# Patient Record
Sex: FEMALE | Race: ASIAN | Employment: FULL TIME | URBAN - METROPOLITAN AREA
[De-identification: names, ages, dates, MRNs, and addresses within clinical notes are randomized per-mention and may not be internally consistent; named-entity substitution may affect disease eponyms.]

---

## 2017-01-10 ENCOUNTER — MEDICAL CORRESPONDENCE (OUTPATIENT)
Dept: HEALTH INFORMATION MANAGEMENT | Facility: CLINIC | Age: 27
End: 2017-01-10

## 2017-06-07 ENCOUNTER — TRANSFERRED RECORDS (OUTPATIENT)
Dept: HEALTH INFORMATION MANAGEMENT | Facility: CLINIC | Age: 27
End: 2017-06-07

## 2017-06-07 ENCOUNTER — MEDICAL CORRESPONDENCE (OUTPATIENT)
Dept: HEALTH INFORMATION MANAGEMENT | Facility: CLINIC | Age: 27
End: 2017-06-07

## 2017-06-12 ENCOUNTER — TRANSFERRED RECORDS (OUTPATIENT)
Dept: HEALTH INFORMATION MANAGEMENT | Facility: CLINIC | Age: 27
End: 2017-06-12

## 2017-06-22 ENCOUNTER — MEDICAL CORRESPONDENCE (OUTPATIENT)
Dept: HEALTH INFORMATION MANAGEMENT | Facility: CLINIC | Age: 27
End: 2017-06-22

## 2017-06-29 ENCOUNTER — OFFICE VISIT (OUTPATIENT)
Dept: OBGYN | Facility: CLINIC | Age: 27
End: 2017-06-29
Attending: OBSTETRICS & GYNECOLOGY
Payer: COMMERCIAL

## 2017-06-29 VITALS
HEART RATE: 71 BPM | SYSTOLIC BLOOD PRESSURE: 100 MMHG | DIASTOLIC BLOOD PRESSURE: 70 MMHG | HEIGHT: 64 IN | BODY MASS INDEX: 19.48 KG/M2 | WEIGHT: 114.1 LBS

## 2017-06-29 DIAGNOSIS — E03.9 HYPOTHYROIDISM, UNSPECIFIED TYPE: ICD-10-CM

## 2017-06-29 DIAGNOSIS — Z34.01 ENCOUNTER FOR SUPERVISION OF NORMAL FIRST PREGNANCY IN FIRST TRIMESTER: Primary | ICD-10-CM

## 2017-06-29 DIAGNOSIS — Z34.01 NORMAL FIRST PREGNANCY IN FIRST TRIMESTER: Primary | ICD-10-CM

## 2017-06-29 LAB
ABO + RH BLD: NORMAL
ABO + RH BLD: NORMAL
BASOPHILS # BLD AUTO: 0 10E9/L (ref 0–0.2)
BASOPHILS NFR BLD AUTO: 0.1 %
BLD GP AB SCN SERPL QL: NORMAL
BLOOD BANK CMNT PATIENT-IMP: NORMAL
DIFFERENTIAL METHOD BLD: NORMAL
EOSINOPHIL # BLD AUTO: 0.1 10E9/L (ref 0–0.7)
EOSINOPHIL NFR BLD AUTO: 0.8 %
ERYTHROCYTE [DISTWIDTH] IN BLOOD BY AUTOMATED COUNT: 14.2 % (ref 10–15)
HCT VFR BLD AUTO: 38.8 % (ref 35–47)
HGB BLD-MCNC: 13.3 G/DL (ref 11.7–15.7)
IMM GRANULOCYTES # BLD: 0 10E9/L (ref 0–0.4)
IMM GRANULOCYTES NFR BLD: 0.1 %
LYMPHOCYTES # BLD AUTO: 1.5 10E9/L (ref 0.8–5.3)
LYMPHOCYTES NFR BLD AUTO: 21.6 %
MCH RBC QN AUTO: 29.9 PG (ref 26.5–33)
MCHC RBC AUTO-ENTMCNC: 34.3 G/DL (ref 31.5–36.5)
MCV RBC AUTO: 87 FL (ref 78–100)
MONOCYTES # BLD AUTO: 0.6 10E9/L (ref 0–1.3)
MONOCYTES NFR BLD AUTO: 7.9 %
NEUTROPHILS # BLD AUTO: 5 10E9/L (ref 1.6–8.3)
NEUTROPHILS NFR BLD AUTO: 69.5 %
NRBC # BLD AUTO: 0 10*3/UL
NRBC BLD AUTO-RTO: 0 /100
PLATELET # BLD AUTO: 161 10E9/L (ref 150–450)
RBC # BLD AUTO: 4.45 10E12/L (ref 3.8–5.2)
SPECIMEN EXP DATE BLD: NORMAL
WBC # BLD AUTO: 7.1 10E9/L (ref 4–11)

## 2017-06-29 PROCEDURE — 87340 HEPATITIS B SURFACE AG IA: CPT | Performed by: ADVANCED PRACTICE MIDWIFE

## 2017-06-29 PROCEDURE — 87088 URINE BACTERIA CULTURE: CPT | Performed by: ADVANCED PRACTICE MIDWIFE

## 2017-06-29 PROCEDURE — 86850 RBC ANTIBODY SCREEN: CPT | Performed by: ADVANCED PRACTICE MIDWIFE

## 2017-06-29 PROCEDURE — 82306 VITAMIN D 25 HYDROXY: CPT | Performed by: ADVANCED PRACTICE MIDWIFE

## 2017-06-29 PROCEDURE — 87389 HIV-1 AG W/HIV-1&-2 AB AG IA: CPT | Performed by: ADVANCED PRACTICE MIDWIFE

## 2017-06-29 PROCEDURE — 86901 BLOOD TYPING SEROLOGIC RH(D): CPT | Performed by: ADVANCED PRACTICE MIDWIFE

## 2017-06-29 PROCEDURE — 86780 TREPONEMA PALLIDUM: CPT | Performed by: ADVANCED PRACTICE MIDWIFE

## 2017-06-29 PROCEDURE — 86762 RUBELLA ANTIBODY: CPT | Performed by: ADVANCED PRACTICE MIDWIFE

## 2017-06-29 PROCEDURE — 99212 OFFICE O/P EST SF 10 MIN: CPT | Mod: ZF

## 2017-06-29 PROCEDURE — 85025 COMPLETE CBC W/AUTO DIFF WBC: CPT | Performed by: ADVANCED PRACTICE MIDWIFE

## 2017-06-29 PROCEDURE — 86900 BLOOD TYPING SEROLOGIC ABO: CPT | Performed by: ADVANCED PRACTICE MIDWIFE

## 2017-06-29 PROCEDURE — 76817 TRANSVAGINAL US OBSTETRIC: CPT | Mod: ZF

## 2017-06-29 PROCEDURE — 87086 URINE CULTURE/COLONY COUNT: CPT | Performed by: ADVANCED PRACTICE MIDWIFE

## 2017-06-29 PROCEDURE — 36415 COLL VENOUS BLD VENIPUNCTURE: CPT | Performed by: ADVANCED PRACTICE MIDWIFE

## 2017-06-29 RX ORDER — PRENATAL VIT/IRON FUM/FOLIC AC 27MG-0.8MG
1 TABLET ORAL DAILY
COMMUNITY
End: 2018-09-14

## 2017-06-29 RX ORDER — LEVOTHYROXINE SODIUM 50 UG/1
50 TABLET ORAL DAILY
COMMUNITY
End: 2017-10-04

## 2017-06-29 NOTE — LETTER
"2017       RE: Evelia Jansen  [address]  [address]  [facility] [address]     Dear Colleague,    Thank you for referring your patient, Evelia Jansen, to the [facility] at [facility]. Please see a copy of my visit note below.      Subjective   26 year old female who presents to clinic for initiation of OB care.   at 9w3d with estimated date of delivery of 2018 based on LMP, c/w ultrasound.    Pregnancy is planned but happened \"very fast!\" States they were using condoms then stopped and only had to try for 1 month before becoming pregnant.    Feeling well overall.  Notes very small amount of spotting yesterday. Has not noticed any more.  Reports mild nausea and decreased appetite- Had some vomiting this week- sometimes in evening. Has been using aashish with relief.    States she was recently diagnosed with hypothyroidism and was started on Levothyroxine 50mcg. Sarted taking . Discussed thyroid labs at next appt so is at least 4 weeks s/p starting medication.     x 1 year in July,  \"Paige Martinez.\"   Has been in [place] for 1 year- moved from [place].   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 On year .      States that she understands English well and if her  is present, she doesn't need . Will request an  if desired or if at appt alone.      PERSONAL/SOCIAL HISTORY  Employment:not working  Additional items: Denies past or present domestic violence, sexual and psychological abuse.    Objective  -VS: reviewed and within normal limits   -General appearance: no acute distress, patient is comfortable   NEUROLOGICAL/PSYCHIATRIC   - Oriented x3,   -Mood and affect: : normal   Genetic/Infection questionnaire completed, risks include none    Has not had a pap ever that she remembers.    Assessment/Plan   at 9w3d  by Patient's last menstrual period was 2017 (approximate). and US " confirms  Encounter Diagnoses   Name Primary?     Normal first pregnancy in first trimester Yes     Hypothyroidism, unspecified type      Orders Placed This Encounter   Procedures     25- OH-Vitamin D     CBC with Platelets Differential [DXD343]     Anti Treponema [DVJ0680]     HIV Antigen Antibody Combo [IRY8276]     Hepatitis B Surface Antigen [INQ167]     Rubella Antibody IgG Quantitative [JZE9693]     ABO/Rh Type and Screen [KUC620]     Orders Placed This Encounter   Medications     levothyroxine (SYNTHROID/LEVOTHROID) 50 MCG tablet     Sig: Take 50 mcg by mouth daily     Prenatal Vit-Fe Fumarate-FA (PRENATAL MULTIVITAMIN  PLUS IRON) 27-0.8 MG TABS per tablet     Sig: Take 1 tablet by mouth daily     - Oriented to Practice, types of care, and how to reach a provider.   - Patient received 1st trimester new OB education packet complete with aide of The Expectant Family booklet including information on genetic screening test options.    - Patient Undecided on 1st trimester screening, will need MFM referral for level 2 u/s or both.  - Patient was encouraged to continue prenatal vitamins as tolerated.    - Patient instructed to schedule new OB exam with provider in 1-2 weeks.    - Pregnancy concerns to be addressed by provider at new OB exam include: Needs thyroid labs, pap, gc/ct at NOB, Undecided about 1st trimester, needs mfm orders for level 2, Need to discuss CNM or MD care    - OBI education reviewed.  - OBI labs ordered.   - Needs thyroid labs at NOB (so will be at least 4 weeks for starting meds).  - Needs pap and gc/ct at NO[facility].  - Undecided on 1st trimester screening, will need MFM referral for level 2 u/s or both.  - Need to discuss CNM or MD care  - Continue scheduled prenatal care, RT weeks and prn if questions or concerns    KELSEY Mahajan, PADMINI

## 2017-06-29 NOTE — PATIENT INSTRUCTIONS
"  Thank you for choosing [facility] for your obstetrical care.  We are an integrated health clinic with obstetricians, midwives, a psychologist, an acupuncturist, a nutritionist, a pharmacist, internal medicine and family practice all in one.  If you have questions about services offered please ask.      o Please keep all appointments with your provider.  You will help catch, prevent and treat problems early.  o Review your Expectant Family booklet and folder given to you at this intake visit.  It can answer many basic questions including:    Treatment for nausea and vomiting    Medications that are safe in pregnancy    Healthy diet and weight gain    Exercise and activity  o ASK questions!!  Please use \"Questions for my New OB visit\" form to write down any questions or concerns for your next visit.  o Eat a healthy diet.  Visit www.choosemyplate.gov and click on  Pregnancy and Breastfeeding  for information and tips  o Do not smoke.  Avoid other people's smoke, too.  We are happy to help with referrals to stop smoking programs.  o Do not drink alcohol.  o Try to avoid people who have colds or other infections.  Practice good hand washing.  o Consider registering for our Healthy Pregnancy Class here at [facility].  This class is offered every 3rd Wednesday from 2:30-4:30 p.m.  [place] at [phone] or online at [email or web address] or [email or web address]  o Consider registering for prenatal education classes through [place] at [facility].  You can view class schedules and register online at [email or web address] or call [phone] for questions     For urgent concerns, call [facility] at [phone] to speak with a triage nurse during regular clinic hours (8:00 am - 4:30 pm).  This line is answered by our service 24 hours a day, after hours a provider will return your call.  "

## 2017-06-29 NOTE — NURSING NOTE
Chief Complaint   Patient presents with     Prenatal Care     OB intake visit. JOHNATHAN 1/29/18 9 weeks and 3 days       Donna Romeo, CMA 6/29/2017

## 2017-06-29 NOTE — PROGRESS NOTES
"  Subjective   26 year old female who presents to clinic for initiation of OB care.   at 9w3d with estimated date of delivery of 2018 based on LMP, c/w ultrasound.    Pregnancy is planned but happened \"very fast!\" States they were using condoms then stopped and only had to try for 1 month before becoming pregnant.    Feeling well overall.  Notes very small amount of spotting yesterday. Has not noticed any more.  Reports mild nausea and decreased appetite- Had some vomiting this week- sometimes in evening. Has been using aashish with relief.    States she was recently diagnosed with hypothyroidism and was started on Levothyroxine 50mcg. Sarted taking . Discussed thyroid labs at next appt so is at least 4 weeks s/p starting medication.     x 1 year in July,  \"Paige Martinez.\"   Has been in [place] for 1 year- moved from [place].   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 On year .      States that she understands English well and if her  is present, she doesn't need . Will request an  if desired or if at appt alone.      PERSONAL/SOCIAL HISTORY  Employment:not working  Additional items: Denies past or present domestic violence, sexual and psychological abuse.    Objective  -VS: reviewed and within normal limits   -General appearance: no acute distress, patient is comfortable   NEUROLOGICAL/PSYCHIATRIC   - Oriented x3,   -Mood and affect: : normal   Genetic/Infection questionnaire completed, risks include none    Has not had a pap ever that she remembers.    Assessment/Plan   at 9w3d  by Patient's last menstrual period was 2017 (approximate). and US confirms  Encounter Diagnoses   Name Primary?     Normal first pregnancy in first trimester Yes     Hypothyroidism, unspecified type      Orders Placed This Encounter   Procedures     25- OH-Vitamin D     CBC with Platelets Differential [AFR578]     Anti Treponema [GSG9261]     HIV " Antigen Antibody Combo [IVI0730]     Hepatitis B Surface Antigen [OYC476]     Rubella Antibody IgG Quantitative [RDZ9489]     ABO/Rh Type and Screen [TWO178]     Orders Placed This Encounter   Medications     levothyroxine (SYNTHROID/LEVOTHROID) 50 MCG tablet     Sig: Take 50 mcg by mouth daily     Prenatal Vit-Fe Fumarate-FA (PRENATAL MULTIVITAMIN  PLUS IRON) 27-0.8 MG TABS per tablet     Sig: Take 1 tablet by mouth daily     - Oriented to Practice, types of care, and how to reach a provider.   - Patient received 1st trimester new OB education packet complete with aide of The Expectant Family booklet including information on genetic screening test options.    - Patient Undecided on 1st trimester screening, will need MFM referral for level 2 u/s or both.  - Patient was encouraged to continue prenatal vitamins as tolerated.    - Patient instructed to schedule new OB exam with provider in 1-2 weeks.    - Pregnancy concerns to be addressed by provider at new OB exam include: Needs thyroid labs, pap, gc/ct at NO[facility], Undecided about 1st trimester, needs mfm orders for level 2, Need to discuss CNM or MD care    - OBI education reviewed.  - OBI labs ordered.   - Needs thyroid labs at NOB (so will be at least 4 weeks for starting meds).  - Needs pap and gc/ct at NOB.  - Undecided on 1st trimester screening, will need MFM referral for level 2 u/s or both.  - Need to discuss CNM or MD care  - Continue scheduled prenatal care, RT weeks and prn if questions or concerns    KELSEY Mahajan, PADMINI

## 2017-06-29 NOTE — PROGRESS NOTES
26 year old female,  with LMP 17,  9 3/7weeks Estimated Date of Delivery: 18 presents for confirmation of dates and assessment of viability. This study was done transvaginally.    Measurements     CRL = 29.6 mm = 9 6/7 weeks  EGA.   JOHNATHAN = 18.     Fetal anatomy appears normal for gestational age.     Fetal/Fetal Cardiac Activity: Present.  FHR = 176bpm.     Implantation: Intrauterine.     Cervix = 3.7 cm      Maternal structures appear normal.    Impression: viable IUP at 9+3 weeks by LMP c/w today's study.  Use LMP dating if sure and cycles are regular.    Recommend comprehensive scan at 18 to 20 weeks.    DEREJE Cortes MD, FACOG

## 2017-06-29 NOTE — LETTER
2017       RE: Evelia Jansen  [address]  [address]  [facility] [address]     Dear Colleague,    Thank you for referring your patient, Evelia Jansen, to the [facility] at [facility]. Please see a copy of my visit note below.    26 year old female,  with LMP 17,  9 3/7weeks Estimated Date of Delivery: 18 presents for confirmation of dates and assessment of viability. This study was done transvaginally.    Measurements     CRL = 29.6 mm = 9 6/7 weeks  EGA.   JOHNATHAN = 18.     Fetal anatomy appears normal for gestational age.     Fetal/Fetal Cardiac Activity: Present.  FHR = 176bpm.     Implantation: Intrauterine.     Cervix = 3.7 cm      Maternal structures appear normal.    Impression: viable IUP at 9+3 weeks by LMP c/w today's study.  Use LMP dating if sure and cycles are regular.    Recommend comprehensive scan at 18 to 20 weeks.    DEREJE Cortes MD, FACOG

## 2017-06-29 NOTE — MR AVS SNAPSHOT
"              After Visit Summary   6/29/2017    Evelia Jansen    MRN: [id number]           Patient Information     Date Of Birth          1990        Visit Information        Provider Department      6/29/2017 3:30 PM Gabe Medina CNM [facility]        Today's Diagnoses     Normal first pregnancy in first trimester    -  1    Hypothyroidism, unspecified type          Care Instructions      Thank you for choosing [facility] ([facility]) for your obstetrical care.  We are an integrated health clinic with obstetricians, midwives, a psychologist, an acupuncturist, a nutritionist, a pharmacist, internal medicine and family practice all in one.  If you have questions about services offered please ask.      o Please keep all appointments with your provider.  You will help catch, prevent and treat problems early.  o Review your Expectant Family booklet and folder given to you at this intake visit.  It can answer many basic questions including:    Treatment for nausea and vomiting    Medications that are safe in pregnancy    Healthy diet and weight gain    Exercise and activity  o ASK questions!!  Please use \"Questions for my New OB visit\" form to write down any questions or concerns for your next visit.  o Eat a healthy diet.  Visit www.choosemyplate.gov and click on  Pregnancy and Breastfeeding  for information and tips  o Do not smoke.  Avoid other people's smoke, too.  We are happy to help with referrals to stop smoking programs.  o Do not drink alcohol.  o Try to avoid people who have colds or other infections.  Practice good hand washing.  o Consider registering for our Healthy Pregnancy Class here at [facility].  This class is offered every 3rd Wednesday from 2:30-4:30 p.m.  [place] at [phone] or online at [email or web address] or [email or web address]  o Consider registering for prenatal education classes through [place] at [facility] " [place].  You can view class schedules and register online at [email or web address] or call [phone] for questions     For urgent concerns, call [facility] at [phone] to speak with a triage nurse during regular clinic hours (8:00 am - 4:30 pm).  This line is answered by our service 24 hours a day, after hours a provider will return your call.          Follow-ups after your visit        Follow-up notes from your care team     Return in 2 weeks (on 2017) for NOB.      Who to contact     Please call your clinic at [phone] to:    Ask questions about your health    Make or cancel appointments    Discuss your medicines    Learn about your test results    Speak to your doctor   If you have compliments or concerns about an experience at your clinic, or if you wish to file a complaint, please contact [facility] Patient Relations at [phone] or email us at [email or web address]         Additional Information About Your Visit        "[employer]harYFind Technologies Information     Zaplee is an electronic gateway that provides easy, online access to your medical records. With Zaplee, you can request a clinic appointment, read your test results, renew a prescription or communicate with your care team.     To sign up for Zaplee visit the website at [email or web address]   You will be asked to enter the access code listed below, as well as some personal information. Please follow the directions to create your username and password.     Your access code is: -[email or web address]  Expires: 2017  6:30 AM     Your access code will  in 90 days. If you need help or a new code, please contact your [facility] or call [phone] for assistance.        Care EveryWhere ID     This is your Care EveryWhere ID. This could be used by other organizations to access your [facility] medical records  [id number]        Your Vitals Were     Pulse Height Last Period  "BMI (Body Mass Index)          71 1.615 m (5' 3.58\") 04/24/2017 (Approximate) 19.84 kg/m2         Blood Pressure from Last 3 Encounters:   06/29/17 100/70    Weight from Last 3 Encounters:   06/29/17 51.8 kg (114 lb 1.6 oz)              We Performed the Following     25- OH-Vitamin D     ABO/Rh Type and Screen [YXE130]     Anti Treponema [CLO1813]     CBC with Platelets Differential [ANJ691]     Hepatitis B Surface Antigen [JBJ637]     HIV Antigen Antibody Combo [JGM5741]     Rubella Antibody IgG Quantitative [UUC7530]     Urine Culture Aerobic Bacterial [OZH107]        Primary Care Provider    None Specified       No primary provider on file.        Equal Access to Services     MICHELLE MARTÍNEZ : Sylvia Salamanca, titus vicente, nelly salas, alistair suazo . So [facility] [phone].    ATENCIÓN: Si habla español, tiene a tillman disposición servicios gratuitos de asistencia lingüística. Llame al [phone].    We comply with applicable federal civil rights laws and [place] laws. We do not discriminate on the basis of race, color, national origin, age, disability sex, sexual orientation or gender identity.            Thank you!     Thank you for choosing [facility]  for your care. Our goal is always to provide you with excellent care. Hearing back from our patients is one way we can continue to improve our services. Please take a few minutes to complete the written survey that you may receive in the mail after your visit with us. Thank you!             Your Updated Medication List - Protect others around you: Learn how to safely use, store and throw away your medicines at www.disposemymeds.org.          This list is accurate as of: 6/29/17  4:37 PM.  Always use your most recent med list.                   Brand Name Dispense Instructions for use Diagnosis    levothyroxine 50 MCG tablet    SYNTHROID/LEVOTHROID     Take 50 mcg by mouth daily        " prenatal multivitamin  plus iron 27-0.8 MG Tabs per tablet      Take 1 tablet by mouth daily

## 2017-06-29 NOTE — MR AVS SNAPSHOT
After Visit Summary   2017    Evelia Jansen    MRN: [id number]           Patient Information     Date Of Birth          1990        Visit Information        Provider Department      2017 3:00 PM [facility] ULTRASOUND II [facility]        Today's Diagnoses     Encounter for supervision of normal first pregnancy in first trimester    -  1       Follow-ups after your visit        Who to contact     Please call your clinic at [phone] to:    Ask questions about your health    Make or cancel appointments    Discuss your medicines    Learn about your test results    Speak to your doctor   If you have compliments or concerns about an experience at your clinic, or if you wish to file a complaint, please contact [facility] Patient Relations at [phone] or email us at [email or web address]         Additional Information About Your Visit        MyChart Information     Attender is an electronic gateway that provides easy, online access to your medical records. With Attender, you can request a clinic appointment, read your test results, renew a prescription or communicate with your care team.     To sign up for PitchEnginet visit the website at [email or web address]   You will be asked to enter the access code listed below, as well as some personal information. Please follow the directions to create your username and password.     Your access code is: -[email or web address]  Expires: 2017  6:30 AM     Your access code will  in 90 days. If you need help or a new code, please contact your [facility] Clinic or call [phone] for assistance.        Care EveryWhere ID     This is your Care EveryWhere ID. This could be used by other organizations to access your [facility] medical records  [id number]        Your Vitals Were     Last Period                   2017 (Approximate)            Blood Pressure from Last 3  Encounters:   06/29/17 100/70    Weight from Last 3 Encounters:   06/29/17 51.8 kg (114 lb 1.6 oz)               Primary Care Provider    None Specified       No primary provider on file.        Equal Access to Services     MICHELLE MARTÍNEZ : Hadii aad ku hadalbert Salamanca, watrangda lucecilia, sanazta karodrigoda maritza, alistair carvajal ivangume ramirez laRenatoalbert winter. So [facility] [phone].    ATENCIÓN: Si habla español, tiene a tillman disposición servicios gratuitos de asistencia lingüística. Llame al [phone].    We comply with applicable federal civil rights laws and [place] laws. We do not discriminate on the basis of race, color, national origin, age, disability sex, sexual orientation or gender identity.            Thank you!     Thank you for choosing [facility]  for your care. Our goal is always to provide you with excellent care. Hearing back from our patients is one way we can continue to improve our services. Please take a few minutes to complete the written survey that you may receive in the mail after your visit with us. Thank you!             Your Updated Medication List - Protect others around you: Learn how to safely use, store and throw away your medicines at www.disposemymeds.org.      Notice  As of 6/29/2017  3:56 PM    You have not been prescribed any medications.

## 2017-06-30 LAB
DEPRECATED CALCIDIOL+CALCIFEROL SERPL-MC: 32 UG/L (ref 20–75)
HBV SURFACE AG SERPL QL IA: NONREACTIVE
HIV 1+2 AB+HIV1 P24 AG SERPL QL IA: NORMAL
RUBV IGG SERPL IA-ACNC: 116 IU/ML
T PALLIDUM IGG+IGM SER QL: NEGATIVE

## 2017-07-01 LAB
BACTERIA SPEC CULT: ABNORMAL
Lab: ABNORMAL
MICRO REPORT STATUS: ABNORMAL
SPECIMEN SOURCE: ABNORMAL

## 2017-07-03 PROBLEM — R82.71 GBS BACTERIURIA: Status: ACTIVE | Noted: 2017-07-03

## 2017-07-07 ASSESSMENT — ENCOUNTER SYMPTOMS
POLYDIPSIA: 0
DECREASED APPETITE: 1
WEIGHT GAIN: 0
HALLUCINATIONS: 0
FEVER: 0
WEIGHT LOSS: 0
ALTERED TEMPERATURE REGULATION: 0
CHILLS: 0
NIGHT SWEATS: 0
POLYPHAGIA: 0
FATIGUE: 0
INCREASED ENERGY: 0

## 2017-07-07 ASSESSMENT — ANXIETY QUESTIONNAIRES
7. FEELING AFRAID AS IF SOMETHING AWFUL MIGHT HAPPEN: NOT AT ALL
7. FEELING AFRAID AS IF SOMETHING AWFUL MIGHT HAPPEN: NOT AT ALL
3. WORRYING TOO MUCH ABOUT DIFFERENT THINGS: NOT AT ALL
1. FEELING NERVOUS, ANXIOUS, OR ON EDGE: SEVERAL DAYS
5. BEING SO RESTLESS THAT IT IS HARD TO SIT STILL: NOT AT ALL
6. BECOMING EASILY ANNOYED OR IRRITABLE: NOT AT ALL
GAD7 TOTAL SCORE: 1
4. TROUBLE RELAXING: NOT AT ALL
GAD7 TOTAL SCORE: 1
2. NOT BEING ABLE TO STOP OR CONTROL WORRYING: NOT AT ALL
GAD7 TOTAL SCORE: 1

## 2017-07-08 ASSESSMENT — ANXIETY QUESTIONNAIRES: GAD7 TOTAL SCORE: 1

## 2017-07-12 ENCOUNTER — OFFICE VISIT (OUTPATIENT)
Dept: OBGYN | Facility: CLINIC | Age: 27
End: 2017-07-12
Attending: ADVANCED PRACTICE MIDWIFE
Payer: COMMERCIAL

## 2017-07-12 VITALS
WEIGHT: 114.4 LBS | HEART RATE: 82 BPM | BODY MASS INDEX: 19.9 KG/M2 | DIASTOLIC BLOOD PRESSURE: 64 MMHG | SYSTOLIC BLOOD PRESSURE: 102 MMHG

## 2017-07-12 DIAGNOSIS — E03.9 HYPOTHYROIDISM, UNSPECIFIED TYPE: ICD-10-CM

## 2017-07-12 DIAGNOSIS — Z34.01 NORMAL FIRST PREGNANCY IN FIRST TRIMESTER: Primary | ICD-10-CM

## 2017-07-12 DIAGNOSIS — R82.71 GBS BACTERIURIA: ICD-10-CM

## 2017-07-12 LAB — TSH SERPL DL<=0.005 MIU/L-ACNC: 1.97 MU/L (ref 0.4–4)

## 2017-07-12 PROCEDURE — 87591 N.GONORRHOEAE DNA AMP PROB: CPT | Performed by: ADVANCED PRACTICE MIDWIFE

## 2017-07-12 PROCEDURE — G0145 SCR C/V CYTO,THINLAYER,RESCR: HCPCS | Performed by: ADVANCED PRACTICE MIDWIFE

## 2017-07-12 PROCEDURE — 36415 COLL VENOUS BLD VENIPUNCTURE: CPT | Performed by: ADVANCED PRACTICE MIDWIFE

## 2017-07-12 PROCEDURE — 84443 ASSAY THYROID STIM HORMONE: CPT | Performed by: ADVANCED PRACTICE MIDWIFE

## 2017-07-12 PROCEDURE — 87491 CHLMYD TRACH DNA AMP PROBE: CPT | Performed by: ADVANCED PRACTICE MIDWIFE

## 2017-07-12 PROCEDURE — 99212 OFFICE O/P EST SF 10 MIN: CPT | Mod: 25,ZF

## 2017-07-12 NOTE — LETTER
2017       RE: Evelia Jansen  [address]  [address]  [facility] [address]     Dear Colleague,    Thank you for referring your patient, Evelia Jansen, to the [facility] at [facility]. Please see a copy of my visit note below.      SUBJECTIVE:    26 year old, female, , 11w2d,  who presents to the clinic today for a new ob visit. Feels well overall. Has started PNV. Discussed that pt's family is back in [place] but her mother is applying for visa to come to be with Altamirano in labor.   Estimated Date of Delivery: 2018     OTHER CONCERNS: Decreased appetite, mild nausea occasionally. Still using aashish, which helps. Otherwise feeling well.      ===========================================  ROS   10-point ROS negative save for items described above.   PSYCHIATRIC:  Denies mood changes, difficulty concentrating, depression, anxiety, panic attacks or post partum depression    Social History   Substance Use Topics     Smoking status: Never Smoker     Smokeless tobacco: Never Used     Alcohol use No     History   Drug Use No     History   Smoking Status     Never Smoker   Smokeless Tobacco     Never Used       Alcohol use No     No family history on file.  ============================================  MEDICAL HISTORY   No Known Allergies      Current Outpatient Prescriptions:      levothyroxine (SYNTHROID/LEVOTHROID) 50 MCG tablet, Take 50 mcg by mouth daily, Disp: , Rfl:      Prenatal Vit-Fe Fumarate-FA (PRENATAL MULTIVITAMIN  PLUS IRON) 27-0.8 MG TABS per tablet, Take 1 tablet by mouth daily, Disp: , Rfl:     Past Medical History:   Diagnosis Date     Hypothyroidism 2017    50mcg levothyroxine       No past surgical history on file.         Obstetric History       T0      L0     SAB0   TAB0   Ectopic0   Multiple0   Live Births0       # Outcome Date GA Lbr Nicho/2nd Weight Sex Delivery Anes PTL Lv   1 Current                   GYN History- No Abnormal  Pap Smears                        Cervical procedures: None                        History of STI: None    I personally reviewed the past social/family/medical and surgical history on the date of service.   I reviewed lab work done at Intake visit with patient.    OBJECTIVE:   PHYSICAL EXAM:  /64 (BP Location: Left arm, Patient Position: Chair)  Pulse 82  Wt 51.9 kg (114 lb 6.4 oz)  LMP 04/24/2017 (Approximate)  Breastfeeding? No  BMI 19.9 kg/m2  BMI- Body mass index is 19.9 kg/(m^2)., GENERAL:  Pleasant pregnant female, alert, cooperative and well groomed.  SKIN:  Warm and dry, without lesions or rashes  HEAD: Symmetrical features.  MOUTH:  Buccal mucosa pink, moist without lesions.  Teeth in good repair.    NECK:  Thyroid without enlargement and nodules.  Lymph nodes not palpable.  LUNGS:  Clear to auscultation.  BREAST:    No dominant, fixed or suspicious masses are noted.  No skin or nipple changes or axillary nodes.   Nipples everted.    HEART:  RRR without murmur.  ABDOMEN: Soft without masses , tenderness or organomegaly.  No CVA tenderness.  Uterus palpable at size equal to dates.  No scars noted.. Fetal heart tones present.  MUSCULOSKELETAL:  Full range of motion  EXTREMITIES:  No edema. No significant varicosities.   PELVIC EXAM:  GENITALIA: EGBUS  External genitalia, Bartholin's glands, urethra & Fithian's glands:normal. Vulva reveals no erythema or lesions.        VAGINA:  pink, normal rugae and discharge, no lesions, good tone.   CERVIX:  smooth, without discharge or CMT.                UTERUS: Anteverted,  nontender 12 week size.   ADNEXA:  Without masses or tenderness.   RECTAL:  Normal appearance.  Digital exam deferred.  WET PREP:Not done  GC/CHLAMYDIA CULTURE OBTAINED:YES on pap    ASSESSMENT:  Intrauterine pregnancy 11w2d size consistent with dates  Genetic Screening: First Trimester Screen    Encounter Diagnoses   Name Primary?     Normal first pregnancy in first trimester Yes      Hypothyroidism, unspecified type         PLAN:  Orders Placed This Encounter   Procedures     Obtaining, preparing and conveyance of cervical or vaginal smear to laboratory.     TSH with free T4 reflex     Pap imaged thin layer screen reflex to HPV if ASCUS - recommended age 25 - 29 years     Maternal Fetal Medicine Center Referral [9046.022]     - Reviewed use of triage nurse line and contacting the on-call provider after hours for an urgent need such as fever, vagina bleeding, bladder or vaginal infection, rupture of membranes,  or term labor.    - Reviewed best evidence for: weight gain for her weight and height for pregnancy:  RECOMMENDED WEIGHT GAIN: 25-35 lbs.   healthy diet and foods to avoid; encouraged 5 protein snacks per day; exercise and activity during pregnancy;avoiding exposure to toxoplasmosis; and maintenance of a generally healthy lifestyle.   - Discussed the harms, benefits, side effects and alternative therapies for current prescribed and OTC medications.  - All pt's and FOB's  questions discussed and answered.  Pt verbalized understanding of and agreement to plan of care.   - Reviewed CNM versus MD care - patient would like to decide after first trimester screening results.   - Reviewed implications of GBS bacteruria. Pt agrees to IV antibiotics in labor.   - Reviewed r/b/a to first trimester screen and level II ultrasound - pt desires both. Advised to go to [facility] after visit to schedule first trimester screen.   - Continue scheduled prenatal care and prn if questions or concerns. RTC 6 weeks.     KELSEY Li CNM               Answers for HPI/ROS submitted by the patient on 2017   ALEX 7 TOTAL SCORE: 1  PHQ-2 Score: 0      Again, thank you for allowing me to participate in the care of your patient.      Sincerely,    KELSEY Li CNM

## 2017-07-12 NOTE — MR AVS SNAPSHOT
"              After Visit Summary   7/12/2017    Evelia Jansen    MRN: [id number]           Patient Information     Date Of Birth          1990        Visit Information        Provider Department      7/12/2017 11:15 AM Davina Zhao APRN CNM [facility]        Today's Diagnoses     Normal first pregnancy in first trimester    -  1    Hypothyroidism, unspecified type        GBS bacteriuria          Care Instructions      Thank you for choosing [facility] ([facility]) for your obstetrical care.  We are an integrated health clinic with obstetricians, midwives, a psychologist, an acupuncturist, a nutritionist, a pharmacist, internal medicine and family practice all in one.  If you have questions about services offered please ask.      o Please keep all appointments with your provider.  You will help catch, prevent and treat problems early.  o Review your Expectant Family booklet and folder given to you at this intake visit.  It can answer many basic questions including:    Treatment for nausea and vomiting    Medications that are safe in pregnancy    Healthy diet and weight gain    Exercise and activity  o ASK questions!!  Please use \"Questions for my New OB visit\" form to write down any questions or concerns for your next visit.  o Eat a healthy diet.  Visit www.choosemyplate.gov and click on  Pregnancy and Breastfeeding  for information and tips  o Do not smoke.  Avoid other people's smoke, too.  We are happy to help with referrals to stop smoking programs.  o Do not drink alcohol.  o Try to avoid people who have colds or other infections.  Practice good hand washing.  o Consider registering for our Healthy Pregnancy Class here at [facility].  This class is offered every 3rd Wednesday from 2:30-4:30 p.m.  [place] at [phone] or online at [email or web address] or [email or web address]  o Consider registering for prenatal education classes through [employer] [facility] [place] " "[facility].  You can view class schedules and register online at [email or web address] or call [phone] for questions     For urgent concerns, call [facility] at [phone] to speak with a triage nurse during regular clinic hours (8:00 am - 4:30 pm).  This line is answered by our service 24 hours a day, after hours a provider will return your call.  Thank you for choosing [facility] ([facility] for your obstetrical care.  We are an integrated health clinic with obstetricians, midwives, a psychologist, an acupuncturist, a nutritionist, a pharmacist, internal medicine and family practice all in one.  If you have questions about services offered please ask.      o Please keep all appointments with your provider.  You will help catch, prevent and treat problems early.  o Review your Expectant Family booklet and folder given to you at this intake visit.  It can answer many basic questions including:    Treatment for nausea and vomiting    Medications that are safe in pregnancy    Healthy diet and weight gain    Exercise and activity  o ASK questions!!  Please use \"Questions for my New OB visit\" form to write down any questions or concerns for your next visit.  o Eat a healthy diet.  Visit www.choosemyplate.gov and click on  Pregnancy and Breastfeeding  for information and tips  o Do not smoke.  Avoid other people's smoke, too.  We are happy to help with referrals to stop smoking programs.  o Do not drink alcohol.  o Try to avoid people who have colds or other infections.  Practice good hand washing.  o Consider registering for our Healthy Pregnancy Class here at [facility].  This class is offered every 3rd Wednesday from 2:30-4:30 p.m.  [place] at [phone] or online at [email or web address] or [email or web address]  o Consider registering for prenatal education classes through [place] at [facility].  You can view class schedules and register online at " [email or web address] or call [phone] for questions     For urgent concerns, call [facility] at [phone] to speak with a triage nurse during regular clinic hours (8:00 am - 4:30 pm).  This line is answered by our service 24 hours a day, after hours a provider will return your call.          Follow-ups after your visit        Additional Services     Maternal Fetal Medicine Center Referral [9046.022]       >> Patient may proceed with recommendations for further testing as directed by the Maternal Fetal Medicine Specialist >>    >> If requesting Fetal Echo: MFM will determine appropriate location for exam due to indication.    >> If requesting Lung Maturity Amnio:  If results indicate fetal lung maturity, induction or C/S is recommended within 36 hours.  Please schedule accordingly.     Dear Patient:   Please be aware that coverage of these services is subject to the terms and limitations of your health insurance plan.  Call member services at your health plan with any benefit or coverage questions.      Please bring the following to your appointment:    >>  Any x-rays, CTs or MRIs which have been performed.  Contact the facility where they were done to arrange for  prior to your scheduled appointment.  Any new CT, MRI or other procedures ordered by your specialist must be performed at a [facility] facility or coordinated by your clinic's referral office.  >>  List of current medications   >>  This referral request   >>  Any documents/labs given to you for this referral                  Follow-up notes from your care team     Return in about 6 weeks (around 8/23/2017) for next OB visit, Return OB.      Your next 10 appointments already scheduled     Jul 25, 2017 12:45 PM CDT   Genetic Counseling with UR GEN COUNSELOR 2   [facility] Maternal Fetal Medicine [facility] ([facility])    [address]  [facility] [address]   [phone]            Jul 25, 2017   1:30 PM CDT   MFM NUCHAL TRANSLUCENCY with [id number]   [facility] [place] ([facility])    [address]  [facility] [address] [phone]            Jul 25, 2017  2:00 PM CDT   Radiology MD with JAMARI HURST MD   [facility] [place] ([facility])    [address]  [facility] [address]   [phone]           Please arrive at the time given for your first appointment.  This visit is used internally to schedule the physician's time during your ultrasound.            Aug 22, 2017  8:45 AM CDT   RETURN OB with KELSEY Hannah CNM   [facility] ([facility])    [place] [facility] [address]  [address]  [facility] [id number] [phone]              Future tests that were ordered for you today     Open Future Orders        Priority Expected Expires Ordered    MFM Genetic Counseling Routine  7/12/2018 7/12/2017    MFM Nuchal Transluc w US Single Routine  5/12/2018 7/12/2017            Who to contact     Please call your clinic at [phone] to:    Ask questions about your health    Make or cancel appointments    Discuss your medicines    Learn about your test results    Speak to your doctor   If you have compliments or concerns about an experience at your clinic, or if you wish to file a complaint, please contact [facility] Patient Relations at [phone] or email us at [email or web address]         Additional Information About Your Visit        MyChart Information     ChatStathart gives you secure access to your electronic health record. If you see a primary care provider, you can also send messages to your care team and make appointments. If you have questions, please call your primary care clinic.  If you do not have a primary care provider, please call [phone] and  they will assist you.      Vormetric is an electronic gateway that provides easy, online access to your medical records. With Vormetric, you can request a clinic appointment, read your test results, renew a prescription or communicate with your care team.     To access your existing account, please contact your [facility] or call [phone] for assistance.        Care EveryWhere ID     This is your Care EveryWhere ID. This could be used by other organizations to access your [place] medical records  [id number]        Your Vitals Were     Pulse Last Period Breastfeeding? BMI (Body Mass Index)          82 04/24/2017 (Approximate) No 19.9 kg/m2         Blood Pressure from Last 3 Encounters:   07/12/17 102/64   06/29/17 100/70    Weight from Last 3 Encounters:   07/12/17 51.9 kg (114 lb 6.4 oz)   06/29/17 51.8 kg (114 lb 1.6 oz)              We Performed the Following     Chlamydia Trachomatis PCR [IMY280]     Gonorrhea PCR [KHF7329]     Maternal Fetal Medicine Center Referral [9046.022]     Obtaining, preparing and conveyance of cervical or vaginal smear to laboratory.     Pap imaged thin layer screen reflex to HPV if ASCUS - recommended age 25 - 29 years     TSH with free T4 reflex        Primary Care Provider    None Specified       No primary provider on file.        Equal Access to Services     MICHELLE MARTÍNEZ : Sylvia Salamanca, titus vicente, qaangelique salas, alistair winter. So [facility] [phone].    ATENCIÓN: Si habla español, tiene a tillman disposición servicios gratuitos de asistencia lingüística. Llame al [phone].    We comply with applicable federal civil rights laws and [place] laws. We do not discriminate on the basis of race, color, national origin, age, disability sex, sexual orientation or gender identity.            Thank you!     Thank you for choosing [facility]  for your care. Our goal is always  to provide you with excellent care. Hearing back from our patients is one way we can continue to improve our services. Please take a few minutes to complete the written survey that you may receive in the mail after your visit with us. Thank you!             Your Updated Medication List - Protect others around you: Learn how to safely use, store and throw away your medicines at www.disposemymeds.org.          This list is accurate as of: 7/12/17 12:42 PM.  Always use your most recent med list.                   Brand Name Dispense Instructions for use Diagnosis    levothyroxine 50 MCG tablet    SYNTHROID/LEVOTHROID     Take 50 mcg by mouth daily        prenatal multivitamin  plus iron 27-0.8 MG Tabs per tablet      Take 1 tablet by mouth daily

## 2017-07-12 NOTE — NURSING NOTE
Chief Complaint   Patient presents with     Prenatal Care     NOB 11w2d       See EDILBERTO Cunningham 7/12/2017

## 2017-07-12 NOTE — PATIENT INSTRUCTIONS
"  Thank you for choosing [facility] ([facility]) for your obstetrical care.  We are an integrated health clinic with obstetricians, midwives, a psychologist, an acupuncturist, a nutritionist, a pharmacist, internal medicine and family practice all in one.  If you have questions about services offered please ask.      o Please keep all appointments with your provider.  You will help catch, prevent and treat problems early.  o Review your Expectant Family booklet and folder given to you at this intake visit.  It can answer many basic questions including:    Treatment for nausea and vomiting    Medications that are safe in pregnancy    Healthy diet and weight gain    Exercise and activity  o ASK questions!!  Please use \"Questions for my New OB visit\" form to write down any questions or concerns for your next visit.  o Eat a healthy diet.  Visit www.choosemyplate.gov and click on  Pregnancy and Breastfeeding  for information and tips  o Do not smoke.  Avoid other people's smoke, too.  We are happy to help with referrals to stop smoking programs.  o Do not drink alcohol.  o Try to avoid people who have colds or other infections.  Practice good hand washing.  o Consider registering for our Healthy Pregnancy Class here at [facility].  This class is offered every 3rd Wednesday from 2:30-4:30 p.m.  [place] at [phone] or online at [email or web address] or [email or web address]  o Consider registering for prenatal education classes through [place] at [facility].  You can view class schedules and register online at [email or web address] or call [phone] for questions     For urgent concerns, call [facility] at [phone] to speak with a triage nurse during regular clinic hours (8:00 am - 4:30 pm).  This line is answered by our service 24 hours a day, after hours a provider will return your call.  Thank you for choosing [facility] ([facility]) for your " "obstetrical care.  We are an integrated health clinic with obstetricians, midwives, a psychologist, an acupuncturist, a nutritionist, a pharmacist, internal medicine and family practice all in one.  If you have questions about services offered please ask.      o Please keep all appointments with your provider.  You will help catch, prevent and treat problems early.  o Review your Expectant Family booklet and folder given to you at this intake visit.  It can answer many basic questions including:    Treatment for nausea and vomiting    Medications that are safe in pregnancy    Healthy diet and weight gain    Exercise and activity  o ASK questions!!  Please use \"Questions for my New OB visit\" form to write down any questions or concerns for your next visit.  o Eat a healthy diet.  Visit www.choosemyplate.gov and click on  Pregnancy and Breastfeeding  for information and tips  o Do not smoke.  Avoid other people's smoke, too.  We are happy to help with referrals to stop smoking programs.  o Do not drink alcohol.  o Try to avoid people who have colds or other infections.  Practice good hand washing.  o Consider registering for our Healthy Pregnancy Class here at [facility].  This class is offered every 3rd Wednesday from 2:30-4:30 p.m.  [place] at [phone] or online at [email or web address] or [email or web address]  o Consider registering for prenatal education classes through [place] at [facility].  You can view class schedules and register online at [email or web address] or call [phone] for questions     For urgent concerns, call [facility] at [phone] to speak with a triage nurse during regular clinic hours (8:00 am - 4:30 pm).  This line is answered by our service 24 hours a day, after hours a provider will return your call.  "

## 2017-07-12 NOTE — NURSING NOTE
Breastfeeding education provided:  - Benefits of Breastfeeding     Spoke with Altamirano about breastfeeding. She is interested in trying with her first baby. Went over Great Expectations page 53.

## 2017-07-12 NOTE — PROGRESS NOTES
SUBJECTIVE:    26 year old, female, , 11w2d,  who presents to the clinic today for a new ob visit. Feels well overall. Has started PNV. Discussed that pt's family is back in [place] but her mother is applying for visa to come to be with Altamirano in labor.   Estimated Date of Delivery: 2018     OTHER CONCERNS: Decreased appetite, mild nausea occasionally. Still using aashish, which helps. Otherwise feeling well.      ===========================================  ROS   10-point ROS negative save for items described above.   PSYCHIATRIC:  Denies mood changes, difficulty concentrating, depression, anxiety, panic attacks or post partum depression    Social History   Substance Use Topics     Smoking status: Never Smoker     Smokeless tobacco: Never Used     Alcohol use No     History   Drug Use No     History   Smoking Status     Never Smoker   Smokeless Tobacco     Never Used       Alcohol use No     No family history on file.  ============================================  MEDICAL HISTORY   No Known Allergies      Current Outpatient Prescriptions:      levothyroxine (SYNTHROID/LEVOTHROID) 50 MCG tablet, Take 50 mcg by mouth daily, Disp: , Rfl:      Prenatal Vit-Fe Fumarate-FA (PRENATAL MULTIVITAMIN  PLUS IRON) 27-0.8 MG TABS per tablet, Take 1 tablet by mouth daily, Disp: , Rfl:     Past Medical History:   Diagnosis Date     Hypothyroidism 2017    50mcg levothyroxine       No past surgical history on file.         Obstetric History       T0      L0     SAB0   TAB0   Ectopic0   Multiple0   Live Births0       # Outcome Date GA Lbr Nicho/2nd Weight Sex Delivery Anes PTL Lv   1 Current                   GYN History- No Abnormal Pap Smears                        Cervical procedures: None                        History of STI: None    I personally reviewed the past social/family/medical and surgical history on the date of service.   I reviewed lab work done at Intake visit with patient.    OBJECTIVE:    PHYSICAL EXAM:  /64 (BP Location: Left arm, Patient Position: Chair)  Pulse 82  Wt 51.9 kg (114 lb 6.4 oz)  LMP 04/24/2017 (Approximate)  Breastfeeding? No  BMI 19.9 kg/m2  BMI- Body mass index is 19.9 kg/(m^2)., GENERAL:  Pleasant pregnant female, alert, cooperative and well groomed.  SKIN:  Warm and dry, without lesions or rashes  HEAD: Symmetrical features.  MOUTH:  Buccal mucosa pink, moist without lesions.  Teeth in good repair.    NECK:  Thyroid without enlargement and nodules.  Lymph nodes not palpable.  LUNGS:  Clear to auscultation.  BREAST:    No dominant, fixed or suspicious masses are noted.  No skin or nipple changes or axillary nodes.   Nipples everted.    HEART:  RRR without murmur.  ABDOMEN: Soft without masses , tenderness or organomegaly.  No CVA tenderness.  Uterus palpable at size equal to dates.  No scars noted.. Fetal heart tones present.  MUSCULOSKELETAL:  Full range of motion  EXTREMITIES:  No edema. No significant varicosities.   PELVIC EXAM:  GENITALIA: EGBUS  External genitalia, Bartholin's glands, urethra & [place]'s glands:normal. Vulva reveals no erythema or lesions.        VAGINA:  pink, normal rugae and discharge, no lesions, good tone.   CERVIX:  smooth, without discharge or CMT.                UTERUS: Anteverted,  nontender 12 week size.   ADNEXA:  Without masses or tenderness.   RECTAL:  Normal appearance.  Digital exam deferred.  WET PREP:Not done  GC/CHLAMYDIA CULTURE OBTAINED:YES on pap    ASSESSMENT:  Intrauterine pregnancy 11w2d size consistent with dates  Genetic Screening: First Trimester Screen    Encounter Diagnoses   Name Primary?     Normal first pregnancy in first trimester Yes     Hypothyroidism, unspecified type         PLAN:  Orders Placed This Encounter   Procedures     Obtaining, preparing and conveyance of cervical or vaginal smear to laboratory.     TSH with free T4 reflex     Pap imaged thin layer screen reflex to HPV if ASCUS - recommended age 25 -  29 years     M[facility]etal [facility] Center Referral [9046.022]     - Reviewed use of triage nurse line and contacting the on-call provider after hours for an urgent need such as fever, vagina bleeding, bladder or vaginal infection, rupture of membranes,  or term labor.    - Reviewed best evidence for: weight gain for her weight and height for pregnancy:  RECOMMENDED WEIGHT GAIN: 25-35 lbs.   healthy diet and foods to avoid; encouraged 5 protein snacks per day; exercise and activity during pregnancy;avoiding exposure to toxoplasmosis; and maintenance of a generally healthy lifestyle.   - Discussed the harms, benefits, side effects and alternative therapies for current prescribed and OTC medications.  - All pt's and FOB's  questions discussed and answered.  Pt verbalized understanding of and agreement to plan of care.   - Reviewed CNM versus MD care - patient would like to decide after first trimester screening results.   - Reviewed implications of GBS bacteruria. Pt agrees to IV antibiotics in labor.   - Reviewed r/b/a to first trimester screen and level II ultrasound - pt desires both. Advised to go to [facility] after visit to schedule first trimester screen.   - Continue scheduled prenatal care and prn if questions or concerns. RTC 6 weeks.     Davina Zhao, KELSEY CNM               Answers for HPI/ROS submitted by the patient on 2017   ALEX 7 TOTAL SCORE: 1  PHQ-2 Score: 0

## 2017-07-12 NOTE — LETTER
Date:July 13, 2017      Patient was self referred, no letter generated. Do not send.        [facility] Health Information

## 2017-07-13 LAB
C TRACH DNA SPEC QL NAA+PROBE: NORMAL
N GONORRHOEA DNA SPEC QL NAA+PROBE: NORMAL
SPECIMEN SOURCE: NORMAL
SPECIMEN SOURCE: NORMAL

## 2017-07-17 LAB
COPATH REPORT: NORMAL
PAP: NORMAL

## 2017-07-25 ENCOUNTER — HOSPITAL ENCOUNTER (OUTPATIENT)
Dept: ULTRASOUND IMAGING | Facility: CLINIC | Age: 27
Discharge: HOME | End: 2017-07-25
Attending: ADVANCED PRACTICE MIDWIFE | Admitting: OBSTETRICS & GYNECOLOGY
Payer: COMMERCIAL

## 2017-07-25 ENCOUNTER — OFFICE VISIT (OUTPATIENT)
Dept: MATERNAL FETAL MEDICINE | Facility: CLINIC | Age: 27
End: 2017-07-25
Attending: ADVANCED PRACTICE MIDWIFE
Payer: COMMERCIAL

## 2017-07-25 DIAGNOSIS — R82.71 GBS BACTERIURIA: ICD-10-CM

## 2017-07-25 DIAGNOSIS — Z36.9 FIRST TRIMESTER SCREENING: ICD-10-CM

## 2017-07-25 DIAGNOSIS — Z36.9 1ST TRIMESTER SCREENING: Primary | ICD-10-CM

## 2017-07-25 DIAGNOSIS — O26.90 PREGNANCY RELATED CONDITION, UNSPECIFIED TRIMESTER: ICD-10-CM

## 2017-07-25 DIAGNOSIS — Z36.89 ENCOUNTER FOR FETAL ANATOMIC SURVEY: Primary | ICD-10-CM

## 2017-07-25 PROCEDURE — 76813 OB US NUCHAL MEAS 1 GEST: CPT

## 2017-07-25 PROCEDURE — 84704 HCG FREE BETACHAIN TEST: CPT | Performed by: OBSTETRICS & GYNECOLOGY

## 2017-07-25 PROCEDURE — 96040 ZZH GENETIC COUNSELING, EACH 30 MINUTES: CPT | Mod: ZF | Performed by: GENETIC COUNSELOR, MS

## 2017-07-25 PROCEDURE — 36415 COLL VENOUS BLD VENIPUNCTURE: CPT | Performed by: OBSTETRICS & GYNECOLOGY

## 2017-07-25 PROCEDURE — 84163 PAPPA SERUM: CPT | Performed by: OBSTETRICS & GYNECOLOGY

## 2017-07-25 NOTE — PROGRESS NOTES
"Please see \"Imaging\" tab under \"Chart Review\" for details of today's ultrasound.    Edmar Blank M.D.  Specialist in Maternal-Fetal Medicine     "

## 2017-07-25 NOTE — PROGRESS NOTES
[facility] Maternal Fetal Medicine Center  Genetic Counseling Consult    Patient: Evelia Jansen YOB: 1990   Date of Service: 17      Evelia Jansen was seen with her  Paige at [facility] for genetic consultation to discuss the options for routine screening for fetal chromosome abnormalities.       Impression/Plan:   1.  Evelia had an ultrasound and blood draw for first trimester screening.  Results are expected within 7 days, and will be available in [employer].  We will contact her to discuss the results, and a copy will be forwarded to the office of the referring OB provider.    2. Maternal serum AFP (single marker screen) is recommended after 15 weeks to screen for open neural tube defects. A quad screen should not be performed.  3.  Patient stated that if there were complicated medical issues, they may benefit from an .     Pregnancy History:   /Parity:    Age at Delivery: 27 year old  JOHNATHAN: 2018, Date entered prior to episode creation  Gestational Age: 13w1d    No significant complications or exposures were reported in the current pregnancy.        Medical History:   Evelia has hypothyroidsim and is taking levothyroxine.        Family History:   A three-generation pedigree was obtained, and is scanned under the  Media  tab.   The following significant findings were reported by Evelia:    Ninfa is at home.  Paige is a PhD student in computer science.     Paige's father  at 36 yrs of liver cancer.  No other family members were reported with cancer.  Paige did not believe that his father had risk factors.  We reviewed that his doctor should be made aware of this family history.    Otherwise, the reported family history is negative for multiple miscarriages, stillbirths, birth defects, mental retardation, known genetic conditions, and consanguinity.       Carrier Screening:   The patient reports that she and the father of the pregnancy have   ancestry:      The hemoglobinopathies are a group of genetic blood diseases that occur with increased frequency in individuals of  ancestry and carrier screening for these conditions is available.  Carrier screening for the hemoglobinopathies includes a CBC with red blood cell indices, a ferritin level, and a quantitative hemoglobin electrophoresis or HPLC.  In addition,  screening in the [facility] includes many of the hemoglobinopathies. Patient has normal MCV.          Expanded carrier screening for mutations in a large panel of genes associated with autosomal recessive conditions including cystic fibrosis, spinal muscular atrophy, and others, is now available.      The patient was not certain about whether to pursue carrier screening today.  She was provided with a brochure about her testing options, and contact information if she has questions or wishes to pursue screening.       Risk Assessment for Chromosome Conditions:     We explained that the risk for fetal chromosome abnormalities increases with maternal age. We discussed specific features of common chromosome abnormalities, including Down syndrome, trisomy 13, trisomy 18, and sex chromosome trisomies.- At age 27 at delivery, the risk to have a baby with Down syndrome is 1 in 1111.     - At age 27 at delivery, the risk to have a baby with any chromosome abnormality is 1 in 455.          Testing Options:   We discussed the following options:   First trimester screening    First trimester ultrasound with nuchal translucency and nasal bone assessments, maternal plasma hCG, ITALIA-A, and AFP measurement    Screens for fetal trisomy 21, trisomy 13, and trisomy 18    Cannot screen for open neural tube defects; maternal serum AFP after 15 weeks is recommended     Non-invasive Prenatal Testing (NIPT)    Maternal plasma cell-free DNA testing; first trimester ultrasound with nuchal translucency and nasal bone assessment is recommended, when  appropriate    Screens for fetal trisomy 21, trisomy 13, trisomy 18, and sex chromosome aneuploidy    Cannot screen for open neural tube defects; maternal serum AFP after 15 weeks is recommended     Chorionic villus sampling (CVS)    Invasive procedure typically performed in the first trimester by which placental villi are obtained for the purpose of chromosome analysis and/or other prenatal genetic analysis    Diagnostic results; >99% sensitivity for fetal chromosome abnormalities    Cannot test for open neural tube defects; maternal serum AFP after 15 weeks is recommended     Genetic Amniocentesis    Invasive procedure typically performed in the second trimester by which amniotic fluid is obtained for the purpose of chromosome analysis and/or other prenatal genetic analysis    Diagnostic results; >99% sensitivity for fetal chromosome abnormalities    AFAFP measurement tests for open neural tube defects     Comprehensive (Level II) ultrasound: Detailed ultrasound performed between 18-22 weeks gestation to screen for major birth defects and markers for aneuploidy.        We reviewed the benefits and limitations of this testing.  Screening tests provide a risk assessment specific to the pregnancy for certain fetal chromosome abnormalities, but cannot definitively diagnose or exclude a fetal chromosome abnormality.  Follow-up genetic counseling and consideration of diagnostic testing is recommended with any abnormal screening result.      It was a pleasure to be involved with Altamirano s care. Face-to-face time of the meeting was 30 minutes.    Julieta Mcneill [facility]  Certified Genetic Counselor  Pager: [phone]

## 2017-07-25 NOTE — MR AVS SNAPSHOT
After Visit Summary   7/25/2017    Evelia Jansen    MRN: [id number]           Patient Information     Date Of Birth          1990        Visit Information        Provider Department      7/25/2017 12:45 PM Julieta Mcneill [facility] Maternal Fetal Medicine [facility]        Today's Diagnoses     1St trimester screening    -  1    Pregnancy related condition, unspecified trimester        GBS bacteriuria           Follow-ups after your visit        Your next 10 appointments already scheduled     Jul 25, 2017  2:00 PM CDT   Radiology MD with Edmar Blank MD   [facility]aternal Fetal [facility] ([facility])    [address]  [facility] [address]   [phone]           Please arrive at the time given for your first appointment.  This visit is used internally to schedule the physician's time during your ultrasound.            Aug 22, 2017  8:45 AM CDT   RETURN OB with KELSEY Hannah CNM   [facility] ([facility])    [place] [facility] [address]  [address]  [facility] [id number] [phone]              Who to contact     If you have questions or need follow up information about today's clinic visit or your schedule please contact [facility]ERNAL FETAL MEDICINE [facility] directly at [phone].  Normal or non-critical lab and imaging results will be communicated to you by MyChart, letter or phone within 4 business days after the clinic has received the results. If you do not hear from us within 7 days, please contact the clinic through MyChart or phone. If you have a critical or abnormal lab result, we will notify you by phone as soon as possible.  Submit refill requests through [employer] or call your pharmacy and they will forward the refill request to us. Please allow 3 business days for your refill to be completed.          Additional Information About Your Visit        [employer]  Information     [employer] gives you secure access to your electronic health record. If you see a primary care provider, you can also send messages to your care team and make appointments. If you have questions, please call your primary care clinic.  If you do not have a primary care provider, please call [phone] and they will assist you.        Care EveryWhere ID     This is your Care EveryWhere ID. This could be used by other organizations to access your [place] medical records  [id number]        Your Vitals Were     Last Period                   04/24/2017 (Approximate)            Blood Pressure from Last 3 Encounters:   07/12/17 102/64   06/29/17 100/70    Weight from Last 3 Encounters:   07/12/17 51.9 kg (114 lb 6.4 oz)   06/29/17 51.8 kg (114 lb 1.6 oz)              We Performed the Following     First Trimester Screen Biochem Markers     MFM Genetic Counseling        Primary Care Provider    None Specified       No primary provider on file.        Equal Access to Services     MICHELLE MARTÍNEZ : Hadii sarah Salamanca, wamichael vicente, nelly kaalmada maritza, alistair suazo . So [facility] [phone].    ATENCIÓN: Si habla español, tiene a tillman disposición servicios gratuitos de asistencia lingüística. Liangame al [phone].    We comply with applicable federal civil rights laws and [place] laws. We do not discriminate on the basis of race, color, national origin, age, disability sex, sexual orientation or gender identity.            Thank you!     Thank you for choosing [facility]  for your care. Our goal is always to provide you with excellent care. Hearing back from our patients is one way we can continue to improve our services. Please take a few minutes to complete the written survey that you may receive in the mail after your visit with us. Thank you!             Your Updated Medication List - Protect others around you: Learn how to safely  use, store and throw away your medicines at www.disposemymeds.org.          This list is accurate as of: 7/25/17  1:41 PM.  Always use your most recent med list.                   Brand Name Dispense Instructions for use Diagnosis    levothyroxine 50 MCG tablet    SYNTHROID/LEVOTHROID     Take 50 mcg by mouth daily        prenatal multivitamin  plus iron 27-0.8 MG Tabs per tablet      Take 1 tablet by mouth daily

## 2017-07-25 NOTE — MR AVS SNAPSHOT
After Visit Summary   7/25/2017    Evelia Jansen    MRN: [id number]           Patient Information     Date Of Birth          1990        Visit Information        Provider Department      7/25/2017 2:00 PM Edmar Blank MD [facility] Maternal Fetal Medicine [facility]        Today's Diagnoses     Encounter for fetal anatomic survey    -  1    First trimester screening           Follow-ups after your visit        Your next 10 appointments already scheduled     Aug 22, 2017  8:45 AM CDT   RETURN OB with KELSEY Hannah CNM   [facility] ([facility])    [place] [facility] [address]  [address]  [address]  [facility] [id number] [phone]              Future tests that were ordered for you today     Open Future Orders        Priority Expected Expires Ordered    M US Comprehensive Single Routine  5/25/2018 7/25/2017            Who to contact     If you have questions or need follow up information about today's clinic visit or your schedule please contact [facility]ERNAL FETAL MEDICINE [facility] directly at [phone].  Normal or non-critical lab and imaging results will be communicated to you by MyChart, letter or phone within 4 business days after the clinic has received the results. If you do not hear from us within 7 days, please contact the clinic through [employer] or phone. If you have a critical or abnormal lab result, we will notify you by phone as soon as possible.  Submit refill requests through [employer] or call your pharmacy and they will forward the refill request to us. Please allow 3 business days for your refill to be completed.          Additional Information About Your Visit        MyChart Information     [employer] gives you secure access to your electronic health record. If you see a primary care provider, you can also send messages to your care team and make appointments. If you have questions, please call your primary care  clinic.  If you do not have a primary care provider, please call [phone] and they will assist you.        Care EveryWhere ID     This is your Care EveryWhere ID. This could be used by other organizations to access your [place] medical records  [id number]        Your Vitals Were     Last Period                   04/24/2017 (Approximate)            Blood Pressure from Last 3 Encounters:   07/12/17 102/64   06/29/17 100/70    Weight from Last 3 Encounters:   07/12/17 51.9 kg (114 lb 6.4 oz)   06/29/17 51.8 kg (114 lb 1.6 oz)               Primary Care Provider    None Specified       No primary provider on file.        Equal Access to Services     ANAHY [facility] : Sylvia Salamanca, titus vicente, nelly salas, alistair suazo . So [facility] [phone].    ATENCIÓN: Si habla español, tiene a tillman disposición servicios gratuitos de asistencia lingüística. Llame al [phone].    We comply with applicable federal civil rights laws and [place] laws. We do not discriminate on the basis of race, color, national origin, age, disability sex, sexual orientation or gender identity.            Thank you!     Thank you for choosing [facility]  for your care. Our goal is always to provide you with excellent care. Hearing back from our patients is one way we can continue to improve our services. Please take a few minutes to complete the written survey that you may receive in the mail after your visit with us. Thank you!             Your Updated Medication List - Protect others around you: Learn how to safely use, store and throw away your medicines at www.disposemymeds.org.          This list is accurate as of: 7/25/17  2:46 PM.  Always use your most recent med list.                   Brand Name Dispense Instructions for use Diagnosis    levothyroxine 50 MCG tablet    SYNTHROID/LEVOTHROID     Take 50 mcg by mouth daily        prenatal  multivitamin  plus iron 27-0.8 MG Tabs per tablet      Take 1 tablet by mouth daily

## 2017-07-28 ENCOUNTER — TELEPHONE (OUTPATIENT)
Dept: MATERNAL FETAL MEDICINE | Facility: CLINIC | Age: 27
End: 2017-07-28

## 2017-07-28 NOTE — TELEPHONE ENCOUNTER
"July 28, 2017. Contacted Evelia Jansen on phone to review her first trimester screen results, which yielded an adjusted fetal Down syndrome risk and fetal trisomies 13/18 risk in the \"low risk\" category.  Reviewed that the chance of Down syndrome is now 1 in 10,000 and the chance of trisomy 13/18 is 1 in 10,000  (see scanned report for further details).     Plan: 1. Results routed/faxed to primary clinic.           2.  Patient should be offered MSAFP at 15 weeks gestation.            3.  Routine follow up with primary Ob/Gyn.    Julieta Mcneill MS, [facility]  [phone]  "

## 2017-07-31 LAB — LAB SCANNED RESULT: NORMAL

## 2017-08-22 ENCOUNTER — OFFICE VISIT (OUTPATIENT)
Dept: OBGYN | Facility: CLINIC | Age: 27
End: 2017-08-22
Attending: ADVANCED PRACTICE MIDWIFE
Payer: COMMERCIAL

## 2017-08-22 VITALS
HEIGHT: 64 IN | DIASTOLIC BLOOD PRESSURE: 62 MMHG | BODY MASS INDEX: 20.66 KG/M2 | WEIGHT: 121 LBS | SYSTOLIC BLOOD PRESSURE: 108 MMHG | HEART RATE: 85 BPM

## 2017-08-22 DIAGNOSIS — Z34.01 NORMAL FIRST PREGNANCY IN FIRST TRIMESTER: Primary | ICD-10-CM

## 2017-08-22 PROCEDURE — 36415 COLL VENOUS BLD VENIPUNCTURE: CPT | Performed by: ADVANCED PRACTICE MIDWIFE

## 2017-08-22 PROCEDURE — 82105 ALPHA-FETOPROTEIN SERUM: CPT | Performed by: ADVANCED PRACTICE MIDWIFE

## 2017-08-22 PROCEDURE — 99212 OFFICE O/P EST SF 10 MIN: CPT | Mod: ZF

## 2017-08-22 NOTE — LETTER
"2017       RE: Evelia Jansen  [address]  [address]  [facility] [address]     Dear Colleague,    Thank you for referring your patient, Evelia Jansen, to the [facility]. Please see a copy of my visit note below.    Subjective:      27 year old  at 17w1d presents for a routine prenatal appointment.       No vaginal bleeding or leakage of fluid.  No contractions or cramping.    No fetal movement yet.       No HA, visual changes, RUQ or epigastric pain.   The patient presents with the following concerns: Several q's answered r/t fetal position, sleep position, safety of prenatal vitamins.   Level II US  Scheduled   Offered AFP - undecided but order placed. Pt knows to have drawn before 20 weeks.      Objective:  Vitals:    17 0848   BP: 108/62   BP Location: Left arm   Patient Position: Chair   Cuff Size: Adult Regular   Pulse: 85   Weight: 54.9 kg (121 lb)   Height: 1.615 m (5' 3.58\")     See OB flowsheet    Assessment/Plan     Encounter Diagnosis   Name Primary?     Normal first pregnancy in first trimester Yes     Orders Placed This Encounter   Procedures     AFP - Alpha Fetoprotein Maternal Screen       - Reviewed total weight gain, encouraged continued healthy diet and exercise.      - Reviewed why/how to contact provider.   - Return to clinic in 4-6 weeks and prn if questions or concerns.     KELSEY Li CNM                  Again, thank you for allowing me to participate in the care of your patient.      Sincerely,    KELSEY Li CNM      "

## 2017-08-22 NOTE — NURSING NOTE
Chief Complaint   Patient presents with     Prenatal Care     17 weeks and 1 day       Donna Romeo, [facility] 8/22/2017

## 2017-08-22 NOTE — PROGRESS NOTES
"Subjective:      27 year old  at 17w1d presents for a routine prenatal appointment.       No vaginal bleeding or leakage of fluid.  No contractions or cramping.    No fetal movement yet.       No HA, visual changes, RUQ or epigastric pain.   The patient presents with the following concerns: Several q's answered r/t fetal position, sleep position, safety of prenatal vitamins.   Level II US  Scheduled   Offered AFP - undecided but order placed. Pt knows to have drawn before 20 weeks.      Objective:  Vitals:    17 0848   BP: 108/62   BP Location: Left arm   Patient Position: Chair   Cuff Size: Adult Regular   Pulse: 85   Weight: 54.9 kg (121 lb)   Height: 1.615 m (5' 3.58\")     See OB flowsheet    Assessment/Plan     Encounter Diagnosis   Name Primary?     Normal first pregnancy in first trimester Yes     Orders Placed This Encounter   Procedures     AFP - Alpha Fetoprotein Maternal Screen       - Reviewed total weight gain, encouraged continued healthy diet and exercise.      - Reviewed why/how to contact provider.   - Return to clinic in 4-6 weeks and prn if questions or concerns.     Davina Zhao, KELSEY YOUNGERM                "

## 2017-08-22 NOTE — LETTER
Date:August 23, 2017      Patient was self referred, no letter generated. Do not send.        [facility] Health Information

## 2017-08-23 LAB
# FETUSES US: NORMAL
AFP ADJ MOM AMN: 1.55
AFP SERPL-MCNC: 68 NG/ML
AGE - REPORTED: 27.5 YR
DATING METHOD: NORMAL
DIABETIC AT CONCEPTION: NO
FAMILY MEMBER DISEASES HX: NO
FAMILY MEMBER DISEASES HX: NO
GA METHOD: NORMAL
GA: 17.14 WEEKS
HX OF HEREDITARY DISORDERS: NO
IDDM PATIENT QL: NO
INTEGRATED SCN PATIENT-IMP: NORMAL
LMP START DATE: NORMAL
PREV HX CHROMOSOME ABNORMALITY: NORMAL
SPECIMEN DRAWN SERPL: NORMAL
TWINS: NO

## 2017-08-30 ENCOUNTER — HOSPITAL ENCOUNTER (OUTPATIENT)
Dept: ULTRASOUND IMAGING | Facility: CLINIC | Age: 27
Discharge: HOME | End: 2017-08-30
Attending: ADVANCED PRACTICE MIDWIFE | Admitting: OBSTETRICS & GYNECOLOGY
Payer: COMMERCIAL

## 2017-08-30 ENCOUNTER — OFFICE VISIT (OUTPATIENT)
Dept: MATERNAL FETAL MEDICINE | Facility: CLINIC | Age: 27
End: 2017-08-30
Attending: ADVANCED PRACTICE MIDWIFE
Payer: COMMERCIAL

## 2017-08-30 DIAGNOSIS — Z36.89 ENCOUNTER FOR FETAL ANATOMIC SURVEY: Primary | ICD-10-CM

## 2017-08-30 DIAGNOSIS — Z36.89 ENCOUNTER FOR FETAL ANATOMIC SURVEY: ICD-10-CM

## 2017-08-30 PROCEDURE — 76811 OB US DETAILED SNGL FETUS: CPT

## 2017-08-30 NOTE — PROGRESS NOTES
"Please see \"Imaging\" tab under \"Chart Review\" for details of today's US.    Nirmala Martinez, DO    "

## 2017-08-30 NOTE — MR AVS SNAPSHOT
After Visit Summary   8/30/2017    Evelia Jansen    MRN: [id number]           Patient Information     Date Of Birth          1990        Visit Information        Provider Department      8/30/2017 10:00 AM Nirmala Martinez,  [facility]        Today's Diagnoses     Encounter for fetal anatomic survey    -  1    Choroid plexus cyst of fetus, not applicable or unspecified fetus           Follow-ups after your visit        Your next 10 appointments already scheduled     Oct 03, 2017  8:45 AM CDT   RETURN OB with KELSEY Mejia CNM   [facility] ([facility]    [facility] [address]  [address]  [facility] [address] [phone]              Who to contact     If you have questions or need follow up information about today's clinic visit or your schedule please contact [facility] directly at [phone].  Normal or non-critical lab and imaging results will be communicated to you by K[employer], letter or phone within 4 business days after the clinic has received the results. If you do not hear from us within 7 days, please contact the clinic through [employer] or phone. If you have a critical or abnormal lab result, we will notify you by phone as soon as possible.  Submit refill requests through [employer] or call your pharmacy and they will forward the refill request to us. Please allow 3 business days for your refill to be completed.          Additional Information About Your Visit        MyChart Information     [employer] gives you secure access to your electronic health record. If you see a primary care provider, you can also send messages to your care team and make appointments. If you have questions, please call your primary care clinic.  If you do not have a primary care provider, please call [phone] and they will assist you.        Care EveryWhere ID     This is  your Care EveryWhere ID. This could be used by other organizations to access your [place] medical records  [id number]        Your Vitals Were     Last Period                   04/24/2017 (Approximate)            Blood Pressure from Last 3 Encounters:   08/22/17 108/62   07/12/17 102/64   06/29/17 100/70    Weight from Last 3 Encounters:   08/22/17 54.9 kg (121 lb)   07/12/17 51.9 kg (114 lb 6.4 oz)   06/29/17 51.8 kg (114 lb 1.6 oz)              Today, you had the following     No orders found for display       Primary Care Provider    None Specified       No primary provider on file.        Equal Access to Services     ANAHY MARTÍNEZ : Sylvia Salamanca, titus vicente, nelly salas, alistair suazo . So [facility] [phone].    ATENCIÓN: Si habla español, tiene a tillman disposición servicios gratuitos de asistencia lingüística. Llame al [phone].    We comply with applicable federal civil rights laws and [place] laws. We do not discriminate on the basis of race, color, national origin, age, disability sex, sexual orientation or gender identity.            Thank you!     Thank you for choosing [facility]  for your care. Our goal is always to provide you with excellent care. Hearing back from our patients is one way we can continue to improve our services. Please take a few minutes to complete the written survey that you may receive in the mail after your visit with us. Thank you!             Your Updated Medication List - Protect others around you: Learn how to safely use, store and throw away your medicines at www.disposemymeds.org.          This list is accurate as of: 8/30/17 10:53 AM.  Always use your most recent med list.                   Brand Name Dispense Instructions for use Diagnosis    levothyroxine 50 MCG tablet    SYNTHROID/LEVOTHROID     Take 50 mcg by mouth daily        prenatal multivitamin plus iron 27-0.8 MG Tabs  per tablet      Take 1 tablet by mouth daily

## 2017-10-03 ENCOUNTER — OFFICE VISIT (OUTPATIENT)
Dept: OBGYN | Facility: CLINIC | Age: 27
End: 2017-10-03
Attending: ADVANCED PRACTICE MIDWIFE
Payer: COMMERCIAL

## 2017-10-03 VITALS
BODY MASS INDEX: 22.2 KG/M2 | DIASTOLIC BLOOD PRESSURE: 64 MMHG | WEIGHT: 130 LBS | HEIGHT: 64 IN | HEART RATE: 83 BPM | SYSTOLIC BLOOD PRESSURE: 103 MMHG

## 2017-10-03 DIAGNOSIS — E03.9 HYPOTHYROIDISM, UNSPECIFIED TYPE: ICD-10-CM

## 2017-10-03 DIAGNOSIS — Z34.01 NORMAL FIRST PREGNANCY IN FIRST TRIMESTER: Primary | ICD-10-CM

## 2017-10-03 LAB — TSH SERPL DL<=0.005 MIU/L-ACNC: 1.55 MU/L (ref 0.4–4)

## 2017-10-03 PROCEDURE — 84443 ASSAY THYROID STIM HORMONE: CPT | Performed by: ADVANCED PRACTICE MIDWIFE

## 2017-10-03 PROCEDURE — 36415 COLL VENOUS BLD VENIPUNCTURE: CPT | Performed by: ADVANCED PRACTICE MIDWIFE

## 2017-10-03 PROCEDURE — 99211 OFF/OP EST MAY X REQ PHY/QHP: CPT | Mod: ZF

## 2017-10-03 NOTE — PROGRESS NOTES
"Subjective:     27 year old  at 23w1d presents for a routine prenatal appointment.      Denies vaginal bleeding or leakage of fluid.  Denies contractions or cramping. + fetal movement.       No HA, visual changes, RUQ or epigastric pain.   The patient presents with the following concerns: choroid plexus cysts noted on ultrasound, thyroid medication  Level II US  Results reviewed normal scan- bilateral CPCs      Objective:  Vitals:    10/03/17 0843   BP: 103/64   Pulse: 83   Weight: 59 kg (130 lb)   Height: 1.615 m (5' 3.58\")     See OB flowsheet    Assessment/Plan  Encounter Diagnoses   Name Primary?     Normal first pregnancy in first trimester - [facility] CNM Yes     Hypothyroidism, unspecified type      Orders Placed This Encounter   Procedures     TSH with free T4 reflex     - Reviewed total weight gain, encouraged continued healthy diet and exercise.      - Reviewed why/how to contact provider.   - Patient education/orders or handouts today: PTL signs/symptoms, Fetal movement and Plan for EOB visit w labs   - Repeat TSH today  - Return to clinic in 4-5 weeks and prn if questions or concerns.   "

## 2017-10-03 NOTE — LETTER
Date:October 4, 2017      Patient was self referred, no letter generated. Do not send.        [facility] Information

## 2017-10-03 NOTE — LETTER
"10/3/2017       RE: Evelia Jansen  [address]  [address]  [place] [address]     Dear Colleague,    Thank you for referring your patient, Evelia Jansen, to the [facility] at [facility]. Please see a copy of my visit note below.    Subjective:     27 year old  at 23w1d presents for a routine prenatal appointment.      Denies vaginal bleeding or leakage of fluid.  Denies contractions or cramping. + fetal movement.       No HA, visual changes, RUQ or epigastric pain.   The patient presents with the following concerns: choroid plexus cysts noted on ultrasound, thyroid medication  Level II US  Results reviewed normal scan- bilateral CPCs      Objective:  Vitals:    10/03/17 0843   BP: 103/64   Pulse: 83   Weight: 59 kg (130 lb)   Height: 1.615 m (5' 3.58\")     See OB flowsheet    Assessment/Plan  Encounter Diagnoses   Name Primary?     Normal first pregnancy in first trimester - [facility] CNM Yes     Hypothyroidism, unspecified type      Orders Placed This Encounter   Procedures     TSH with free T4 reflex     - Reviewed total weight gain, encouraged continued healthy diet and exercise.      - Reviewed why/how to contact provider.   - Patient education/orders or handouts today: PTL signs/symptoms, Fetal movement and Plan for EOB visit w labs   - Repeat TSH today  - Return to clinic in 4-5 weeks and prn if questions or concerns.     Again, thank you for allowing me to participate in the care of your patient.      Sincerely,    KELSEY Zhou CNM      "

## 2017-10-03 NOTE — MR AVS SNAPSHOT
After Visit Summary   10/3/2017    Evelia Jansen    MRN: [id number]           Patient Information     Date Of Birth          1990        Visit Information        Provider Department      10/3/2017 8:45 AM Rafael Aguilar APRN [facility]        Today's Diagnoses     Normal first pregnancy in first trimester - S CNM    -  1    Hypothyroidism, unspecified type           Follow-ups after your visit        Follow-up notes from your care team     Return in about 4 weeks (around 10/31/2017) for EOB Visit.      Your next 10 appointments already scheduled     Nov 01, 2017  8:45 AM CDT   Return Obstetrics Extended with Gabe Medina CNM   [facility] ([facility])    [facility] [address]  [address]  [address]  [facility] [address] [phone]              Who to contact     Please call your clinic at [phone] to:    Ask questions about your health    Make or cancel appointments    Discuss your medicines    Learn about your test results    Speak to your doctor   If you have compliments or concerns about an experience at your clinic, or if you wish to file a complaint, please contact [facility] Patient Relations at [phone] or email us at [email or web address]         Additional Information About Your Visit        MyChart Information     [employer] gives you secure access to your electronic health record. If you see a primary care provider, you can also send messages to your care team and make appointments. If you have questions, please call your primary care clinic.  If you do not have a primary care provider, please call [phone] and they will assist you.      [employer] is an electronic gateway that provides easy, online access to your medical records. With [employer], you can request a clinic appointment, read your test results, renew a prescription or communicate  "with your care team.     To access your existing account, please contact your [facility] or call [phone] for assistance.        Care EveryWhere ID     This is your Care EveryWhere ID. This could be used by other organizations to access your [place] medical records  [id number]        Your Vitals Were     Pulse Height Last Period BMI (Body Mass Index)          83 1.615 m (5' 3.58\") 04/24/2017 (Approximate) 22.61 kg/m2         Blood Pressure from Last 3 Encounters:   10/03/17 103/64   08/22/17 108/62   07/12/17 102/64    Weight from Last 3 Encounters:   10/03/17 59 kg (130 lb)   08/22/17 54.9 kg (121 lb)   07/12/17 51.9 kg (114 lb 6.4 oz)              We Performed the Following     TSH with free T4 reflex        Primary Care Provider    None Specified       No primary provider on file.        Equal Access to Services     MICHELLE [facility] Center[facility]IANNA : Hadii sarah Salamanca, titus vicente, nelly kaalmada maritza, alistair suazo . So [facility] [phone].    ATENCIÓN: Si habla español, tiene a tillman disposición servicios gratuitos de asistencia lingüística. Aparna al [phone].    We comply with applicable federal civil rights laws and [place] laws. We do not discriminate on the basis of race, color, national origin, age, disability, sex, sexual orientation, or gender identity.            Thank you!     Thank you for choosing [facility]  for your care. Our goal is always to provide you with excellent care. Hearing back from our patients is one way we can continue to improve our services. Please take a few minutes to complete the written survey that you may receive in the mail after your visit with us. Thank you!             Your Updated Medication List - Protect others around you: Learn how to safely use, store and throw away your medicines at www.disposemymeds.org.          This list is accurate as of: 10/3/17 12:38 PM.  Always use your " most recent med list.                   Brand Name Dispense Instructions for use Diagnosis    levothyroxine 50 MCG tablet    SYNTHROID/LEVOTHROID     Take 50 mcg by mouth daily        prenatal multivitamin plus iron 27-0.8 MG Tabs per tablet      Take 1 tablet by mouth daily

## 2017-10-04 DIAGNOSIS — E03.9 HYPOTHYROIDISM, UNSPECIFIED TYPE: Primary | ICD-10-CM

## 2017-10-04 RX ORDER — LEVOTHYROXINE SODIUM 50 UG/1
50 TABLET ORAL DAILY
Qty: 30 TABLET | Refills: 3 | Status: SHIPPED | OUTPATIENT
Start: 2017-10-04 | End: 2017-12-01

## 2017-11-03 ENCOUNTER — OFFICE VISIT (OUTPATIENT)
Dept: OBGYN | Facility: CLINIC | Age: 27
End: 2017-11-03
Attending: ADVANCED PRACTICE MIDWIFE
Payer: COMMERCIAL

## 2017-11-03 VITALS
DIASTOLIC BLOOD PRESSURE: 60 MMHG | HEIGHT: 64 IN | BODY MASS INDEX: 23.2 KG/M2 | SYSTOLIC BLOOD PRESSURE: 104 MMHG | HEART RATE: 76 BPM | WEIGHT: 135.9 LBS

## 2017-11-03 DIAGNOSIS — E03.9 HYPOTHYROIDISM, UNSPECIFIED TYPE: ICD-10-CM

## 2017-11-03 DIAGNOSIS — Z34.01 NORMAL FIRST PREGNANCY IN FIRST TRIMESTER: Primary | ICD-10-CM

## 2017-11-03 DIAGNOSIS — Z34.02 ENCOUNTER FOR SUPERVISION OF NORMAL FIRST PREGNANCY IN SECOND TRIMESTER: ICD-10-CM

## 2017-11-03 LAB
DEPRECATED CALCIDIOL+CALCIFEROL SERPL-MC: 34 UG/L (ref 20–75)
ERYTHROCYTE [DISTWIDTH] IN BLOOD BY AUTOMATED COUNT: 12.3 % (ref 10–15)
GLUCOSE 1H P 50 G GLC PO SERPL-MCNC: 173 MG/DL (ref 60–129)
HCT VFR BLD AUTO: 37 % (ref 35–47)
HGB BLD-MCNC: 12.2 G/DL (ref 11.7–15.7)
MCH RBC QN AUTO: 31.4 PG (ref 26.5–33)
MCHC RBC AUTO-ENTMCNC: 33 G/DL (ref 31.5–36.5)
MCV RBC AUTO: 95 FL (ref 78–100)
PLATELET # BLD AUTO: 157 10E9/L (ref 150–450)
RBC # BLD AUTO: 3.88 10E12/L (ref 3.8–5.2)
TSH SERPL DL<=0.005 MIU/L-ACNC: 2.76 MU/L (ref 0.4–4)
WBC # BLD AUTO: 7.6 10E9/L (ref 4–11)

## 2017-11-03 PROCEDURE — 25000128 H RX IP 250 OP 636: Mod: ZF

## 2017-11-03 PROCEDURE — 86780 TREPONEMA PALLIDUM: CPT | Performed by: ADVANCED PRACTICE MIDWIFE

## 2017-11-03 PROCEDURE — 84443 ASSAY THYROID STIM HORMONE: CPT | Performed by: ADVANCED PRACTICE MIDWIFE

## 2017-11-03 PROCEDURE — 36415 COLL VENOUS BLD VENIPUNCTURE: CPT | Performed by: ADVANCED PRACTICE MIDWIFE

## 2017-11-03 PROCEDURE — 90471 IMMUNIZATION ADMIN: CPT

## 2017-11-03 PROCEDURE — 99212 OFFICE O/P EST SF 10 MIN: CPT | Mod: ZF

## 2017-11-03 PROCEDURE — 85027 COMPLETE CBC AUTOMATED: CPT | Performed by: ADVANCED PRACTICE MIDWIFE

## 2017-11-03 PROCEDURE — 90715 TDAP VACCINE 7 YRS/> IM: CPT | Mod: ZF

## 2017-11-03 PROCEDURE — 82950 GLUCOSE TEST: CPT | Performed by: ADVANCED PRACTICE MIDWIFE

## 2017-11-03 PROCEDURE — 82306 VITAMIN D 25 HYDROXY: CPT | Performed by: ADVANCED PRACTICE MIDWIFE

## 2017-11-03 ASSESSMENT — ANXIETY QUESTIONNAIRES
7. FEELING AFRAID AS IF SOMETHING AWFUL MIGHT HAPPEN: NOT AT ALL
GAD7 TOTAL SCORE: 0
1. FEELING NERVOUS, ANXIOUS, OR ON EDGE: NOT AT ALL
2. NOT BEING ABLE TO STOP OR CONTROL WORRYING: NOT AT ALL
6. BECOMING EASILY ANNOYED OR IRRITABLE: NOT AT ALL
3. WORRYING TOO MUCH ABOUT DIFFERENT THINGS: NOT AT ALL
5. BEING SO RESTLESS THAT IT IS HARD TO SIT STILL: NOT AT ALL

## 2017-11-03 ASSESSMENT — PATIENT HEALTH QUESTIONNAIRE - PHQ9
5. POOR APPETITE OR OVEREATING: NOT AT ALL
SUM OF ALL RESPONSES TO PHQ QUESTIONS 1-9: 0

## 2017-11-03 NOTE — MR AVS SNAPSHOT
After Visit Summary   11/3/2017    Evelia Jansen    MRN: [id number]           Patient Information     Date Of Birth          1990        Visit Information        Provider Department      11/3/2017 11:15 AM Nirmala Naqvi APRN CNM [facility]        Today's Diagnoses     Normal first pregnancy in first trimester - [facility] CN    -  1    Encounter for supervision of normal first pregnancy in second trimester        Hypothyroidism, unspecified type           Follow-ups after your visit        Follow-up notes from your care team     Return in about 4 weeks (around 12/1/2017) for Ultra sound and MARINA.      Your next 10 appointments already scheduled     Dec 01, 2017 10:30 AM CST   ULTRASOUND with [facility] ULTRASOUND   [facility] ([facility])    [facility] [address]  [address]  [address]  [facility] [address] [phone]            Dec 01, 2017 11:15 AM CST   RETURN OB with KELSEY Zhong CNM   [facility] ([facility])    [facility] [address]  [address]  [facility] [address] [phone]              Future tests that were ordered for you today     Open Future Orders        Priority Expected Expires Ordered    Growth Ultrasound 47788 Routine  3/3/2018 11/3/2017            Who to contact     Please call your clinic at [phone] to:    Ask questions about your health    Make or cancel appointments    Discuss your medicines    Learn about your test results    Speak to your doctor   If you have compliments or concerns about an experience at your clinic, or if you wish to file a complaint, please contact [facility] Patient Relations at [phone] or email us at [email or web address]         Additional Information About Your Visit        MyChart Information     [employer] gives you secure access to your electronic  "health record. If you see a primary care provider, you can also send messages to your care team and make appointments. If you have questions, please call your primary care clinic.  If you do not have a primary care provider, please call [phone] and they will assist you.      [employer] is an electronic gateway that provides easy, online access to your medical records. With [employer], you can request a clinic appointment, read your test results, renew a prescription or communicate with your care team.     To access your existing account, please contact your [facility] or call [phone] for assistance.        Care EveryWhere ID     This is your Care EveryWhere ID. This could be used by other organizations to access your [place] medical records  [id number]        Your Vitals Were     Pulse Height Last Period BMI (Body Mass Index)          76 1.615 m (5' 3.58\") 04/24/2017 (Approximate) 23.63 kg/m2         Blood Pressure from Last 3 Encounters:   11/03/17 104/60   10/03/17 103/64   08/22/17 108/62    Weight from Last 3 Encounters:   11/03/17 61.6 kg (135 lb 14.4 oz)   10/03/17 59 kg (130 lb)   08/22/17 54.9 kg (121 lb)              We Performed the Following     25- OH-Vitamin D     Anti Treponema     CBC with platelets     Glucose tolerance gest screen 1 hour [FJL8351]     TDAP VACCINE (BOOSTRIX)     TSH [[id number]]        Primary Care Provider    None Specified       No primary provider on file.        Equal Access to Services     MICHELLE MARTÍNEZ : Sylvia Salamanca, titus vicente, nelly kaalalistair rdz . So [facility] [phone].    ATENCIÓN: Si habla español, tiene a tillman disposición servicios gratuitos de asistencia lingüística. Llame al [phone].    We comply with applicable federal civil rights laws and [place] laws. We do not discriminate on the basis of race, color, national origin, age, disability, sex, sexual " orientation, or gender identity.            Thank you!     Thank you for choosing [facility]  for your care. Our goal is always to provide you with excellent care. Hearing back from our patients is one way we can continue to improve our services. Please take a few minutes to complete the written survey that you may receive in the mail after your visit with us. Thank you!             Your Updated Medication List - Protect others around you: Learn how to safely use, store and throw away your medicines at www.disposemymeds.org.          This list is accurate as of: 11/3/17  1:05 PM.  Always use your most recent med list.                   Brand Name Dispense Instructions for use Diagnosis    levothyroxine 50 MCG tablet    SYNTHROID/LEVOTHROID    30 tablet    Take 1 tablet (50 mcg) by mouth daily    Hypothyroidism, unspecified type       prenatal multivitamin plus iron 27-0.8 MG Tabs per tablet      Take 1 tablet by mouth daily

## 2017-11-03 NOTE — PROGRESS NOTES
27 year old, , 27w4d,   The patient presents with the following concerns: None. Pt reports doing very well.   PHQ-9 SCORE 11/3/2017   Total Score 0     Education completed today includes breast feeding, [employer] hand out , contraception, counting movements, signs of pre-term labor, when to present to birthplace, post partum depression, GBS, getting enough iron and labor induction.  Birth preferences reviewed: unknown preference at this time  Labor support:   and Mom coming from [place]    Feeding plans :    Contraception planned:  condoms  The following labs were ordered today:       GCT, CBC w platelets, Vitamin D, Anti-treponema and TSH  Water birth consent form was not given.  Blood type:   ABO   Date Value Ref Range Status   2017 B  Final     RH(D)   Date Value Ref Range Status   2017  Pos  Final     Antibody Screen   Date Value Ref Range Status   2017 Neg  Final   , Rhogam  was not given.  TDAP  Was given.  A/P:  Encounter Diagnoses   Name Primary?     Normal first pregnancy in first trimester - S CNM Yes     Encounter for supervision of normal first pregnancy in second trimester      Hypothyroidism, unspecified type      Orders Placed This Encounter   Procedures     Growth Ultrasound 14781     TDAP VACCINE (BOOSTRIX)     Glucose tolerance gest screen 1 hour [[id number]]     25- OH-Vitamin D     CBC with platelets     Anti Treponema     TSH [[id number]]   Encouraged CBE.   Growth US btw 30-32 weeks, per M recommendation d/t hypothyroidism  Continue scheduled prenatal care  KELSEY ZhongM

## 2017-11-03 NOTE — LETTER
Date:November 7, 2017      Patient was self referred, no letter generated. Do not send.        [facility] Health Information

## 2017-11-03 NOTE — LETTER
11/3/2017       RE: Evelia Jansen  [address]  [address]  [place] [address]     Dear Colleague,    Thank you for referring your patient, Evelia Jansen, to the [facility] at [facility]. Please see a copy of my visit note below.       27 year old, , 27w4d,   The patient presents with the following concerns: None. Pt reports doing very well.   PHQ-9 SCORE 11/3/2017   Total Score 0     Education completed today includes breast feeding, [facility] hand out , contraception, counting movements, signs of pre-term labor, when to present to birthplace, post partum depression, GBS, getting enough iron and labor induction.  Birth preferences reviewed: unknown preference at this time  Labor support:   and Mom coming from [place]    Feeding plans :    Contraception planned:  condoms  The following labs were ordered today:       GCT, CBC w platelets, Vitamin D, Anti-treponema and TSH  Water birth consent form was not given.  Blood type:   ABO   Date Value Ref Range Status   2017 B  Final     RH(D)   Date Value Ref Range Status   2017  Pos  Final     Antibody Screen   Date Value Ref Range Status   2017 Neg  Final   , Rhogam  was not given.  TDAP  Was given.  A/P:  Encounter Diagnoses   Name Primary?     Normal first pregnancy in first trimester - S CNM Yes     Encounter for supervision of normal first pregnancy in second trimester      Hypothyroidism, unspecified type      Orders Placed This Encounter   Procedures     Growth Ultrasound 70021     TDAP VACCINE (BOOSTRIX)     Glucose tolerance gest screen 1 hour [SKC9956]     25- OH-Vitamin D     CBC with platelets     Anti Treponema     TSH [YYI562]   Encouraged CBE.   Growth US btw 30-32 weeks, per MFM recommendation d/t hypothyroidism  Continue scheduled prenatal care  KELSEY Zhong CNM                  Again, thank you for allowing me to participate in the care of your patient.       Sincerely,    KELSEY Zhong CNM

## 2017-11-04 LAB — T PALLIDUM IGG+IGM SER QL: NEGATIVE

## 2017-11-04 ASSESSMENT — ANXIETY QUESTIONNAIRES: GAD7 TOTAL SCORE: 0

## 2017-11-05 DIAGNOSIS — Z34.03 NORMAL FIRST PREGNANCY IN THIRD TRIMESTER: Primary | ICD-10-CM

## 2017-11-05 DIAGNOSIS — O99.810 ABNORMAL O'SULLIVAN GLUCOSE CHALLENGE TEST, ANTEPARTUM: ICD-10-CM

## 2017-11-09 ENCOUNTER — TELEPHONE (OUTPATIENT)
Dept: OBGYN | Facility: CLINIC | Age: 27
End: 2017-11-09

## 2017-11-09 DIAGNOSIS — O99.810 ABNORMAL O'SULLIVAN GLUCOSE CHALLENGE TEST, ANTEPARTUM: ICD-10-CM

## 2017-11-09 DIAGNOSIS — O99.810 ABNORMAL MATERNAL GLUCOSE TOLERANCE, ANTEPARTUM: Primary | ICD-10-CM

## 2017-11-09 LAB
GLUCOSE 1H P 100 G GLC PO SERPL-MCNC: 204 MG/DL (ref 60–179)
GLUCOSE 2H P 100 G GLC PO SERPL-MCNC: 172 MG/DL (ref 60–154)
GLUCOSE 3H P 100 G GLC PO SERPL-MCNC: 119 MG/DL (ref 60–139)
GLUCOSE P FAST SERPL-MCNC: 71 MG/DL (ref 60–94)

## 2017-11-09 PROCEDURE — 82951 GLUCOSE TOLERANCE TEST (GTT): CPT | Performed by: ADVANCED PRACTICE MIDWIFE

## 2017-11-09 PROCEDURE — 36415 COLL VENOUS BLD VENIPUNCTURE: CPT | Performed by: ADVANCED PRACTICE MIDWIFE

## 2017-11-09 PROCEDURE — 82952 GTT-ADDED SAMPLES: CPT | Performed by: ADVANCED PRACTICE MIDWIFE

## 2017-11-09 NOTE — TELEPHONE ENCOUNTER
Diabetic educator referral along with diabetic supplies ordered.  [place] informed and will  supplies and schedule appointment with [facility] Diabetic Educator.Pt indicated understanding and agreed with plan.

## 2017-11-22 ENCOUNTER — OFFICE VISIT (OUTPATIENT)
Dept: EDUCATION SERVICES | Facility: CLINIC | Age: 27
End: 2017-11-22

## 2017-11-22 VITALS — WEIGHT: 137 LBS | BODY MASS INDEX: 23.83 KG/M2

## 2017-11-22 DIAGNOSIS — O99.810 ABNORMAL MATERNAL GLUCOSE TOLERANCE, ANTEPARTUM: ICD-10-CM

## 2017-11-22 RX ORDER — LANCETS
EACH MISCELLANEOUS
Qty: 102 EACH | Refills: 11 | Status: SHIPPED | OUTPATIENT
Start: 2017-11-22 | End: 2018-03-14

## 2017-11-22 NOTE — MR AVS SNAPSHOT
After Visit Summary   11/22/2017    Evelia Jansen    MRN: [id number]           Patient Information     Date Of Birth          1990        Visit Information        Provider Department      11/22/2017 4:30 PM Venita Herrera RN [facility] Diabetes        Today's Diagnoses     GDM          Care Instructions    1. Check blood sugar 4 times a day, before breakfast and 1 hour after the start of each meal.     2. Check urine ketones when you wake up every morning for 7 days. If negative everyday, reduce testing to once a week.    3. Follow the recommended meal plan: eat something every 2-3 hours, include protein/fat and carbohydrate at every meal and snack, have 30 carbs at breakfast, 45-60 carbs at lunch, 45-60 carbs at supper, 15-30 carbs at 3 snacks per day.     4. Add activity to every day, try walking or being active after each meal to help control blood sugar levels.    5.Call or e-mail educator if 3 or more blood sugars are above goal in 1 week. Call or e-mail with questions or concerns.    6. December 29 2:30 pm      Venita [place]rera [place] [address]  Phone: [phone]/ [phone]   [email or web address]                  Follow-ups after your visit        Your next 10 appointments already scheduled     Dec 01, 2017 10:30 AM CST   ULTRASOUND with [facility] ULTRASOUND   [facility] ([facility])    [place] [facility] [address] [facility] [id number] [phone]            Dec 01, 2017 11:15 AM CST   RETURN OB with KELSEY Zhong CNM   [facility] ([facility])    [place] [facility] [address]  [address]  [facility] [address] [phone]              Who to contact     Please call your clinic at [phone] to:    Ask questions about your health    Make or cancel appointments    Discuss your medicines    Learn  about your test results    Speak to your doctor   If you have compliments or concerns about an experience at your clinic, or if you wish to file a complaint, please contact [facility] Patient Relations at [phone] or email us at [email or web address]         Additional Information About Your Visit        GRUZOBZORhart Information     [facility] gives you secure access to your electronic health record. If you see a primary care provider, you can also send messages to your care team and make appointments. If you have questions, please call your primary care clinic.  If you do not have a primary care provider, please call [phone] and they will assist you.      Taodangpu is an electronic gateway that provides easy, online access to your medical records. With Taodangpu, you can request a clinic appointment, read your test results, renew a prescription or communicate with your care team.     To access your existing account, please contact your [facility] or call [phone] for assistance.        Care EveryWhere ID     This is your Care EveryWhere ID. This could be used by other organizations to access your [place] medical records  [id number]        Your Vitals Were     Last Period BMI (Body Mass Index)                04/24/2017 (Approximate) 23.83 kg/m2           Blood Pressure from Last 3 Encounters:   11/03/17 104/60   10/03/17 103/64   08/22/17 108/62    Weight from Last 3 Encounters:   11/22/17 62.1 kg (137 lb)   11/03/17 61.6 kg (135 lb 14.4 oz)   10/03/17 59 kg (130 lb)              We Performed the Following     Diabetes Educator Referral          Today's Medication Changes          These changes are accurate as of: 11/22/17  5:56 PM.  If you have any questions, ask your nurse or doctor.               Start taking these medicines.        Dose/Directions    acetone (Urine) test Strp   Commonly known as:  RELION KETONE TEST   Used for:  Abnormal  maternal glucose tolerance, antepartum        Use with first morning urine daily   Quantity:  25 each   Refills:  11         These medicines have changed or have updated prescriptions.        Dose/Directions    * blood glucose monitoring lancets   This may have changed:  Another medication with the same name was added. Make sure you understand how and when to take each.   Used for:  Abnormal maternal glucose tolerance, antepartum        Test 4 times daily.   Quantity:  100 each   Refills:  4       * blood glucose monitoring lancets   This may have changed:  You were already taking a medication with the same name, and this prescription was added. Make sure you understand how and when to take each.   Used for:  Abnormal maternal glucose tolerance, antepartum        Use to test blood sugar 4 times daily or as directed.   Quantity:  102 each   Refills:  11       * blood glucose monitoring test strip   Commonly known as:  ACCU-CHEK COMPACT DRUM   This may have changed:  Another medication with the same name was added. Make sure you understand how and when to take each.   Used for:  Abnormal maternal glucose tolerance, antepartum        Test 4 times daily.  May dispense whatever brand is covered by patient's insurance.   Quantity:  100 strip   Refills:  4       * blood glucose monitoring test strip   Commonly known as:  ACCU-CHEK GUIDE   This may have changed:  You were already taking a medication with the same name, and this prescription was added. Make sure you understand how and when to take each.   Used for:  Abnormal maternal glucose tolerance, antepartum        Use to test blood sugar 4 times daily or as directed.   Quantity:  150 strip   Refills:  11       * Notice:  This list has 4 medication(s) that are the same as other medications prescribed for you. Read the directions carefully, and ask your doctor or other care provider to review them with you.         Where to get your medicines      These medications were  sent to [place], [place] - [address] [facility]  [address] [facility], [facility] [address]     Phone:  [phone]     acetone (Urine) test Strp    blood glucose monitoring lancets    blood glucose monitoring test strip                Primary Care Provider    None Specified       No primary provider on file.        Equal Access to Services     MICHELLE MARTÍNEZ : Hadii sarah ku hadasho Soomaali, waaxda luqadaha, qaybta kaalmada adeegyada, waxay harika winter. So [facility] [phone].    ATENCIÓN: Si habla español, tiene a tillman disposición servicios gratuitos de asistencia lingüística. Liangame al [phone].    We comply with applicable federal civil rights laws and [place] laws. We do not discriminate on the basis of race, color, national origin, age, disability, sex, sexual orientation, or gender identity.            Thank you!     Thank you for choosing [facility] DIABETES  for your care. Our goal is always to provide you with excellent care. Hearing back from our patients is one way we can continue to improve our services. Please take a few minutes to complete the written survey that you may receive in the mail after your visit with us. Thank you!             Your Updated Medication List - Protect others around you: Learn how to safely use, store and throw away your medicines at www.disposemymeds.org.          This list is accurate as of: 11/22/17  5:56 PM.  Always use your most recent med list.                   Brand Name Dispense Instructions for use Diagnosis    acetone (Urine) test Strp    RELION KETONE TEST    25 each    Use with first morning urine daily    Abnormal maternal glucose tolerance, antepartum       * blood glucose monitoring lancets     100 each    Test 4 times daily.    Abnormal maternal glucose tolerance, antepartum       * blood glucose monitoring lancets     102 each    Use to test blood sugar 4 times daily or as directed.    Abnormal maternal glucose  tolerance, antepartum       blood glucose monitoring meter device kit     1 kit    Test 4 times daily.    Abnormal maternal glucose tolerance, antepartum       * blood glucose monitoring test strip    ACCU-CHEK COMPACT DRUM    100 strip    Test 4 times daily.  May dispense whatever brand is covered by patient's insurance.    Abnormal maternal glucose tolerance, antepartum       * blood glucose monitoring test strip    ACCU-CHEK GUIDE    150 strip    Use to test blood sugar 4 times daily or as directed.    Abnormal maternal glucose tolerance, antepartum       levothyroxine 50 MCG tablet    SYNTHROID/LEVOTHROID    30 tablet    Take 1 tablet (50 mcg) by mouth daily    Hypothyroidism, unspecified type       prenatal multivitamin plus iron 27-0.8 MG Tabs per tablet      Take 1 tablet by mouth daily        * Notice:  This list has 4 medication(s) that are the same as other medications prescribed for you. Read the directions carefully, and ask your doctor or other care provider to review them with you.

## 2017-11-22 NOTE — PATIENT INSTRUCTIONS
1. Check blood sugar 4 times a day, before breakfast and 1 hour after the start of each meal.     2. Check urine ketones when you wake up every morning for 7 days. If negative everyday, reduce testing to once a week.    3. Follow the recommended meal plan: eat something every 2-3 hours, include protein/fat and carbohydrate at every meal and snack, have 30 carbs at breakfast, 45-60 carbs at lunch, 45-60 carbs at supper, 15-30 carbs at 3 snacks per day.     4. Add activity to every day, try walking or being active after each meal to help control blood sugar levels.    5.Call or e-mail educator if 3 or more blood sugars are above goal in 1 week. Call or e-mail with questions or concerns.    6. December 29 2:30 pm      Venita Herrera RN,CDE  [address] [address]  Phone: [phone]/ [phone]   [email or web address]

## 2017-11-22 NOTE — PROGRESS NOTES
Diabetes Self-Management Training - Gestational Diabetes    SUBJECTIVE/OBJECTIVE:  Evelia Jansen presents today for education related to gestational diabetes.  She is accompanied by spouse, J    Patient's gestational diabetes management related comments/concerns: the health of the baby, whether diabetes will stay after the pregancy    Patient's emotional response to diabetes: expresses readiness to learn and concern for health and well-being    LMP 2017 (Approximate)    Pre pregnancy weight: 110#    Weight gain 27 lbs at 30 weeks gestation.    Estimated Date of Delivery: 2018    No results found for: GLC    1 hour OGTT: 173 on 11/3/17 hour OGTT: Fastin; 1 hr: 204; 2 hr: 172, 3 hr: 119 on 17    History   Smoking Status     Never Smoker   Smokeless Tobacco     Never Used       Lifestyle and Health Behaviors:  Physical Activity: less exercise with pregnancy    Nutrition:  Patient eats 3 meals and 3 snacks per day.    Breakfast:  (8:30a) bread or noodles or steamed bun, milk or cereal/ 1 egg, 1 cup milk, 1 slice of whole grain bread, nuts, water   Snack:  apple  Lunch:  (12n) rice, meat, veggies or noodles, water  Snack:  Apple or cookie  Dinner:  (6-7p)  Rice meat veggies  Bedtime Snack:  8 oz milk or a slice of bread or nuts    Other time(s) food is eaten? No     Beverages: Milk 2/day and Water 5/day    Cultural/Voodoo diet restrictions: No     Biggest challenge to healthy eating is:  knowing what to eat    Pre-Celio Vitamin: Yes    Supplements: Yes   Experiencing nausea?  No     Socio/Economic considerations:  Support System: spouse/significant other    Health Beliefs and Attitudes:   Stage of Change: ACTION (Actively working towards change)    ASSESSMENT:  Evelia and her  have researched the meal plan and made changes to her diet already, cutting back on rice and noodles    INTERVENTION:  Patient was instructed on Accu-Chek Guide meter and was able to provide an accurate return demonstration.  Patient's blood glucose reading today was 84 mg/dL.    Educational topics covered today:  GDM diagnosis, pathophysiology, Risks and Complications of GDM, Means of controlling GDM, Using a Blood Glucose Monitor, Blood Glucose Goals, Logging and Interpreting Glucose Results, Ketone Testing, When to Call a Diabetes Educator or OB Provider, Healthy Eating During Pregnancy, Counting Carbohydrates, Meal Planning for GDM, and Physical Activity    Educational materials provided today:   Anoop Understanding Gestational Diabetes  GDM Log Book  Sharps Disposal  Care After Delivery  Accu-Chek Guide meter kit    Pt verbalized understanding of concepts discussed and recommendations provided today.     PLAN:  Check glucose 4 times daily, before breakfast and 1 hour after each meal.     Check Ketones daily for one week, if negative, reduce testing to once a week.     Physical activity recommended: walking as able.    Meal plan: 30 grams  at breakfast, 45-60 grams at lunch, 45-60 grams carbs at supper, 15-30 carbs at 3 snacks a day.  Follow consistent CHO meal plan, eat CHO and protein/fat at all meals/snacks.    Call/e-mail/MyChart message diabetes educator if 3 or more blood sugars are above the goal in 1 week or if ketones are positive.     Call/e-mail/MyChart message with questions/concerns.    FOLLOW-UP:  Follow up with diabetes educator in 1 week. (11/29/17--2:30 pm)    Time Spent: 60 minutes  Encounter type: Individual

## 2017-11-29 ENCOUNTER — OFFICE VISIT (OUTPATIENT)
Dept: EDUCATION SERVICES | Facility: CLINIC | Age: 27
End: 2017-11-29

## 2017-11-29 DIAGNOSIS — O99.810 ABNORMAL MATERNAL GLUCOSE TOLERANCE, ANTEPARTUM: Primary | ICD-10-CM

## 2017-11-29 RX ORDER — LANCETS
EACH MISCELLANEOUS
Qty: 102 EACH | Refills: 11 | Status: SHIPPED | OUTPATIENT
Start: 2017-11-29 | End: 2020-01-10

## 2017-11-29 NOTE — MR AVS SNAPSHOT
After Visit Summary   11/29/2017    Evelia Jansen    MRN: [id number]           Patient Information     Date Of Birth          1990        Visit Information        Provider Department      11/29/2017 2:30 PM Venita Herrera RN [facility]        Care Instructions    1. Check blood sugar 4 times a day, before breakfast and 2 hour after the start of each meal.     2. Check urine ketones when you wake up every morning for 7 days. If negative everyday, reduce testing to once a week.    3. Follow the recommended meal plan: eat something every 2-3 hours, include protein/fat and carbohydrate at every meal and snack, have 30 carbs at breakfast, 45-60 carbs at lunch, 45-60 carbs at supper, 45-60 carbs at 3 snacks per day.     4. Add activity to every day, try walking or being active after each meal to help control blood sugar levels.    5.Call or e-mail educator if 3 or more blood sugars are above goal in 1 week. Call or e-mail with questions or concerns.    Here are some ideas for breakfast or bedtime snack. Pick a carbohydrate from the right and a protein from the left. If you have carbohydrates 30 grams of total carbohydrate and 3-5 grams of fiber that is even better.   Fruits are not listed as they can cause the blood sugar to raise blood sugar quickly and be used up early in the night. Fruits are better at meals, or morning or afternoon snack.     Protein/Fat list (about 14 grams of protein or 2 fat servings) Carbohydrate list (about 30 grams of carbohydrate)   2 slices of cheese English Muffin   2 TBS Peanut butter/[place] butter/Sun butter 10 crackers     cup Cottage cheese 2% 2 pieces of toast   2 oz any cooked meat - less than deck of cards size 1 hamburger or hot dog bun   1.5 oz of soy nuts 1 whole wheat boo   Avocado or guacamole 6 hans cracker squares     cup tuna - can add mayonnaise 1 cup full fat ice cream - no candy or sauce   2 eggs - boiled, poached, fried, scrambled, omelet 30 chips    1 veggie ana (might have carbohydrates also) Greek yogurt - 30 grams of carbohydrate   2 TBS Coconut 2 - 6 inch tortillas corn or flour   12 shrimp - not breaded 2 toaster waffles - no syrup   2-1oz meatballs   bagel   2 Tbs cream cheese - plain, veggie, salmon - no fruit or honey. 6 cups popcorn - unsweetened     cup of nuts Granola bar of 3o grams of carbohydrate   10 olives 1 cup of unsweetened lentils or beans.    Tofu or Temph 4-5oz 1 cup potato salad     You can always add vegetables with dip, salad dressing or salsa also.         Venita Herrera RN,CDE  [facility]  [address] [place] [address]  Phone: [phone]  [email or web address]                  Follow-ups after your visit        Your next 10 appointments already scheduled     Dec 01, 2017 10:30 AM CST   ULTRASOUND with [facility] ULTRASOUND   [facility] ([facility])    [place] [facility] [address]  [address]  [facility] [address] [phone]            Dec 01, 2017 11:15 AM CST   RETURN OB with KELSEY Zhong CNM   [facility] ([facility])    [facility] [address]  [address]  [facility] [address] [phone]              Who to contact     Please call your clinic at [phone] to:    Ask questions about your health    Make or cancel appointments    Discuss your medicines    Learn about your test results    Speak to your doctor   If you have compliments or concerns about an experience at your clinic, or if you wish to file a complaint, please contact [facility] Patient Relations at [phone] or email us at [email or web address]         Additional Information About Your Visit        MyChart Information     MyChart gives you secure access to your electronic health record. If you see a primary care provider, you can also send messages to your care team  and make appointments. If you have questions, please call your primary care clinic.  If you do not have a primary care provider, please call [phone] and they will assist you.      Microstim is an electronic gateway that provides easy, online access to your medical records. With Microstim, you can request a clinic appointment, read your test results, renew a prescription or communicate with your care team.     To access your existing account, please contact your [facility] or call [phone] for assistance.        Care EveryWhere ID     This is your Care EveryWhere ID. This could be used by other organizations to access your [place] medical records  [id number]        Your Vitals Were     Last Period                   04/24/2017 (Approximate)            Blood Pressure from Last 3 Encounters:   11/03/17 104/60   10/03/17 103/64   08/22/17 108/62    Weight from Last 3 Encounters:   11/22/17 62.1 kg (137 lb)   11/03/17 61.6 kg (135 lb 14.4 oz)   10/03/17 59 kg (130 lb)              Today, you had the following     No orders found for display       Primary Care Provider    None Specified       No primary provider on file.        Equal Access to Services     [facility]: Hadii sarah Salamanca, watrangda jules, qacarringtonta kaalmada maritza, alistair suazo . So [facility] [phone].    ATENCIÓN: Si habla español, tiene a tillman disposición servicios gratuitos de asistencia lingüística. Llame al [phone].    We comply with applicable federal civil rights laws and [place] laws. We do not discriminate on the basis of race, color, national origin, age, disability, sex, sexual orientation, or gender identity.            Thank you!     Thank you for choosing [facility] DIABETES  for your care. Our goal is always to provide you with excellent care. Hearing back from our patients is one way we can continue to improve our services. Please take a few minutes to  complete the written survey that you may receive in the mail after your visit with us. Thank you!             Your Updated Medication List - Protect others around you: Learn how to safely use, store and throw away your medicines at www.disposemymeds.org.          This list is accurate as of: 11/29/17  2:56 PM.  Always use your most recent med list.                   Brand Name Dispense Instructions for use Diagnosis    acetone (Urine) test Strp    RELION KETONE TEST    25 each    Use with first morning urine daily    Abnormal maternal glucose tolerance, antepartum       blood glucose monitoring lancets     102 each    Use to test blood sugar 4 times daily or as directed.    Abnormal maternal glucose tolerance, antepartum       blood glucose monitoring test strip    ACCU-CHEK GUIDE    150 strip    Use to test blood sugar 4 times daily or as directed.    Abnormal maternal glucose tolerance, antepartum       levothyroxine 50 MCG tablet    SYNTHROID/LEVOTHROID    30 tablet    Take 1 tablet (50 mcg) by mouth daily    Hypothyroidism, unspecified type       prenatal multivitamin plus iron 27-0.8 MG Tabs per tablet      Take 1 tablet by mouth daily

## 2017-11-29 NOTE — PROGRESS NOTES
Gestational Diabetes Follow-up Visit    SUBJECTIVE/OBJECTIVE:  Evelia Jansen presents today for education and evaluation of glucose control related to gestational diabetes    She is accompanied by spouse    Patient's gestational diabetes management related comments/concerns: the health of the baby    LMP 04/24/2017 (Approximate)    Weight gain 138 pounds    Blood Glucose/Ketone Log:    Date Ketones Fasting Post Breakfast Post Lunch Post Supper   11/22/17     113   11/23/17  79 81 87 88   11/24/17  83 87 105 80   11/25/17  78 96 104 83   11/26/17  73 79 82 81   11/27/17  75 84 89 80   11/28/17  75 78 75 85   11/29/17 negative 80 68               Current gestational diabetes management:    Taking medications for gestational diabetes? Yes    Physical Activity: moderate regular exercise program which includes walking 30 minutes after each meal    Nutrition:  Patient eats 3 meals and 3 snacks per day, is following recommended meal plan and counts carbohydrates in grams.    ASSESSMENT:  Doing well with carb counting, has room to increase carbs in some meals    Health Beliefs and Attitudes:   Stage of Change: action    Educational topics covered today:  What to expect after delivery, Future testing for Type 2 diabetes (2 hour OGTT at 6 week post-partum check-up and annual fasting blood glucose level), Risk of GDM and planning ahead for future pregnancies, Recommended lifestyle interventions for reducing the risk of Type 2 Diabetes, When to Call a Diabetes Educator or OB Provider    Educational Materials provided today:  Anoop Preventing Diabetes    PLAN:  Check glucose 4 times daily.  Check ketones once a week when readings are consistently negative.  Continue with recommended physical activity.  Continue to follow recommended meal plan: 2 carbs at breakfast, 3-4 carbs at lunch, 3-4 carbs at supper, 1-2 carbs at snacks.  Follow consistent CHO meal plan, eat CHO and protein/fat at all meals/snacks.    Call/e-mail/XunLight  message diabetes educator if 3 or more blood sugars are above the goal in 1 week or if ketones are positive.     FOLLOW-UP:  Follow up with diabetes educator in 1 week.    Call/e-mail/MyChart message diabetes educator if 3 or more blood sugars are above the goal in 1 week or if ketones are positive.     Time spent was 30 minutes  Encounter type: Individual    Any diabetes medication dose changes were made via the CDE Protocol and Collaborative Practice Agreement with the patient's referring provider. A copy of this encounter was shared with the provider.

## 2017-11-29 NOTE — PATIENT INSTRUCTIONS
1. Check blood sugar 4 times a day, before breakfast and 2 hour after the start of each meal.     2. Check urine ketones when you wake up every morning for 7 days. If negative everyday, reduce testing to once a week.    3. Follow the recommended meal plan: eat something every 2-3 hours, include protein/fat and carbohydrate at every meal and snack, have 30 carbs at breakfast, 45-60 carbs at lunch, 45-60 carbs at supper, 45-60 carbs at 3 snacks per day.     4. Add activity to every day, try walking or being active after each meal to help control blood sugar levels.    5.Call or e-mail educator if 3 or more blood sugars are above goal in 1 week. Call or e-mail with questions or concerns.    Here are some ideas for breakfast or bedtime snack. Pick a carbohydrate from the right and a protein from the left. If you have carbohydrates 30 grams of total carbohydrate and 3-5 grams of fiber that is even better.   Fruits are not listed as they can cause the blood sugar to raise blood sugar quickly and be used up early in the night. Fruits are better at meals, or morning or afternoon snack.     Protein/Fat list (about 14 grams of protein or 2 fat servings) Carbohydrate list (about 30 grams of carbohydrate)   2 slices of cheese English Muffin   2 TBS Peanut butter/[place] butter/Sun butter 10 crackers     cup Cottage cheese 2% 2 pieces of toast   2 oz any cooked meat - less than deck of cards size 1 hamburger or hot dog bun   1.5 oz of soy nuts 1 whole wheat boo   Avocado or guacamole 6 hans cracker squares     cup tuna - can add mayonnaise 1 cup full fat ice cream - no candy or sauce   2 eggs - boiled, poached, fried, scrambled, omelet 30 chips   1 veggie ana (might have carbohydrates also) Greek yogurt - 30 grams of carbohydrate   2 TBS Coconut 2 - 6 inch tortillas corn or flour   12 shrimp - not breaded 2 toaster waffles - no syrup   2-1oz meatballs   bagel   2 Tbs cream cheese - plain, veggie, salmon - no fruit or honey.  6 cups popcorn - unsweetened     cup of nuts Granola bar of 3o grams of carbohydrate   10 olives 1 cup of unsweetened lentils or beans.    Tofu or Temph 4-5oz 1 cup potato salad     You can always add vegetables with dip, salad dressing or salsa also.         Venita Herrera RN,CDE  [address] [address]  Phone: [phone]  [email or web address]

## 2017-12-01 ENCOUNTER — OFFICE VISIT (OUTPATIENT)
Dept: OBGYN | Facility: CLINIC | Age: 27
End: 2017-12-01
Attending: ADVANCED PRACTICE MIDWIFE
Payer: COMMERCIAL

## 2017-12-01 VITALS
SYSTOLIC BLOOD PRESSURE: 93 MMHG | DIASTOLIC BLOOD PRESSURE: 59 MMHG | HEIGHT: 64 IN | HEART RATE: 68 BPM | BODY MASS INDEX: 23.34 KG/M2 | WEIGHT: 136.7 LBS

## 2017-12-01 DIAGNOSIS — O99.810 ABNORMAL MATERNAL GLUCOSE TOLERANCE, ANTEPARTUM: ICD-10-CM

## 2017-12-01 DIAGNOSIS — Z34.01 NORMAL FIRST PREGNANCY IN FIRST TRIMESTER: ICD-10-CM

## 2017-12-01 DIAGNOSIS — E03.9 HYPOTHYROIDISM, UNSPECIFIED TYPE: ICD-10-CM

## 2017-12-01 DIAGNOSIS — Z34.03 NORMAL FIRST PREGNANCY IN THIRD TRIMESTER: Primary | ICD-10-CM

## 2017-12-01 PROCEDURE — 76816 OB US FOLLOW-UP PER FETUS: CPT | Mod: ZF

## 2017-12-01 PROCEDURE — 99211 OFF/OP EST MAY X REQ PHY/QHP: CPT | Mod: ZF

## 2017-12-01 RX ORDER — LEVOTHYROXINE SODIUM 50 UG/1
50 TABLET ORAL DAILY
Qty: 30 TABLET | Refills: 3 | Status: SHIPPED | OUTPATIENT
Start: 2017-12-01 | End: 2018-03-14

## 2017-12-01 NOTE — PROGRESS NOTES
27 year old female, , presents at 31 4/7 weeks for obstetric ultrasound assessment indicated by hypothyroidism.    Single fetus    Presentation - cephalic    USEGA = 31 5/7 weeks.  EFW = 1,764 grams, 34 % for 31 weeks.      BONILLA = 15.1cm.  FHR = 139bpm     Placenta anterior and grade 1    Comments: Appropriate fetal growth for gestational age.  F/u u/s as clinically indicated.       DEREJE Cantrell MD, FACOG  [facility]  OB/GYN    2017  2:35 PM

## 2017-12-01 NOTE — LETTER
Date:December 4, 2017      Patient was self referred, no letter generated. Do not send.        [facility] Health Information

## 2017-12-01 NOTE — LETTER
Date:December 4, 2017      Patient was self referred, no letter generated. Do not send.        [facility] Health Information

## 2017-12-01 NOTE — LETTER
2017       RE: Evelia Jansen  [address]  [address]  [place] [facility] [address]     Dear Colleague,    Thank you for referring your patient, Evelia Jansen, to the [facility] at [facility]. Please see a copy of my visit note below.    27 year old female, , presents at 31 4/7 weeks for obstetric ultrasound assessment indicated by hypothyroidism.    Single fetus    Presentation - cephalic    USEGA = 31 5/7 weeks.  EFW = 1,764 grams, 34 % for 31 weeks.      BONILLA = 15.1cm.  FHR = 139bpm     Placenta anterior and grade 1    Comments: Appropriate fetal growth for gestational age.  F/u u/s as clinically indicated.       DEREJE Cantrell MD, FACOG  [facility]  OB/GYN    2017  2:35 PM            Again, thank you for allowing me to participate in the care of your patient.      Sincerely,    [facility]

## 2017-12-01 NOTE — PROGRESS NOTES
"Subjective:      27 year old  at 31w4d presentst for a routine prenatal appointment.   No vaginal bleeding or leakage of fluid.  No  contractions. Pos fetal movement.       No HA, visual changes, RUQ or epigastric pain.   Patient concerns: Feeling well overall.  Reviewed EOB labs with patient.  Has been doing QID blood glucoses. ALL are normal.    Two hours after eating - most are in the 80s, highest was 110.  Fasting are usually in the 70s.    Objective:  Vitals:    17 1101   BP: 93/59   Pulse: 68   Weight: 62 kg (136 lb 11.2 oz)   Height: 1.615 m (5' 3.58\")   , see ob flowsheet  Assessment/Plan     Encounter Diagnoses   Name Primary?     GDM      Hypothyroidism, unspecified type      Normal first pregnancy in third trimester, S CNM Yes       Orders Placed This Encounter   Medications     levothyroxine (SYNTHROID/LEVOTHROID) 50 MCG tablet     Sig: Take 1 tablet (50 mcg) by mouth daily     Dispense:  30 tablet     Refill:  3     ABO   Date Value Ref Range Status   2017 B  Final     RH(D)   Date Value Ref Range Status   2017  Pos  Final     Antibody Screen   Date Value Ref Range Status   2017 Neg  Final   , Rhogam  was not given.    - Reviewed total weight gain, encouraged continued healthy diet and exercise.  .  Reviewed importance of daily fetal kick count and why/how to contact provider.    - Reviewed why/how to contact provider if headache/visual changes/RUQ or epigastric pain, decreased fetal movement, vaginal bleeding, leakage of fluid or more than 4 contractions in an hour.     Patient education/orders or handouts today:  PTL signs/symptoms  Reviewed GBS screening at 35-36 wks.    Return to clinic in 2 weeks and prn if questions or concerns.     KELSEY ZhongM    "

## 2017-12-01 NOTE — LETTER
"2017       RE: vEelia Jansen  [address]  [address]  [place] [address]     Dear Colleague,    Thank you for referring your patient, Evelia Jansen, to the [facility] at [facility]. Please see a copy of my visit note below.    Subjective:      27 year old  at 31w4d presentst for a routine prenatal appointment.   No vaginal bleeding or leakage of fluid.  No  contractions. Pos fetal movement.       No HA, visual changes, RUQ or epigastric pain.   Patient concerns: Feeling well overall.  Reviewed EOB labs with patient.  Has been doing QID blood glucoses. ALL are normal.    Two hours after eating - most are in the 80s, highest was 110.  Fasting are usually in the 70s.    Objective:  Vitals:    17 1101   BP: 93/59   Pulse: 68   Weight: 62 kg (136 lb 11.2 oz)   Height: 1.615 m (5' 3.58\")   , see ob flowsheet  Assessment/Plan     Encounter Diagnoses   Name Primary?     GDM      Hypothyroidism, unspecified type      Normal first pregnancy in third trimester, [facility] CNM Yes       Orders Placed This Encounter   Medications     levothyroxine (SYNTHROID/LEVOTHROID) 50 MCG tablet     Sig: Take 1 tablet (50 mcg) by mouth daily     Dispense:  30 tablet     Refill:  3     ABO   Date Value Ref Range Status   2017 B  Final     RH(D)   Date Value Ref Range Status   2017  Pos  Final     Antibody Screen   Date Value Ref Range Status   2017 Neg  Final   , Rhogam  was not given.    - Reviewed total weight gain, encouraged continued healthy diet and exercise.  .  Reviewed importance of daily fetal kick count and why/how to contact provider.    - Reviewed why/how to contact provider if headache/visual changes/RUQ or epigastric pain, decreased fetal movement, vaginal bleeding, leakage of fluid or more than 4 contractions in an hour.     Patient education/orders or handouts today:  PTL signs/symptoms  Reviewed GBS screening at 35-36 wks.    Return to clinic in 2 " weeks and prn if questions or concerns.     KELSEY Zhong CNM      Again, thank you for allowing me to participate in the care of your patient.      Sincerely,    KELSEY Zhong CNM

## 2017-12-01 NOTE — MR AVS SNAPSHOT
After Visit Summary   12/1/2017    Evelia Jansen    MRN: [id number]           Patient Information     Date Of Birth          1990        Visit Information        Provider Department      12/1/2017 10:30 AM [facility] ULTRASOUND [facility]        Today's Diagnoses     Hypothyroidism, unspecified type        Normal first pregnancy in first trimester - [facility]           Follow-ups after your visit        Your next 10 appointments already scheduled     Dec 15, 2017 10:30 AM CST   RETURN OB with KELSEY Yeboah [facility]   [facility] ([facility])    [facility]  [address]  [address]  [facility] [id number] [phone]              Who to contact     Please call your clinic at [phone] to:    Ask questions about your health    Make or cancel appointments    Discuss your medicines    Learn about your test results    Speak to your doctor   If you have compliments or concerns about an experience at your clinic, or if you wish to file a complaint, please contact [facility] Patient Relations at [phone] or email us at [email or web address]         Additional Information About Your Visit        MyChart Information     ReviewZAPt gives you secure access to your electronic health record. If you see a primary care provider, you can also send messages to your care team and make appointments. If you have questions, please call your primary care clinic.  If you do not have a primary care provider, please call [phone] and they will assist you.      ReviewZAPt is an electronic gateway that provides easy, online access to your medical records. With [employer], you can request a clinic appointment, read your test results, renew a prescription or communicate with your care team.     To access your existing account, please contact your [facility] or call [phone] for  assistance.        Care EveryWhere ID     This is your Care EveryWhere ID. This could be used by other organizations to access your [place] medical records  [id number]        Your Vitals Were     Last Period                   04/24/2017 (Approximate)            Blood Pressure from Last 3 Encounters:   12/01/17 93/59   11/03/17 104/60   10/03/17 103/64    Weight from Last 3 Encounters:   12/01/17 62 kg (136 lb 11.2 oz)   11/22/17 62.1 kg (137 lb)   11/03/17 61.6 kg (135 lb 14.4 oz)              We Performed the Following     Growth Ultrasound [address]          Where to get your medicines      These medications were sent to [address] [address]     Phone:  [phone]     levothyroxine 50 MCG tablet          Primary Care Provider    None Specified       No primary provider on file.        Equal Access to Services     [facility]: Hadii sarah Salamanca, waaxda jules, qaybta kaalmada maritza, alistair suazo . So [facility] [phone].    ATENCIÓN: Si habla español, tiene a tillman disposición servicios gratuitos de asistencia lingüística. Aparna al [phone].    We comply with applicable federal civil rights laws and [place] laws. We do not discriminate on the basis of race, color, national origin, age, disability, sex, sexual orientation, or gender identity.            Thank you!     Thank you for choosing [facility]  for your care. Our goal is always to provide you with excellent care. Hearing back from our patients is one way we can continue to improve our services. Please take a few minutes to complete the written survey that you may receive in the mail after your visit with us. Thank you!             Your Updated Medication List - Protect others around you: Learn how to safely use, store and throw away your medicines at www.disposemymeds.org.          This list is accurate  as of: 12/1/17  2:35 PM.  Always use your most recent med list.                   Brand Name Dispense Instructions for use Diagnosis    acetone (Urine) test Strp    RELION KETONE TEST    25 each    Use with first morning urine daily    Abnormal maternal glucose tolerance, antepartum       * blood glucose monitoring lancets     102 each    Use to test blood sugar 4 times daily or as directed.    Abnormal maternal glucose tolerance, antepartum       * blood glucose monitoring lancets     102 each    Use to test blood sugar 4 times daily or as directed.    Abnormal maternal glucose tolerance, antepartum       blood glucose monitoring test strip    ACCU-CHEK GUIDE    150 strip    Use to test blood sugar 4 times daily or as directed.    Abnormal maternal glucose tolerance, antepartum       levothyroxine 50 MCG tablet    SYNTHROID/LEVOTHROID    30 tablet    Take 1 tablet (50 mcg) by mouth daily    Hypothyroidism, unspecified type       prenatal multivitamin plus iron 27-0.8 MG Tabs per tablet      Take 1 tablet by mouth daily        * Notice:  This list has 2 medication(s) that are the same as other medications prescribed for you. Read the directions carefully, and ask your doctor or other care provider to review them with you.

## 2017-12-01 NOTE — MR AVS SNAPSHOT
After Visit Summary   12/1/2017    Evelia Jansen    MRN: [id number]           Patient Information     Date Of Birth          1990        Visit Information        Provider Department      12/1/2017 11:15 AM Nirmala Naqvi APRN CNM [facility]        Today's Diagnoses     Normal first pregnancy in third trimester, [facility] CNM    -  1    GDM        Hypothyroidism, unspecified type           Follow-ups after your visit        Follow-up notes from your care team     Return in about 2 weeks (around 12/15/2017) for YUDY WASHINGTON.      Your next 10 appointments already scheduled     Dec 15, 2017 10:30 AM CST   RETURN OB with KELSEY Yeboah CNM   [facility] ([facility])    [facility] [address]  [address]  [address]  [facility] [address] [phone]              Who to contact     Please call your clinic at [phone] to:    Ask questions about your health    Make or cancel appointments    Discuss your medicines    Learn about your test results    Speak to your doctor   If you have compliments or concerns about an experience at your clinic, or if you wish to file a complaint, please contact [facility] Patient Relations at [phone] or email us at [email or web address]         Additional Information About Your Visit        MyChart Information     [employer] gives you secure access to your electronic health record. If you see a primary care provider, you can also send messages to your care team and make appointments. If you have questions, please call your primary care clinic.  If you do not have a primary care provider, please call [phone] and they will assist you.      [employer] is an electronic gateway that provides easy, online access to your medical records. With [employer], you can request a clinic appointment, read your test results, renew a prescription or communicate with your  "care team.     To access your existing account, please contact your [facility] or call [phone] for assistance.        Care EveryWhere ID     This is your Care EveryWhere ID. This could be used by other organizations to access your [place] medical records  [id number]        Your Vitals Were     Pulse Height Last Period BMI (Body Mass Index)          68 1.615 m (5' 3.58\") 04/24/2017 (Approximate) 23.77 kg/m2         Blood Pressure from Last 3 Encounters:   12/01/17 93/59   11/03/17 104/60   10/03/17 103/64    Weight from Last 3 Encounters:   12/01/17 62 kg (136 lb 11.2 oz)   11/22/17 62.1 kg (137 lb)   11/03/17 61.6 kg (135 lb 14.4 oz)              Today, you had the following     No orders found for display         Where to get your medicines      These medications were sent to [address] [address]     Phone:  [phone]     levothyroxine 50 MCG tablet          Primary Care Provider    None Specified       No primary provider on file.        Equal Access to Services     [facility]: Hadii sarah Salamanca, watrangda jules, qacarringtonta kaalmada maritza, alistair suazo . So [facility] [phone].    ATENCIÓN: Si habla español, tiene a tillman disposición servicios gratuitos de asistencia lingüística. Llame al [phone].    We comply with applicable federal civil rights laws and [place] laws. We do not discriminate on the basis of race, color, national origin, age, disability, sex, sexual orientation, or gender identity.            Thank you!     Thank you for choosing [facility]  for your care. Our goal is always to provide you with excellent care. Hearing back from our patients is one way we can continue to improve our services. Please take a few minutes to complete the written survey that you may receive in the mail after your visit with us. " Thank you!             Your Updated Medication List - Protect others around you: Learn how to safely use, store and throw away your medicines at www.disposemymeds.org.          This list is accurate as of: 12/1/17  9:00 PM.  Always use your most recent med list.                   Brand Name Dispense Instructions for use Diagnosis    acetone (Urine) test Strp    RELION KETONE TEST    25 each    Use with first morning urine daily    Abnormal maternal glucose tolerance, antepartum       * blood glucose monitoring lancets     102 each    Use to test blood sugar 4 times daily or as directed.    Abnormal maternal glucose tolerance, antepartum       * blood glucose monitoring lancets     102 each    Use to test blood sugar 4 times daily or as directed.    Abnormal maternal glucose tolerance, antepartum       blood glucose monitoring test strip    ACCU-CHEK GUIDE    150 strip    Use to test blood sugar 4 times daily or as directed.    Abnormal maternal glucose tolerance, antepartum       levothyroxine 50 MCG tablet    SYNTHROID/LEVOTHROID    30 tablet    Take 1 tablet (50 mcg) by mouth daily    Hypothyroidism, unspecified type       prenatal multivitamin plus iron 27-0.8 MG Tabs per tablet      Take 1 tablet by mouth daily        * Notice:  This list has 2 medication(s) that are the same as other medications prescribed for you. Read the directions carefully, and ask your doctor or other care provider to review them with you.

## 2017-12-07 ENCOUNTER — TELEPHONE (OUTPATIENT)
Dept: EDUCATION SERVICES | Facility: CLINIC | Age: 27
End: 2017-12-07

## 2017-12-07 NOTE — TELEPHONE ENCOUNTER
Gestational Diabetes Follow-up    Subjective/Objective:    Evelia Jansen sent in blood glucose log for review. Last date of communication was: 11/29/17.    Gestational diabetes is being managed with diet and activity    Estimated Date of Delivery: Jan 29, 2018    BG/Food Log:       Assessment:    Ketones:negative   Fasting blood glucoses: 100% in target.  After breakfast:100% in target.  After lunch: 100% in target.  After dinner: 100% in target.    Plan/Response:  Meal Plan Recommendation: 2 carbs at breakfast, 3-4 carbs at lunch, 3-4 carbs at supper, 1-2 carbs at each of 3 snacks between meals  Exercise / activity plan: activity as able  Continue to check BG 4 times daily (fasting and two hour(s) after each meal).  Reduce ketone checks to once a week.  Follow-up in 2 weeks.    Mohit Altamirano,  Thanks for sending this over!  You are doing a great job!  You can decrease the ketone testing to once a week and send numbers in two weeks.  Send them sooner if you have 3 abnormal blood sugars in a week.  Thanks!  Venita    Any diabetes medication dose changes were made via the CDE Protocol and Collaborative Practice Agreement with the patient's referring provider. A copy of this encounter was shared with the provider.

## 2017-12-15 ENCOUNTER — OFFICE VISIT (OUTPATIENT)
Dept: OBGYN | Facility: CLINIC | Age: 27
End: 2017-12-15
Attending: ADVANCED PRACTICE MIDWIFE
Payer: COMMERCIAL

## 2017-12-15 VITALS — DIASTOLIC BLOOD PRESSURE: 67 MMHG | WEIGHT: 137.5 LBS | BODY MASS INDEX: 23.91 KG/M2 | SYSTOLIC BLOOD PRESSURE: 98 MMHG

## 2017-12-15 DIAGNOSIS — Z34.03 ENCOUNTER FOR SUPERVISION OF NORMAL FIRST PREGNANCY IN THIRD TRIMESTER: ICD-10-CM

## 2017-12-15 DIAGNOSIS — O09.93 HRP (HIGH RISK PREGNANCY), THIRD TRIMESTER: Primary | ICD-10-CM

## 2017-12-15 PROCEDURE — 99211 OFF/OP EST MAY X REQ PHY/QHP: CPT | Mod: ZF

## 2017-12-15 NOTE — PROGRESS NOTES
Subjective:      27 year old  at 33w4d presents for a routine prenatal appointment.         No vaginal bleeding,  leakage of fluid, or change in vaginal discharge.  No contractions.  Increased fetal movement since last visit.     No HA, visual changes.   Patient concerns: Patient has GDM and checks her blood glucose four times a day. She checks a fasting in the morning, and then 2 hours after each meal. She is wondering if she can decrease the amount of blood glucose checks. Her BG ranges since last visit have been . Also wonders what she should be packing for the hospital.  Feeling well overall.   Objective:  Vitals:    12/15/17 1100   BP: 98/67   Weight: 62.4 kg (137 lb 8 oz)    See OB flowsheet  General: no acute distress, pleasant conversation   Resp: breathing comfortably  FHT: 144 bpm   FH: 33 cm     Assessment/Plan     Encounter Diagnoses   Name Primary?     HRP (high risk pregnancy), third trimester Yes     Encounter for supervision of normal first pregnancy in third trimester      No orders of the defined types were placed in this encounter.    No orders of the defined types were placed in this encounter.      PHQ-9 SCORE 11/3/2017   Total Score 0     [unfilled]  Birth preferences reviewed: Un-Medicated  Labor signs discussed. Reinforced daily fetal movement counts.  Reviewed why/how to contact provider if headache/visual changes/RUQ or epigastric pain, decreased fetal movement, vaginal bleeding, leakage of fluid.  Educated patient on what to pack for hospital and what to leave at home.   Patient will check blood glucose twice a week: once in the morning fasting, once 2 hours after eating--alternating the times each day she checks.   Patient education on preventing falls this winter/slippery season and to call if she does fall.    Return to clinic in 3 weeks and prn if questions or concerns.     Kemal BROWN, am serving as a scribe; to document services personally performed by  Nicol Ortiz  KELSEY WASHINGTON based on data collection and the provider's statements to me.     Kemal Figueroa MS4    The encounter was performed by me and scribed by the medical student. The scribed note accurately reflects my personal services and decisions made by me. KELSEY Yeboah CNM

## 2017-12-15 NOTE — MR AVS SNAPSHOT
After Visit Summary   12/15/2017    Evelia Jansen    MRN: [id number]           Patient Information     Date Of Birth          1990        Visit Information        Provider Department      12/15/2017 10:30 AM Nicol Ortiz APRN CNM [facility]        Today's Diagnoses     HRP (high risk pregnancy), third trimester    -  1    Encounter for supervision of normal first pregnancy in third trimester           Follow-ups after your visit        Follow-up notes from your care team     Return in about 3 weeks (around 1/5/2018) for MARINA.      Your next 10 appointments already scheduled     Jan 05, 2018 10:30 AM CST   RETURN OB with KELSEY Johnson CNM   [facility] ([facility])    [facility] [address]  [address]  [facility] [address] [phone]              Who to contact     Please call your clinic at [phone] to:    Ask questions about your health    Make or cancel appointments    Discuss your medicines    Learn about your test results    Speak to your doctor   If you have compliments or concerns about an experience at your clinic, or if you wish to file a complaint, please contact [facility] Patient Relations at [phone] or email us at [email or web address]         Additional Information About Your Visit        MyChart Information     [employer] gives you secure access to your electronic health record. If you see a primary care provider, you can also send messages to your care team and make appointments. If you have questions, please call your primary care clinic.  If you do not have a primary care provider, please call [phone] and they will assist you.      [employer] is an electronic gateway that provides easy, online access to your medical records. With [employer], you can request a clinic appointment, read your test results, renew a prescription or communicate with  your care team.     To access your existing account, please contact your [facility] or call [phone] for assistance.        Care EveryWhere ID     This is your Care EveryWhere ID. This could be used by other organizations to access your [facility] medical records  [id number]        Your Vitals Were     Last Period BMI (Body Mass Index)                04/24/2017 (Approximate) 23.91 kg/m2           Blood Pressure from Last 3 Encounters:   12/15/17 98/67   12/01/17 93/59   11/03/17 104/60    Weight from Last 3 Encounters:   12/15/17 62.4 kg (137 lb 8 oz)   12/01/17 62 kg (136 lb 11.2 oz)   11/22/17 62.1 kg (137 lb)              Today, you had the following     No orders found for display       Primary Care Provider    None Specified       No primary provider on file.        Equal Access to Services     [facility] : Hadii sarah Salamanca, titus vicente, nelly salas, alistair suazo . So [facility] [phone].    ATENCIÓN: Si habla español, tiene a tillman disposición servicios gratuitos de asistencia lingüística. Llame al [phone].    We comply with applicable federal civil rights laws and [place] laws. We do not discriminate on the basis of race, color, national origin, age, disability, sex, sexual orientation, or gender identity.            Thank you!     Thank you for choosing [facility]  for your care. Our goal is always to provide you with excellent care. Hearing back from our patients is one way we can continue to improve our services. Please take a few minutes to complete the written survey that you may receive in the mail after your visit with us. Thank you!             Your Updated Medication List - Protect others around you: Learn how to safely use, store and throw away your medicines at www.disposemymeds.org.          This list is accurate as of: 12/15/17 12:53 PM.  Always use your most recent med list.                    Brand Name Dispense Instructions for use Diagnosis    acetone (Urine) test Strp    RELION KETONE TEST    25 each    Use with first morning urine daily    Abnormal maternal glucose tolerance, antepartum       * blood glucose monitoring lancets     102 each    Use to test blood sugar 4 times daily or as directed.    Abnormal maternal glucose tolerance, antepartum       * blood glucose monitoring lancets     102 each    Use to test blood sugar 4 times daily or as directed.    Abnormal maternal glucose tolerance, antepartum       blood glucose monitoring test strip    ACCU-CHEK GUIDE    150 strip    Use to test blood sugar 4 times daily or as directed.    Abnormal maternal glucose tolerance, antepartum       levothyroxine 50 MCG tablet    SYNTHROID/LEVOTHROID    30 tablet    Take 1 tablet (50 mcg) by mouth daily    Hypothyroidism, unspecified type       prenatal multivitamin plus iron 27-0.8 MG Tabs per tablet      Take 1 tablet by mouth daily        * Notice:  This list has 2 medication(s) that are the same as other medications prescribed for you. Read the directions carefully, and ask your doctor or other care provider to review them with you.

## 2017-12-15 NOTE — LETTER
Date:December 18, 2017      Patient was self referred, no letter generated. Do not send.        [facility] Health Information

## 2017-12-15 NOTE — LETTER
12/15/2017       RE: Evelia Jansen  [address]  [address]  [facility] [address]     Dear Colleague,    Thank you for referring your patient, Evelia Jansen, to the [facility] at [facility]. Please see a copy of my visit note below.    Subjective:      27 year old  at 33w4d presents for a routine prenatal appointment.         No vaginal bleeding,  leakage of fluid, or change in vaginal discharge.  No contractions.  Increased fetal movement since last visit.     No HA, visual changes.   Patient concerns: Patient has GDM and checks her blood glucose four times a day. She checks a fasting in the morning, and then 2 hours after each meal. She is wondering if she can decrease the amount of blood glucose checks. Her BG ranges since last visit have been . Also wonders what she should be packing for the hospital.  Feeling well overall.   Objective:  Vitals:    12/15/17 1100   BP: 98/67   Weight: 62.4 kg (137 lb 8 oz)    See OB flowsheet  General: no acute distress, pleasant conversation   Resp: breathing comfortably  FHT: 144 bpm   FH: 33 cm     Assessment/Plan     Encounter Diagnoses   Name Primary?     HRP (high risk pregnancy), third trimester Yes     Encounter for supervision of normal first pregnancy in third trimester      No orders of the defined types were placed in this encounter.    No orders of the defined types were placed in this encounter.      PHQ-9 SCORE 11/3/2017   Total Score 0     [unfilled]  Birth preferences reviewed: Un-Medicated  Labor signs discussed. Reinforced daily fetal movement counts.  Reviewed why/how to contact provider if headache/visual changes/RUQ or epigastric pain, decreased fetal movement, vaginal bleeding, leakage of fluid.  Educated patient on what to pack for hospital and what to leave at home.   Patient will check blood glucose twice a week: once in the morning fasting, once 2 hours after eating--alternating the times each  day she checks.   Patient education on preventing falls this winter/slippery season and to call if she does fall.    Return to clinic in 3 weeks and prn if questions or concerns.     I, Kemal Figueroa, am serving as a scribe; to document services personally performed by  Nicol COLE CNM based on data collection and the provider's statements to me.     Kemal Figueroa MS4    The encounter was performed by me and scribed by the medical student. The scribed note accurately reflects my personal services and decisions made by me. KELSEY Yeboah CNM        Again, thank you for allowing me to participate in the care of your patient.      Sincerely,    KELSEY Yeboah CNM

## 2018-01-05 ENCOUNTER — OFFICE VISIT (OUTPATIENT)
Dept: OBGYN | Facility: CLINIC | Age: 28
End: 2018-01-05
Attending: MIDWIFE
Payer: COMMERCIAL

## 2018-01-05 VITALS
SYSTOLIC BLOOD PRESSURE: 105 MMHG | BODY MASS INDEX: 24 KG/M2 | DIASTOLIC BLOOD PRESSURE: 68 MMHG | HEART RATE: 74 BPM | WEIGHT: 138 LBS

## 2018-01-05 DIAGNOSIS — E03.9 HYPOTHYROIDISM, UNSPECIFIED TYPE: ICD-10-CM

## 2018-01-05 PROBLEM — Z34.03 NORMAL FIRST PREGNANCY IN THIRD TRIMESTER: Status: ACTIVE | Noted: 2017-06-29

## 2018-01-05 ASSESSMENT — ANXIETY QUESTIONNAIRES
6. BECOMING EASILY ANNOYED OR IRRITABLE: NOT AT ALL
2. NOT BEING ABLE TO STOP OR CONTROL WORRYING: NOT AT ALL
IF YOU CHECKED OFF ANY PROBLEMS ON THIS QUESTIONNAIRE, HOW DIFFICULT HAVE THESE PROBLEMS MADE IT FOR YOU TO DO YOUR WORK, TAKE CARE OF THINGS AT HOME, OR GET ALONG WITH OTHER PEOPLE: NOT DIFFICULT AT ALL
GAD7 TOTAL SCORE: 0
7. FEELING AFRAID AS IF SOMETHING AWFUL MIGHT HAPPEN: NOT AT ALL
5. BEING SO RESTLESS THAT IT IS HARD TO SIT STILL: NOT AT ALL
3. WORRYING TOO MUCH ABOUT DIFFERENT THINGS: NOT AT ALL
1. FEELING NERVOUS, ANXIOUS, OR ON EDGE: NOT AT ALL

## 2018-01-05 ASSESSMENT — PATIENT HEALTH QUESTIONNAIRE - PHQ9
5. POOR APPETITE OR OVEREATING: NOT AT ALL
SUM OF ALL RESPONSES TO PHQ QUESTIONS 1-9: 0

## 2018-01-05 ASSESSMENT — PAIN SCALES - GENERAL: PAINLEVEL: NO PAIN (0)

## 2018-01-05 NOTE — LETTER
2018       RE: Evelia Jansen  [address]  [address]  [place] [address]     Dear Colleague,    Thank you for referring your patient, Evelia Jansen, to the [facility] at [facility]. Please see a copy of my visit note below.    Subjective:      27 year old  at 36w4d presents for a routine prenatal appointment.         no vaginal bleeding,  leakage of fluid, or change in vaginal discharge.  occ contractions.  goodactive  fetal movement.     No HA, visual changes, RUQ or epigastric pain.   Patient concerns: why HRP?  Explained dx GDM and hypothyroid complications    Feeling well overall.   MOm here from [place] for 5 months   REviewed BS testing BID all FBS < 95  2 h PP all < 120 diet controlled.    Objective:  Vitals:    18 1029   BP: 105/68   Pulse: 74   Weight: 62.6 kg (138 lb)    See OB flowsheet    Assessment/Plan    High risk pregnancy 3rd trim  GDM diet controlled, hypothyroid          PHQ-9 SCORE 11/3/2017 2018   Total Score 0 0     [unfilled]  GBS screening: Not indicated - present in urine. Plan prophylaxis in labor.  Birth preferences reviewed: Medicated  Labor signs discussed. Reinforced daily fetal movement counts.  Reviewed why/how to contact provider if headache/visual changes/RUQ or epigastric pain, decreased fetal movement, vaginal bleeding, leakage of fluid.   Return to clinic in 1 week and prn if questions or concerns.   Declined labs today  TSH checked at 27 wks  Feeling well     KELSEY Johnson CNM    Again, thank you for allowing me to participate in the care of your patient.      Sincerely,    KELSEY Johnson CNM

## 2018-01-05 NOTE — LETTER
Date:January 9, 2018      Patient was self referred, no letter generated. Do not send.        [facility] Health Information

## 2018-01-05 NOTE — MR AVS SNAPSHOT
After Visit Summary   1/5/2018    Evelia Jansen    MRN: [id number]           Patient Information     Date Of Birth          1990        Visit Information        Provider Department      1/5/2018 10:30 AM Radha Hurtado APRN CNM [facility]        Today's Diagnoses     Hypothyroidism, unspecified type           Follow-ups after your visit        Follow-up notes from your care team     Return in about 1 week (around 1/12/2018) for MARINA.      Your next 10 appointments already scheduled     Wilfrido 10, 2018 10:30 AM CST   RETURN OB with KELSEY Zhong CNM   [facility] ([facility])    [facility] [address]  [address]  [facility] [address] [phone]              Who to contact     Please call your clinic at [phone] to:    Ask questions about your health    Make or cancel appointments    Discuss your medicines    Learn about your test results    Speak to your doctor   If you have compliments or concerns about an experience at your clinic, or if you wish to file a complaint, please contact [facility] Patient Relations at [phone] or email us at [email or web address]         Additional Information About Your Visit        MyChart Information     [employer] gives you secure access to your electronic health record. If you see a primary care provider, you can also send messages to your care team and make appointments. If you have questions, please call your primary care clinic.  If you do not have a primary care provider, please call [phone] and they will assist you.      MiS[employer]o is an electronic gateway that provides easy, online access to your medical records. With [employer], you can request a clinic appointment, read your test results, renew a prescription or communicate with your care team.     To access your existing account, please contact your [facility]  [place] [facility] or call [phone] for assistance.        Care EveryWhere ID     This is your Care EveryWhere ID. This could be used by other organizations to access your [place] medical records  [id number]        Your Vitals Were     Pulse Last Period BMI (Body Mass Index)             74 04/24/2017 (Approximate) 24 kg/m2          Blood Pressure from Last 3 Encounters:   01/05/18 105/68   12/15/17 98/67   12/01/17 93/59    Weight from Last 3 Encounters:   01/05/18 62.6 kg (138 lb)   12/15/17 62.4 kg (137 lb 8 oz)   12/01/17 62 kg (136 lb 11.2 oz)              Today, you had the following     No orders found for display       Primary Care Provider    None Specified       No primary provider on file.        Equal Access to Services     MICHELLE MARTÍNEZ : Sylvia Salamanca, titus vicente, nelly kaaljacob salas, alistair suazo . So [facility] [phone].    ATENCIÓN: Si habla español, tiene a tillman disposición servicios gratuitos de asistencia lingüística. Llame al [phone].    We comply with applicable federal civil rights laws and [place] laws. We do not discriminate on the basis of race, color, national origin, age, disability, sex, sexual orientation, or gender identity.            Thank you!     Thank you for choosing [facility]  for your care. Our goal is always to provide you with excellent care. Hearing back from our patients is one way we can continue to improve our services. Please take a few minutes to complete the written survey that you may receive in the mail after your visit with us. Thank you!             Your Updated Medication List - Protect others around you: Learn how to safely use, store and throw away your medicines at www.disposemymeds.org.          This list is accurate as of: 1/5/18 12:58 PM.  Always use your most recent med list.                   Brand Name Dispense Instructions for use Diagnosis    acetone (Urine)  test Strp    RELION KETONE TEST    25 each    Use with first morning urine daily    Abnormal maternal glucose tolerance, antepartum       * blood glucose monitoring lancets     102 each    Use to test blood sugar 4 times daily or as directed.    Abnormal maternal glucose tolerance, antepartum       * blood glucose monitoring lancets     102 each    Use to test blood sugar 4 times daily or as directed.    Abnormal maternal glucose tolerance, antepartum       blood glucose monitoring test strip    ACCU-CHEK GUIDE    150 strip    Use to test blood sugar 4 times daily or as directed.    Abnormal maternal glucose tolerance, antepartum       levothyroxine 50 MCG tablet    SYNTHROID/LEVOTHROID    30 tablet    Take 1 tablet (50 mcg) by mouth daily    Hypothyroidism, unspecified type       prenatal multivitamin plus iron 27-0.8 MG Tabs per tablet      Take 1 tablet by mouth daily        * Notice:  This list has 2 medication(s) that are the same as other medications prescribed for you. Read the directions carefully, and ask your doctor or other care provider to review them with you.

## 2018-01-05 NOTE — PROGRESS NOTES
Subjective:      27 year old  at 36w4d presents for a routine prenatal appointment.         no vaginal bleeding,  leakage of fluid, or change in vaginal discharge.  occ contractions.  goodactive  fetal movement.     No HA, visual changes, RUQ or epigastric pain.   Patient concerns: why HRP?  Explained dx GDM and hypothyroid complications    Feeling well overall.   MOm here from [place] for 5 months   REviewed BS testing BID all FBS < 95  2 h PP all < 120 diet controlled.    Objective:  Vitals:    18 1029   BP: 105/68   Pulse: 74   Weight: 62.6 kg (138 lb)    See OB flowsheet    Assessment/Plan    High risk pregnancy 3rd trim  GDM diet controlled, hypothyroid          PHQ-9 SCORE 11/3/2017 2018   Total Score 0 0     [unfilled]  GBS screening: Not indicated - present in urine. Plan prophylaxis in labor.  Birth preferences reviewed: Medicated  Labor signs discussed. Reinforced daily fetal movement counts.  Reviewed why/how to contact provider if headache/visual changes/RUQ or epigastric pain, decreased fetal movement, vaginal bleeding, leakage of fluid.   Return to clinic in 1 week and prn if questions or concerns.   Declined labs today  TSH checked at 27 wks  Feeling well     EKLSEY Johnson CNM

## 2018-01-05 NOTE — NURSING NOTE
Chief Complaint   Patient presents with     Prenatal Care   36.4 weeks ob visit copy made of blood sugar log

## 2018-01-06 ASSESSMENT — ANXIETY QUESTIONNAIRES: GAD7 TOTAL SCORE: 0

## 2018-01-10 ENCOUNTER — OFFICE VISIT (OUTPATIENT)
Dept: OBGYN | Facility: CLINIC | Age: 28
End: 2018-01-10
Attending: ADVANCED PRACTICE MIDWIFE
Payer: COMMERCIAL

## 2018-01-10 VITALS
BODY MASS INDEX: 23.64 KG/M2 | WEIGHT: 138.5 LBS | HEIGHT: 64 IN | SYSTOLIC BLOOD PRESSURE: 102 MMHG | DIASTOLIC BLOOD PRESSURE: 66 MMHG | HEART RATE: 76 BPM

## 2018-01-10 DIAGNOSIS — Z34.03 NORMAL FIRST PREGNANCY IN THIRD TRIMESTER: Primary | ICD-10-CM

## 2018-01-10 PROCEDURE — G0463 HOSPITAL OUTPT CLINIC VISIT: HCPCS | Mod: ZF

## 2018-01-10 NOTE — LETTER
Date:January 12, 2018      Patient was self referred, no letter generated. Do not send.        [facility] Health Information

## 2018-01-10 NOTE — PROGRESS NOTES
"Subjective:      27 year old  at 37w2d presents for a routine prenatal appointment.      No vaginal bleeding or leakage of fluid.  Feels occasional painless contractions.  Has her blood glucose log- all numbers are well within normal for past week.  Fasting between 74 and 81.  Postprandial are between 81-98.  Pos fetal movement.       No HA, visual changes, RUQ or epigastric pain.     Patient concerns: Wondering if she should be checked today and if baby is engaged.   Also asking for info on CPR classes.   Feeling well overall. Is exercising everyday- You tube videos.     Objective:  Vitals:    01/10/18 1041   BP: 102/66   Pulse: 76   Weight: 62.8 kg (138 lb 8 oz)   Height: 1.615 m (5' 3.58\")    See OB flowsheet  Assessment/Plan     Encounter Diagnosis   Name Primary?     Normal first pregnancy in third trimester, [facility] CNM Yes       - [facility] brochure given for infant CPR classes.   - Discussed cervical checks- Pt wants one next week.   -Will continue on GDM diet and checking  Blood glucoses x2 per day.   - Reviewed why/how to contact provider if headache/visual changes/RUQ or epigastric pain, decreased fetal movement, vaginal bleeding, leakage of fluid or strong/regular contractions.   Patient education/orders or handouts today:  Sign/symptoms of labor and When to call for labor or other concerns  Pt is GBS pos- needs treatment in labor.  Return to clinic in 1 week and prn if questions or concerns.   KELSEY ZhongM    "

## 2018-01-10 NOTE — MR AVS SNAPSHOT
After Visit Summary   1/10/2018    Evelia Jansen    MRN: [id number]           Patient Information     Date Of Birth          1990        Visit Information        Provider Department      1/10/2018 10:30 AM Nirmala Naqvi APRN CNM [facility]        Today's Diagnoses     Normal first pregnancy in third trimester, [facility] CNM    -  1       Follow-ups after your visit        Follow-up notes from your care team     Return in about 1 week (around 1/17/2018) for YUDY WASHINGTON.      Your next 10 appointments already scheduled     Jan 17, 2018 10:30 AM CST   RETURN OB with KELSEY Mejia CNM   [facility] ([facility])    [facility] [address]  [address]  [facility] [address] [phone]              Who to contact     Please call your clinic at [phone] to:    Ask questions about your health    Make or cancel appointments    Discuss your medicines    Learn about your test results    Speak to your doctor   If you have compliments or concerns about an experience at your clinic, or if you wish to file a complaint, please contact [facility] Patient Relations at [phone] or email us at [email or web address]         Additional Information About Your Visit        MyChart Information     [facility] gives you secure access to your electronic health record. If you see a primary care provider, you can also send messages to your care team and make appointments. If you have questions, please call your primary care clinic.  If you do not have a primary care provider, please call [phone] and they will assist you.      [employer] is an electronic gateway that provides easy, online access to your medical records. With [facility] [facility], you can request a clinic appointment, read your test results, renew a prescription or communicate with your care team.     To access your existing account, please  "contact your [facility] or call [phone] for assistance.        Care EveryWhere ID     This is your Care EveryWhere ID. This could be used by other organizations to access your [place] medical records  [id number]        Your Vitals Were     Pulse Height Last Period BMI (Body Mass Index)          76 1.615 m (5' 3.58\") 04/24/2017 (Approximate) 24.09 kg/m2         Blood Pressure from Last 3 Encounters:   01/10/18 102/66   01/05/18 105/68   12/15/17 98/67    Weight from Last 3 Encounters:   01/10/18 62.8 kg (138 lb 8 oz)   01/05/18 62.6 kg (138 lb)   12/15/17 62.4 kg (137 lb 8 oz)              Today, you had the following     No orders found for display       Primary Care Provider    None Specified       No primary provider on file.        Equal Access to Services     [facility]: Hadii sarah Salamanca, titus vicente, nelly kaalmada maritza, alistair suazo . So [facility] [phone].    ATENCIÓN: Si habla español, tiene a tillman disposición servicios gratuitos de asistencia lingüística. Liangame al [phone].    We comply with applicable federal civil rights laws and [place] laws. We do not discriminate on the basis of race, color, national origin, age, disability, sex, sexual orientation, or gender identity.            Thank you!     Thank you for choosing [facility]  for your care. Our goal is always to provide you with excellent care. Hearing back from our patients is one way we can continue to improve our services. Please take a few minutes to complete the written survey that you may receive in the mail after your visit with us. Thank you!             Your Updated Medication List - Protect others around you: Learn how to safely use, store and throw away your medicines at www.disposemymeds.org.          This list is accurate as of: 1/10/18 11:59 PM.  Always use your most recent med list.                   Brand Name " Dispense Instructions for use Diagnosis    acetone (Urine) test Strp    RELION KETONE TEST    25 each    Use with first morning urine daily    Abnormal maternal glucose tolerance, antepartum       * blood glucose monitoring lancets     102 each    Use to test blood sugar 4 times daily or as directed.    Abnormal maternal glucose tolerance, antepartum       * blood glucose monitoring lancets     102 each    Use to test blood sugar 4 times daily or as directed.    Abnormal maternal glucose tolerance, antepartum       blood glucose monitoring test strip    ACCU-CHEK GUIDE    150 strip    Use to test blood sugar 4 times daily or as directed.    Abnormal maternal glucose tolerance, antepartum       levothyroxine 50 MCG tablet    SYNTHROID/LEVOTHROID    30 tablet    Take 1 tablet (50 mcg) by mouth daily    Hypothyroidism, unspecified type       prenatal multivitamin plus iron 27-0.8 MG Tabs per tablet      Take 1 tablet by mouth daily        * Notice:  This list has 2 medication(s) that are the same as other medications prescribed for you. Read the directions carefully, and ask your doctor or other care provider to review them with you.

## 2018-01-10 NOTE — LETTER
"1/10/2018       RE: Evelia Jansen  [address]  [address]  [facility] [address]     Dear Colleague,    Thank you for referring your patient, Evelia Jansen, to the [facility] at [facility]. Please see a copy of my visit note below.    Subjective:      27 year old  at 37w2d presents for a routine prenatal appointment.      No vaginal bleeding or leakage of fluid.  Feels occasional painless contractions.  Has her blood glucose log- all numbers are well within normal for past week.  Fasting between 74 and 81.  Postprandial are between 81-98.  Pos fetal movement.       No HA, visual changes, RUQ or epigastric pain.     Patient concerns: Wondering if she should be checked today and if baby is engaged.   Also asking for info on CPR classes.   Feeling well overall. Is exercising everyday- You tube videos.     Objective:  Vitals:    01/10/18 1041   BP: 102/66   Pulse: 76   Weight: 62.8 kg (138 lb 8 oz)   Height: 1.615 m (5' 3.58\")    See OB flowsheet  Assessment/Plan     Encounter Diagnosis   Name Primary?     Normal first pregnancy in third trimester, [facility] CNM Yes       - [facility] brochure given for infant CPR classes.   - Discussed cervical checks- Pt wants one next week.   -Will continue on GDM diet and checking  Blood glucoses x2 per day.   - Reviewed why/how to contact provider if headache/visual changes/RUQ or epigastric pain, decreased fetal movement, vaginal bleeding, leakage of fluid or strong/regular contractions.   Patient education/orders or handouts today:  Sign/symptoms of labor and When to call for labor or other concerns  Pt is GBS pos- needs treatment in labor.  Return to clinic in 1 week and prn if questions or concerns.   KELSEY Zhong CNM      Again, thank you for allowing me to participate in the care of your patient.      Sincerely,    KELSEY Zhong CNM      "

## 2018-01-13 ENCOUNTER — HOSPITAL ENCOUNTER (INPATIENT)
Facility: CLINIC | Age: 28
LOS: 2 days | Discharge: HOME HEALTH | End: 2018-01-15
Attending: ADVANCED PRACTICE MIDWIFE | Admitting: ADVANCED PRACTICE MIDWIFE
Payer: COMMERCIAL

## 2018-01-13 ENCOUNTER — ANESTHESIA (OUTPATIENT)
Dept: OBGYN | Facility: CLINIC | Age: 28
End: 2018-01-13
Payer: COMMERCIAL

## 2018-01-13 ENCOUNTER — ANESTHESIA EVENT (OUTPATIENT)
Dept: OBGYN | Facility: CLINIC | Age: 28
End: 2018-01-13
Payer: COMMERCIAL

## 2018-01-13 PROBLEM — Z36.89 ENCOUNTER FOR TRIAGE IN PREGNANT PATIENT: Status: ACTIVE | Noted: 2018-01-13

## 2018-01-13 LAB
A1 MICROGLOB PLACENTAL VAG QL: POSITIVE
ABO + RH BLD: NORMAL
ABO + RH BLD: NORMAL
GLUCOSE BLDC GLUCOMTR-MCNC: 119 MG/DL (ref 70–99)
GLUCOSE BLDC GLUCOMTR-MCNC: 61 MG/DL (ref 70–99)
GLUCOSE BLDC GLUCOMTR-MCNC: 70 MG/DL (ref 70–99)
GLUCOSE BLDC GLUCOMTR-MCNC: 74 MG/DL (ref 70–99)
GLUCOSE BLDC GLUCOMTR-MCNC: 75 MG/DL (ref 70–99)
GLUCOSE BLDC GLUCOMTR-MCNC: 77 MG/DL (ref 70–99)
HGB BLD-MCNC: 13.2 G/DL (ref 11.7–15.7)
PLATELET # BLD AUTO: 150 10E9/L (ref 150–450)
SPECIMEN EXP DATE BLD: NORMAL

## 2018-01-13 PROCEDURE — 3E033VJ INTRODUCTION OF OTHER HORMONE INTO PERIPHERAL VEIN, PERCUTANEOUS APPROACH: ICD-10-PCS | Performed by: ADVANCED PRACTICE MIDWIFE

## 2018-01-13 PROCEDURE — 85049 AUTOMATED PLATELET COUNT: CPT | Performed by: ADVANCED PRACTICE MIDWIFE

## 2018-01-13 PROCEDURE — 84112 EVAL AMNIOTIC FLUID PROTEIN: CPT | Performed by: ADVANCED PRACTICE MIDWIFE

## 2018-01-13 PROCEDURE — G0463 HOSPITAL OUTPT CLINIC VISIT: HCPCS

## 2018-01-13 PROCEDURE — 86901 BLOOD TYPING SEROLOGIC RH(D): CPT | Performed by: ADVANCED PRACTICE MIDWIFE

## 2018-01-13 PROCEDURE — 25000128 H RX IP 250 OP 636: Performed by: ANESTHESIOLOGY

## 2018-01-13 PROCEDURE — 00HU33Z INSERTION OF INFUSION DEVICE INTO SPINAL CANAL, PERCUTANEOUS APPROACH: ICD-10-PCS | Performed by: ANESTHESIOLOGY

## 2018-01-13 PROCEDURE — 00000146 ZZHCL STATISTIC GLUCOSE BY METER IP

## 2018-01-13 PROCEDURE — T1013 SIGN LANG/ORAL INTERPRETER: HCPCS | Mod: U3

## 2018-01-13 PROCEDURE — 25000132 ZZH RX MED GY IP 250 OP 250 PS 637

## 2018-01-13 PROCEDURE — 0KQM0ZZ REPAIR PERINEUM MUSCLE, OPEN APPROACH: ICD-10-PCS | Performed by: ADVANCED PRACTICE MIDWIFE

## 2018-01-13 PROCEDURE — 3E0R3BZ INTRODUCTION OF ANESTHETIC AGENT INTO SPINAL CANAL, PERCUTANEOUS APPROACH: ICD-10-PCS | Performed by: ANESTHESIOLOGY

## 2018-01-13 PROCEDURE — 86780 TREPONEMA PALLIDUM: CPT | Performed by: ADVANCED PRACTICE MIDWIFE

## 2018-01-13 PROCEDURE — 72200001 ZZH LABOR CARE VAGINAL DELIVERY SINGLE

## 2018-01-13 PROCEDURE — 25000128 H RX IP 250 OP 636: Performed by: ADVANCED PRACTICE MIDWIFE

## 2018-01-13 PROCEDURE — 86900 BLOOD TYPING SEROLOGIC ABO: CPT | Performed by: ADVANCED PRACTICE MIDWIFE

## 2018-01-13 PROCEDURE — 25000125 ZZHC RX 250: Performed by: ANESTHESIOLOGY

## 2018-01-13 PROCEDURE — 85018 HEMOGLOBIN: CPT | Performed by: ADVANCED PRACTICE MIDWIFE

## 2018-01-13 PROCEDURE — 25000125 ZZHC RX 250

## 2018-01-13 PROCEDURE — 12000030 ZZH R&B OB INTERMEDIATE UMMC

## 2018-01-13 PROCEDURE — 25000132 ZZH RX MED GY IP 250 OP 250 PS 637: Performed by: ADVANCED PRACTICE MIDWIFE

## 2018-01-13 RX ORDER — BUPIVACAINE HYDROCHLORIDE 2.5 MG/ML
INJECTION, SOLUTION EPIDURAL; INFILTRATION; INTRACAUDAL PRN
Status: DISCONTINUED | OUTPATIENT
Start: 2018-01-13 | End: 2018-01-14 | Stop reason: HOSPADM

## 2018-01-13 RX ORDER — NICOTINE POLACRILEX 4 MG
15-30 LOZENGE BUCCAL
Status: DISCONTINUED | OUTPATIENT
Start: 2018-01-13 | End: 2018-01-14

## 2018-01-13 RX ORDER — DEXTROSE MONOHYDRATE 25 G/50ML
25-50 INJECTION, SOLUTION INTRAVENOUS
Status: DISCONTINUED | OUTPATIENT
Start: 2018-01-13 | End: 2018-01-14

## 2018-01-13 RX ORDER — OXYCODONE AND ACETAMINOPHEN 5; 325 MG/1; MG/1
1 TABLET ORAL
Status: DISCONTINUED | OUTPATIENT
Start: 2018-01-13 | End: 2018-01-14

## 2018-01-13 RX ORDER — OXYTOCIN 10 [USP'U]/ML
10 INJECTION, SOLUTION INTRAMUSCULAR; INTRAVENOUS
Status: DISCONTINUED | OUTPATIENT
Start: 2018-01-13 | End: 2018-01-14

## 2018-01-13 RX ORDER — PENICILLIN G POTASSIUM 5000000 [IU]/1
5 INJECTION, POWDER, FOR SOLUTION INTRAMUSCULAR; INTRAVENOUS ONCE
Status: COMPLETED | OUTPATIENT
Start: 2018-01-13 | End: 2018-01-13

## 2018-01-13 RX ORDER — MISOPROSTOL 200 UG/1
TABLET ORAL
Status: COMPLETED
Start: 2018-01-13 | End: 2018-01-13

## 2018-01-13 RX ORDER — OXYTOCIN 10 [USP'U]/ML
INJECTION, SOLUTION INTRAMUSCULAR; INTRAVENOUS
Status: DISCONTINUED
Start: 2018-01-13 | End: 2018-01-14 | Stop reason: WASHOUT

## 2018-01-13 RX ORDER — LIDOCAINE HYDROCHLORIDE 10 MG/ML
INJECTION, SOLUTION EPIDURAL; INFILTRATION; INTRACAUDAL; PERINEURAL
Status: DISCONTINUED
Start: 2018-01-13 | End: 2018-01-14 | Stop reason: WASHOUT

## 2018-01-13 RX ORDER — FENTANYL/BUPIVACAINE/NS/PF 2-1250MCG
PLASTIC BAG, INJECTION (ML) INJECTION
Status: COMPLETED
Start: 2018-01-13 | End: 2018-01-13

## 2018-01-13 RX ORDER — FENTANYL CITRATE 50 UG/ML
50-100 INJECTION, SOLUTION INTRAMUSCULAR; INTRAVENOUS
Status: DISCONTINUED | OUTPATIENT
Start: 2018-01-13 | End: 2018-01-14

## 2018-01-13 RX ORDER — METHYLERGONOVINE MALEATE 0.2 MG/ML
200 INJECTION INTRAVENOUS
Status: DISCONTINUED | OUTPATIENT
Start: 2018-01-13 | End: 2018-01-14

## 2018-01-13 RX ORDER — NALOXONE HYDROCHLORIDE 0.4 MG/ML
.1-.4 INJECTION, SOLUTION INTRAMUSCULAR; INTRAVENOUS; SUBCUTANEOUS
Status: DISCONTINUED | OUTPATIENT
Start: 2018-01-13 | End: 2018-01-14

## 2018-01-13 RX ORDER — ONDANSETRON 2 MG/ML
4 INJECTION INTRAMUSCULAR; INTRAVENOUS EVERY 6 HOURS PRN
Status: DISCONTINUED | OUTPATIENT
Start: 2018-01-13 | End: 2018-01-14

## 2018-01-13 RX ORDER — LIDOCAINE HYDROCHLORIDE 10 MG/ML
INJECTION, SOLUTION INFILTRATION; PERINEURAL PRN
Status: DISCONTINUED | OUTPATIENT
Start: 2018-01-13 | End: 2018-01-14 | Stop reason: HOSPADM

## 2018-01-13 RX ORDER — LIDOCAINE HYDROCHLORIDE AND EPINEPHRINE 15; 5 MG/ML; UG/ML
INJECTION, SOLUTION EPIDURAL PRN
Status: DISCONTINUED | OUTPATIENT
Start: 2018-01-13 | End: 2018-01-14 | Stop reason: HOSPADM

## 2018-01-13 RX ORDER — OXYTOCIN/0.9 % SODIUM CHLORIDE 30/500 ML
100-340 PLASTIC BAG, INJECTION (ML) INTRAVENOUS CONTINUOUS PRN
Status: COMPLETED | OUTPATIENT
Start: 2018-01-13 | End: 2018-01-13

## 2018-01-13 RX ORDER — ACETAMINOPHEN 325 MG/1
650 TABLET ORAL EVERY 4 HOURS PRN
Status: DISCONTINUED | OUTPATIENT
Start: 2018-01-13 | End: 2018-01-14

## 2018-01-13 RX ORDER — CARBOPROST TROMETHAMINE 250 UG/ML
250 INJECTION, SOLUTION INTRAMUSCULAR
Status: DISCONTINUED | OUTPATIENT
Start: 2018-01-13 | End: 2018-01-14

## 2018-01-13 RX ORDER — SODIUM CHLORIDE 9 MG/ML
INJECTION, SOLUTION INTRAVENOUS CONTINUOUS
Status: DISCONTINUED | OUTPATIENT
Start: 2018-01-13 | End: 2018-01-14

## 2018-01-13 RX ORDER — EPHEDRINE SULFATE 50 MG/ML
5 INJECTION, SOLUTION INTRAMUSCULAR; INTRAVENOUS; SUBCUTANEOUS
Status: DISCONTINUED | OUTPATIENT
Start: 2018-01-13 | End: 2018-01-15 | Stop reason: HOSPADM

## 2018-01-13 RX ORDER — MISOPROSTOL 100 UG/1
25 TABLET ORAL
Status: DISCONTINUED | OUTPATIENT
Start: 2018-01-13 | End: 2018-01-14

## 2018-01-13 RX ORDER — NALBUPHINE HYDROCHLORIDE 10 MG/ML
2.5-5 INJECTION, SOLUTION INTRAMUSCULAR; INTRAVENOUS; SUBCUTANEOUS EVERY 6 HOURS PRN
Status: DISCONTINUED | OUTPATIENT
Start: 2018-01-13 | End: 2018-01-15 | Stop reason: HOSPADM

## 2018-01-13 RX ORDER — SODIUM CHLORIDE, SODIUM LACTATE, POTASSIUM CHLORIDE, CALCIUM CHLORIDE 600; 310; 30; 20 MG/100ML; MG/100ML; MG/100ML; MG/100ML
INJECTION, SOLUTION INTRAVENOUS CONTINUOUS
Status: DISCONTINUED | OUTPATIENT
Start: 2018-01-13 | End: 2018-01-14

## 2018-01-13 RX ORDER — OXYTOCIN/0.9 % SODIUM CHLORIDE 30/500 ML
PLASTIC BAG, INJECTION (ML) INTRAVENOUS
Status: COMPLETED
Start: 2018-01-13 | End: 2018-01-13

## 2018-01-13 RX ORDER — IBUPROFEN 800 MG/1
800 TABLET, FILM COATED ORAL
Status: DISCONTINUED | OUTPATIENT
Start: 2018-01-13 | End: 2018-01-14

## 2018-01-13 RX ORDER — EPHEDRINE SULFATE 50 MG/ML
INJECTION, SOLUTION INTRAMUSCULAR; INTRAVENOUS; SUBCUTANEOUS
Status: DISCONTINUED
Start: 2018-01-13 | End: 2018-01-14 | Stop reason: WASHOUT

## 2018-01-13 RX ORDER — NALOXONE HYDROCHLORIDE 0.4 MG/ML
.1-.4 INJECTION, SOLUTION INTRAMUSCULAR; INTRAVENOUS; SUBCUTANEOUS
Status: DISCONTINUED | OUTPATIENT
Start: 2018-01-13 | End: 2018-01-13

## 2018-01-13 RX ADMIN — SODIUM CHLORIDE, POTASSIUM CHLORIDE, SODIUM LACTATE AND CALCIUM CHLORIDE 1000 ML: 600; 310; 30; 20 INJECTION, SOLUTION INTRAVENOUS at 19:46

## 2018-01-13 RX ADMIN — LIDOCAINE HYDROCHLORIDE 5 ML: 10 INJECTION, SOLUTION INFILTRATION; PERINEURAL at 20:49

## 2018-01-13 RX ADMIN — Medication 2.5 MILLION UNITS: at 18:11

## 2018-01-13 RX ADMIN — PENICILLIN G POTASSIUM 5 MILLION UNITS: 5000000 POWDER, FOR SOLUTION INTRAMUSCULAR; INTRAPLEURAL; INTRATHECAL; INTRAVENOUS at 14:12

## 2018-01-13 RX ADMIN — BUPIVACAINE HYDROCHLORIDE 1 ML: 2.5 INJECTION, SOLUTION EPIDURAL; INFILTRATION; INTRACAUDAL at 20:10

## 2018-01-13 RX ADMIN — Medication 25 MCG: at 18:10

## 2018-01-13 RX ADMIN — LIDOCAINE HYDROCHLORIDE 5 ML: 10 INJECTION, SOLUTION INFILTRATION; PERINEURAL at 20:31

## 2018-01-13 RX ADMIN — LIDOCAINE HYDROCHLORIDE 5 ML: 10 INJECTION, SOLUTION INFILTRATION; PERINEURAL at 21:04

## 2018-01-13 RX ADMIN — SODIUM CHLORIDE, POTASSIUM CHLORIDE, SODIUM LACTATE AND CALCIUM CHLORIDE: 600; 310; 30; 20 INJECTION, SOLUTION INTRAVENOUS at 14:11

## 2018-01-13 RX ADMIN — Medication 340 ML/HR: at 23:12

## 2018-01-13 RX ADMIN — Medication 2.5 MILLION UNITS: at 22:04

## 2018-01-13 RX ADMIN — SODIUM CHLORIDE, POTASSIUM CHLORIDE, SODIUM LACTATE AND CALCIUM CHLORIDE: 600; 310; 30; 20 INJECTION, SOLUTION INTRAVENOUS at 20:26

## 2018-01-13 RX ADMIN — OXYTOCIN-SODIUM CHLORIDE 0.9% IV SOLN 30 UNIT/500ML 340 ML/HR: 30-0.9/5 SOLUTION at 23:12

## 2018-01-13 RX ADMIN — MISOPROSTOL 400 MCG: 200 TABLET ORAL at 23:15

## 2018-01-13 RX ADMIN — LIDOCAINE HYDROCHLORIDE,EPINEPHRINE BITARTRATE 3 ML: 15; .005 INJECTION, SOLUTION EPIDURAL; INFILTRATION; INTRACAUDAL; PERINEURAL at 20:16

## 2018-01-13 RX ADMIN — Medication 25 MCG: at 16:08

## 2018-01-13 RX ADMIN — Medication 10 ML/HR: at 20:18

## 2018-01-13 NOTE — PROGRESS NOTES
"Labor progress note    S:  Alt[place]ano has eaten lunch and is resting in bed.  Reports discomfort with ctxs.  Will get up to walk soon.    O:  Blood pressure 119/69, pulse 63, temperature 98.4  F (36.9  C), temperature source Oral, resp. rate 16, height 1.651 m (5' 5\"), weight 63 kg (139 lb), last menstrual period 2017, SpO2 100 %, not currently breastfeeding.  General appearance: uncomfortable with contractions.  Contractions: Every 4-7 minutes. 60-80 seconds duration.  Palpate: mild.  FHT: Baseline 130 with moderate variability. Accelerations are present. Intermittent variable decelerations have been  present.  ROM: clear fluid. Membranes have been ruptured for 6.5 hours.  Pelvic exam: deferred  Pitocin- none,  Antibiotics- PCN    A:  IUP @ 37w5d rupture of membranes with no labor   Fetal Heart rate tracing Category category two  GBS- positive  GDM diet controlled  Patient Active Problem List   Diagnosis     Hypothyroidism, unspecified type     Normal first pregnancy in third trimester, [facility] CNM     GBS bacteriuria     Abnormal 1 hour glucose test     GDM     Encounter for triage in pregnant patient     Labor and delivery, indication for care     P:  Comfort measures prn   Cervical ripening with oral misoprostol  Continuous EFM  Continue to closely monitor for maternal and fetal well being  PO hydration and nutrition as desired  Encourage sleep; encourage movement, ambulation and position changes while awake  Anticipate   Reevaluate in 2-4 hours/PRN     I, TRINIDAD, am serving as a scribe to document services personally performed by CNM based on the provider's statements to me. TRINIDAD Kirk  The encounter was performed by me and scribed by the SNM. The scribed note accurately reflects my personal services and decisions made by me. KELSEY Yeboah CNM    "

## 2018-01-13 NOTE — PROGRESS NOTES
"ADMIT NOTE  =================  37w5d    Evelia Jansen is a 27 year old female with an Patient's last menstrual period was 2017 (approximate). and Estimated Date of Delivery: 2018 [facility] at 1:15 PM with with SROM, at 0930, without labor.     HPI  ================  Patient noticed leaking of fluid around 0930.  Fluid noted to be clear.  Continued to leak and patient began to have occasional ctxs with mild discomfort. To unit for r/o ROM. Amnisure, positive.  Contractions- mild and cramping  Fetal movement- active  ROM- yes, small, clear, amnisure positive  Vaginal bleeding- none  GBS- positive- antibiotics to be started  FOB- is involved, Paige Martinez   Other labor support- her mother, Edith    Total weight gain- 27 lbs  Height- 5' 5\"  BMI- 19  First prenatal visit at 9w 3d, Total visits- 9    PROBLEM LIST  =================  Patient Active Problem List    Diagnosis Date Noted     Encounter for triage in pregnant patient 2018     Priority: Medium     Labor and delivery, indication for care 2018     Priority: Medium     GDM 2017     Priority: Medium     Diet controlled       Abnormal 1 hour glucose test 2017     Priority: Medium     11/3/17: 1 hour glucose = 173    2017: My Chart message sent, orders for 3 hour placed, Message sent to RNs to call pt       GBS bacteriuria 2017     Priority: Medium     17: + GBS in urine at intake. Pt agrees to IV abx in labor.        Hypothyroidism, unspecified type 2017     Priority: Medium     17: Reports she started taking Levothyroxine 50mcg .  17: TSH 1.97, repeat q trimester.  10/3/17: TSH  [1.55 ]  11/3  2.76        Normal first pregnancy in third trimester, [facility] CNM 2017     Priority: Medium     JOHNATHAN 18 by LMP, c/w 1st trimester u/s  17: Will decide on CNM v MD care after 1st trimester screen- CNM patient  Birth preferences: Mother and  for support " "(mother will be coming from [place])    17: first trimester screen wnl, anterior plac. Offer MSAFP at 15-20 weeks [   ]    Level II: bilateral choroid plexus cysts   MFM rec: repeat ultrasound at 30-32 weeks- ordered [ done- normal growth ]    11/3/17: Failed 1 hour, 17: 2 elevated values for GDM diagnosis         HISTORIES  ============  No Known Allergies  Past Medical History:   Diagnosis Date     Hypothyroidism 2017    50mcg levothyroxine     History reviewed. No pertinent surgical history..  No family history on file.  Social History   Substance Use Topics     Smoking status: Never Smoker     Smokeless tobacco: Never Used     Alcohol use No     Obstetric History       T0      L0     SAB0   TAB0   Ectopic0   Multiple0   Live Births0       # Outcome Date GA Lbr Nicho/2nd Weight Sex Delivery Anes PTL Lv   1 Current                    LABS:   ===========  Prenatal Labs:  Rhogam not indicated   Lab Results   Component Value Date    ABO B 2017    RH  Pos 2017    AS Neg 2017    HEPBANG Nonreactive 2017    TREPAB Negative 2017    RUQIGG 116 2017    HGB 12.2 2017     Rubella immune  No results found for: GBS  Other labs:  Results for orders placed or performed during the hospital encounter of 18 (from the past 24 hour(s))   Rupture of membranes by Amnisure   Result Value Ref Range    Amnisure Positive (A) NEG^Negative   Glucose by meter   Result Value Ref Range    Glucose 61 (L) 70 - 99 mg/dL   Glucose by meter   Result Value Ref Range    Glucose 75 70 - 99 mg/dL       ROS  =========  Pt denies significant respiratory, cardiovacular, GI, or muscular/skeletalcomplaints.   Pigment change of shins bilaterally, denies itching, swelling, tenderness, pain or warmth. Reports first appeared several weeks ago.  See RN data base ROS.     PHYSICAL EXAM:  ===============  /69  Pulse 63  Temp 97.9  F (36.6  C) (Oral)  Resp 16  Ht 1.651 m (5' 5\")  Wt " 63 kg (139 lb)  LMP 04/24/2017 (Approximate)  SpO2 100%  BMI 23.13 kg/m2  General appearance: mildly uncomfortable with contractions  GENERAL APPEARANCE: healthy, alert and no distress  RESP: lungs clear to auscultation - no rales, rhonchi or wheezes  BREAST: normal without masses, tenderness or nipple discharge and no palpable axillary masses or adenopathy  CV: regular rates and rhythm, normal S1 S2, no S3 or S4 and no murmur,and no varicosities  ABDOMEN:  soft, nontender, no epigastric pain  SKIN: no suspicious lesions or rashes, though diffuse pigment change of shins bilaterally that is non-edematous and cool to touch  NEURO: Denies headache, blurred vision, other vision changes  PSYCH: mentation appears normal. and affect normal/bright  Legs: Reflexes normal bilaterally     Abdomen: gravid, vertex fetus per Leopold's, non-tender between contractions.   Cephalic presentation confirmed by BSUS  EFW-  7.5 lbs.   CONTACTIONS: mild, every 7-8 minutes  FETAL HEART TONES: continuous EFM- baseline 130 with moderate variability and positive accelerations. No decelerations.  PELVIC EXAM: 1/ 50%/ Mid/ firm/ -2   KINNEY SCORE: 4  BLOODY SHOW: no   ROM:yes, small, clear  FLUID: clear  AMNISURE: positive    ASSESSMENT:  ==============  IUP @ 37w5d admitted with SROM without labor   NST REACTIVE  Fetal Heart Rate - category one  GBS- positive  GDM- diet controlled   Hypothyroidism, on levothyroxine     PLAN:  ===========  Admit - see IP orders  Pain medication options reviewed. Pt is interested in epidural.  Discussed ROM without labor and GBS positive status, and induction versus expectant management.  Discussed risks and benefits of oral misoprostol, and patient consented to cervical ripening with misoprostol.   Prophylactic IV antibiotic for positive GBS status reviewed with pt. Agreeable to PCN.  Oral hydration and regular diet.  Check blood sugar periodically, then every 2 hours once in active labor  Ambulation,  hydration, position changes, birthing ball and tub options to facilitate labor reviewed with pt .  Anticipate   Observation and reevaluate in 1-2 hours prn    I, TRINIDAD, am serving as a scribe to document services personally performed by CNM based on the provider's statements to me.  TRINIDAD Kirk  The encounter was performed by me and scribed by the SNM. The scribed note accurately reflects my personal services and decisions made by me. KELSEY Yeboah CNM

## 2018-01-13 NOTE — PROVIDER NOTIFICATION
01/13/18 1215   Provider Notification   Provider Name/Title Nicol COLE CNM   Method of Notification Electronic Page   Request Evaluate in Person   Notification Reason Patient Arrived   37.5 weeks SROM clear @0930. Amnisure positive. GDM diet controlled. GBS positive in urine. Plan to transfer to room M471.

## 2018-01-13 NOTE — PROVIDER NOTIFICATION
01/13/18 1339   Point of Care Testing   Puncture Site fingertip   Bedside Glucose (mg/dl )  70 mg/dl   Blood Glucose Intervention ordered lunch

## 2018-01-13 NOTE — PROVIDER NOTIFICATION
01/13/18 1230   Point of Care Testing   Puncture Site fingertip   Bedside Glucose (mg/dl )  75 mg/dl   Blood Glucose Intervention no intervention

## 2018-01-13 NOTE — IP AVS SNAPSHOT
[facility]    [address] [facility] [address]    Phone:  [phone]                                       After Visit Summary   1/13/2018    Evelia Jansen    MRN: [id number]           After Visit Summary Signature Page     I have received my discharge instructions, and my questions have been answered. I have discussed any challenges I see with this plan with the nurse or doctor.    ..........................................................................................................................................  Patient/Patient Representative Signature      ..........................................................................................................................................  Patient Representative Print Name and Relationship to Patient    ..................................................               ................................................  Date                                            Time    ..........................................................................................................................................  Reviewed by Signature/Title    ...................................................              ..............................................  Date                                                            Time

## 2018-01-13 NOTE — PLAN OF CARE
Problem: Labor (Cervical Ripen, Induct, Augment) (Adult,Obstetrics,Pediatric)  Goal: Signs and Symptoms of Listed Potential Problems Will be Absent, Minimized or Managed (Labor)  Signs and symptoms of listed potential problems will be absent, minimized or managed by discharge/transition of care (reference Labor (Cervical Ripen, Induct, Augment) (Adult,Obstetrics,Pediatric) CPG).   Outcome: Improving  Pt states is uncomfortable during ctx's but tolerable, ambulating and on birth ball, mother and  very supportive. Ctx's 5-7 minutes apart, baby reactive. First oral  misol given at 1608. Questions answered, cares explained. BG checks 2 hours after meals. Pt and  states they don't need ; they states never used  before.

## 2018-01-13 NOTE — PROVIDER NOTIFICATION
01/13/18 1212   Point of Care Testing   Puncture Site fingertip   Bedside Glucose (mg/dl )  61 mg/dl   Blood Glucose Intervention 4 oz apple juice

## 2018-01-13 NOTE — PROVIDER NOTIFICATION
01/13/18 1150   Skin   Inspection of bony prominences Full   Skin WDL WDL;ex   Skin Color/Characteristics redness blanchable  (bilateral shin area erythema, not warm or painful.)

## 2018-01-13 NOTE — PROVIDER NOTIFICATION
01/13/18 1300   Provider Notification   Provider Name/Title Kathy ABDI   Method of Notification At Bedside   Request Evaluate in Person   Notification Reason SVE

## 2018-01-13 NOTE — IP AVS SNAPSHOT
MRN:[id number]                      After Visit Summary   1/13/2018    Evelia Jansen    MRN: [id number]           Thank you!     Thank you for choosing [facility] for your care. Our goal is always to provide you with excellent care. Hearing back from our patients is one way we can continue to improve our services. Please take a few minutes to complete the written survey that you may receive in the mail after you visit with us. Thank you!        Patient Information     Date Of Birth          1990        Designated Caregiver       Most Recent Value    Caregiver    Will someone help with your care after discharge? yes    Name of designated caregiver Yohan Delcid    Phone number of caregiver see facesheet, same as patient    Caregiver address same as patient      About your hospital stay     You were admitted on:  January 13, 2018 You last received care in the:  [facility]    You were discharged on:  January 15, 2018        Reason for your hospital stay       Maternity care                  Who to Call     For medical emergencies, please call 911.  For non-urgent questions about your medical care, please call your primary care provider or clinic, None          Attending Provider     Provider Specialty    Nicol Ortiz APRN [facility]    Davina Zhao APRN [facility]       Primary Care Provider Fax #    [employer] [phone]      After Care Instructions     Activity       Review discharge instructions            Diet       Resume previous diet            Discharge Instructions - Gestational diabetic patients       Gestational diabetic patients to follow up for fasting blood sugar and 2 hour 75gm glucose load at 6 weeks postpartum.            Discharge Instructions - Postpartum visit       Schedule postpartum visit with your provider and return to clinic in 6 weeks.                  Further instructions from your care team       Postpartum Vaginal Delivery Instructions    Activity        Ask family and friends for help when you need it.    Do not place anything in your vagina for 6 weeks.    You are not restricted on other activities, but take it easy for a few weeks to allow your body to recover from delivery.  You are able to do any activities you feel up to that point.    No driving until you have stopped taking your pain medications (usually two weeks after delivery).     Call your health care provider if you have any of these symptoms:       Increased pain, swelling, redness, or fluid around your stiches from an episiotomy or perineal tear.    A fever above 100.4 F (38 C) with or without chills when placing a thermometer under your tongue.    You soak a sanitary pad with blood within 1 hour, or you see blood clots larger than a golf ball.    Bleeding that lasts more than 6 weeks.    Vaginal discharge that smells bad.    Severe pain, cramping or tenderness in your lower belly area.    A need to urinate more frequently (use the toilet more often), more urgently (use the toilet very quickly), or it burns when you urinate.    Nausea and vomiting.    Redness, swelling or pain around a vein in your leg.    Problems breastfeeding or a red or painful area on your breast.    Chest pain and cough or are gasping for air.    Problems coping with sadness, anxiety, or depression.  If you have any concerns about hurting yourself or the baby, call your provider immediately.     You have questions or concerns after you return home.     Keep your hands clean:  Always wash your hands before touching your perineal area and stitches.  This helps reduce your risk of infection.  If your hands aren't dirty, you may use an alcohol hand-rub to clean your hands. Keep your nails clean and short.        Pending Results     No orders found from 1/11/2018 to 1/14/2018.            Statement of Approval     Ordered          01/15/18 0853  I have reviewed and agree with all the recommendations and orders detailed in this  "document.  EFFECTIVE NOW     Approved and electronically signed by:  Rafael Aguilar APRN CNM             Admission Information     Date & Time Provider Department Dept. Phone    1/13/2018 Jesushayde DavinaKELSEY Marino CNM [facility] [phone]      Your Vitals Were     Blood Pressure Pulse Temperature Respirations Height Weight    97/60 (BP Location: Left arm) 71 98.2  F (36.8  C) (Oral) 16 1.651 m (5' 5\") 63 kg (139 lb)    Last Period Pulse Oximetry BMI (Body Mass Index)             04/24/2017 (Approximate) 98% 23.13 kg/m2         MyChart Information     [employer] gives you secure access to your electronic health record. If you see a primary care provider, you can also send messages to your care team and make appointments. If you have questions, please call your primary care clinic.  If you do not have a primary care provider, please call [phone] and they will assist you.        Care EveryWhere ID     This is your Care EveryWhere ID. This could be used by other organizations to access your [facility] medical records  [id number]        Equal Access to Services     ANAHY MARTÍNEZ : Hadsher Salamanca, titus vicente, alistair stanley. So [facility] [phone].    ATENCIÓN: Si habla español, tiene a tillman disposición servicios gratuitos de asistencia lingüística. Aparna al [phone].    We comply with applicable federal civil rights laws and [place] laws. We do not discriminate on the basis of race, color, national origin, age, disability, sex, sexual orientation, or gender identity.               Review of your medicines      START taking        Dose / Directions    ibuprofen 600 MG tablet   Commonly known as:  ADVIL/MOTRIN        Dose:  600 mg   Take 1 tablet (600 mg) by mouth every 6 hours as needed for moderate pain   Quantity:  90 tablet   Refills:  1       sennosides 8.6 MG tablet   Commonly known as:  SENOKOT        Dose:  1 tablet   Take 1 tablet by " mouth 2 times daily as needed for constipation   Quantity:  120 tablet   Refills:  1         CONTINUE these medicines which have NOT CHANGED        Dose / Directions    acetone (Urine) test Strp   Commonly known as:  RELION KETONE TEST   Used for:  Abnormal maternal glucose tolerance, antepartum        Use with first morning urine daily   Quantity:  25 each   Refills:  11       * blood glucose monitoring lancets   Used for:  Abnormal maternal glucose tolerance, antepartum        Use to test blood sugar 4 times daily or as directed.   Quantity:  102 each   Refills:  11       * blood glucose monitoring lancets   Used for:  Abnormal maternal glucose tolerance, antepartum        Use to test blood sugar 4 times daily or as directed.   Quantity:  102 each   Refills:  11       blood glucose monitoring test strip   Commonly known as:  ACCU-CHEK GUIDE   Used for:  Abnormal maternal glucose tolerance, antepartum        Use to test blood sugar 4 times daily or as directed.   Quantity:  150 strip   Refills:  11       levothyroxine 50 MCG tablet   Commonly known as:  SYNTHROID/LEVOTHROID   Used for:  Hypothyroidism, unspecified type        Dose:  50 mcg   Take 1 tablet (50 mcg) by mouth daily   Quantity:  30 tablet   Refills:  3       prenatal multivitamin plus iron 27-0.8 MG Tabs per tablet        Dose:  1 tablet   Take 1 tablet by mouth daily   Refills:  0       * Notice:  This list has 2 medication(s) that are the same as other medications prescribed for you. Read the directions carefully, and ask your doctor or other care provider to review them with you.         Where to get your medicines      These medications were sent to [facility] [place], [place] - [address]  [address] [facility] [address], [facility] [address]     Phone:  [phone]     ibuprofen 600 MG tablet    sennosides 8.6 MG tablet                Protect others around you: Learn how to safely use, store and throw away your medicines at  www.disposemymeds.org.             Medication List: This is a list of all your medications and when to take them. Check marks below indicate your daily home schedule. Keep this list as a reference.      Medications           Morning Afternoon Evening Bedtime As Needed    acetone (Urine) test Strp   Commonly known as:  RELION KETONE TEST   Use with first morning urine daily                                * blood glucose monitoring lancets   Use to test blood sugar 4 times daily or as directed.                                * blood glucose monitoring lancets   Use to test blood sugar 4 times daily or as directed.                                blood glucose monitoring test strip   Commonly known as:  ACCU-CHEK GUIDE   Use to test blood sugar 4 times daily or as directed.                                ibuprofen 600 MG tablet   Commonly known as:  ADVIL/MOTRIN   Take 1 tablet (600 mg) by mouth every 6 hours as needed for moderate pain   Last time this was given:  600 mg on 1/14/2018  1:47 PM                                levothyroxine 50 MCG tablet   Commonly known as:  SYNTHROID/LEVOTHROID   Take 1 tablet (50 mcg) by mouth daily                                prenatal multivitamin plus iron 27-0.8 MG Tabs per tablet   Take 1 tablet by mouth daily                                sennosides 8.6 MG tablet   Commonly known as:  SENOKOT   Take 1 tablet by mouth 2 times daily as needed for constipation                                * Notice:  This list has 2 medication(s) that are the same as other medications prescribed for you. Read the directions carefully, and ask your doctor or other care provider to review them with you.

## 2018-01-14 LAB
GLUCOSE BLDC GLUCOMTR-MCNC: 103 MG/DL (ref 70–99)
GLUCOSE BLDC GLUCOMTR-MCNC: 115 MG/DL (ref 70–99)
HGB BLD-MCNC: 11.2 G/DL (ref 11.7–15.7)
T PALLIDUM IGG+IGM SER QL: NEGATIVE

## 2018-01-14 PROCEDURE — 00000146 ZZHCL STATISTIC GLUCOSE BY METER IP

## 2018-01-14 PROCEDURE — 25000132 ZZH RX MED GY IP 250 OP 250 PS 637: Performed by: ADVANCED PRACTICE MIDWIFE

## 2018-01-14 PROCEDURE — 36415 COLL VENOUS BLD VENIPUNCTURE: CPT | Performed by: ADVANCED PRACTICE MIDWIFE

## 2018-01-14 PROCEDURE — 12000028 ZZH R&B OB UMMC

## 2018-01-14 PROCEDURE — 85018 HEMOGLOBIN: CPT | Performed by: ADVANCED PRACTICE MIDWIFE

## 2018-01-14 RX ORDER — AMOXICILLIN 250 MG
2 CAPSULE ORAL 2 TIMES DAILY PRN
Status: DISCONTINUED | OUTPATIENT
Start: 2018-01-14 | End: 2018-01-15 | Stop reason: HOSPADM

## 2018-01-14 RX ORDER — ACETAMINOPHEN 325 MG/1
650 TABLET ORAL EVERY 4 HOURS PRN
Status: DISCONTINUED | OUTPATIENT
Start: 2018-01-14 | End: 2018-01-15 | Stop reason: HOSPADM

## 2018-01-14 RX ORDER — OXYTOCIN/0.9 % SODIUM CHLORIDE 30/500 ML
100 PLASTIC BAG, INJECTION (ML) INTRAVENOUS CONTINUOUS
Status: DISCONTINUED | OUTPATIENT
Start: 2018-01-14 | End: 2018-01-15 | Stop reason: HOSPADM

## 2018-01-14 RX ORDER — MISOPROSTOL 200 UG/1
400 TABLET ORAL
Status: DISCONTINUED | OUTPATIENT
Start: 2018-01-14 | End: 2018-01-15 | Stop reason: HOSPADM

## 2018-01-14 RX ORDER — HYDROCORTISONE 2.5 %
CREAM (GRAM) TOPICAL 3 TIMES DAILY PRN
Status: DISCONTINUED | OUTPATIENT
Start: 2018-01-14 | End: 2018-01-15 | Stop reason: HOSPADM

## 2018-01-14 RX ORDER — BISACODYL 10 MG
10 SUPPOSITORY, RECTAL RECTAL DAILY PRN
Status: DISCONTINUED | OUTPATIENT
Start: 2018-01-16 | End: 2018-01-15 | Stop reason: HOSPADM

## 2018-01-14 RX ORDER — SENNOSIDES 8.6 MG
1 TABLET ORAL 2 TIMES DAILY PRN
Qty: 120 TABLET | Refills: 1 | Status: SHIPPED | OUTPATIENT
Start: 2018-01-14 | End: 2018-03-23

## 2018-01-14 RX ORDER — LANOLIN 100 %
OINTMENT (GRAM) TOPICAL
Status: DISCONTINUED | OUTPATIENT
Start: 2018-01-14 | End: 2018-01-15 | Stop reason: HOSPADM

## 2018-01-14 RX ORDER — IBUPROFEN 600 MG/1
600 TABLET, FILM COATED ORAL EVERY 6 HOURS PRN
Status: DISCONTINUED | OUTPATIENT
Start: 2018-01-14 | End: 2018-01-15 | Stop reason: HOSPADM

## 2018-01-14 RX ORDER — OXYTOCIN 10 [USP'U]/ML
10 INJECTION, SOLUTION INTRAMUSCULAR; INTRAVENOUS
Status: DISCONTINUED | OUTPATIENT
Start: 2018-01-14 | End: 2018-01-15 | Stop reason: HOSPADM

## 2018-01-14 RX ORDER — IBUPROFEN 600 MG/1
600 TABLET, FILM COATED ORAL EVERY 6 HOURS PRN
Qty: 90 TABLET | Refills: 1 | Status: SHIPPED | OUTPATIENT
Start: 2018-01-14 | End: 2018-03-23

## 2018-01-14 RX ORDER — AMOXICILLIN 250 MG
1 CAPSULE ORAL 2 TIMES DAILY PRN
Status: DISCONTINUED | OUTPATIENT
Start: 2018-01-14 | End: 2018-01-15 | Stop reason: HOSPADM

## 2018-01-14 RX ORDER — DEXTROSE MONOHYDRATE 25 G/50ML
25-50 INJECTION, SOLUTION INTRAVENOUS
Status: DISCONTINUED | OUTPATIENT
Start: 2018-01-14 | End: 2018-01-15 | Stop reason: HOSPADM

## 2018-01-14 RX ORDER — OXYTOCIN/0.9 % SODIUM CHLORIDE 30/500 ML
340 PLASTIC BAG, INJECTION (ML) INTRAVENOUS CONTINUOUS PRN
Status: DISCONTINUED | OUTPATIENT
Start: 2018-01-14 | End: 2018-01-15 | Stop reason: HOSPADM

## 2018-01-14 RX ORDER — NICOTINE POLACRILEX 4 MG
15-30 LOZENGE BUCCAL
Status: DISCONTINUED | OUTPATIENT
Start: 2018-01-14 | End: 2018-01-15 | Stop reason: HOSPADM

## 2018-01-14 RX ADMIN — IBUPROFEN 800 MG: 800 TABLET ORAL at 00:15

## 2018-01-14 RX ADMIN — IBUPROFEN 600 MG: 600 TABLET ORAL at 13:47

## 2018-01-14 RX ADMIN — SENNOSIDES AND DOCUSATE SODIUM 2 TABLET: 8.6; 5 TABLET ORAL at 07:34

## 2018-01-14 RX ADMIN — IBUPROFEN 600 MG: 600 TABLET ORAL at 07:34

## 2018-01-14 NOTE — PLAN OF CARE
Problem: Patient Care Overview  Goal: Plan of Care/Patient Progress Review  Outcome: Improving  Pt stated increase in pain at approx 2000 and requested epidural. Stated relief after epidural placement and progressed to complete.  of viable Female with Davina Zhao CNM in attendance.  Nursery RN present.  Infant with spontaneous cry, to mothers abdomen, dried and stimulated.  Apgars 9+9 .  Placenta delivered without complication, Pitocin bolused, 2nd degree laceration with repair, mirella cares provided.  Mother and baby in stable condition.

## 2018-01-14 NOTE — PROGRESS NOTES
Patient arrived to [facility] unit via wheelchair at 0140,with belongings, accompanied by spouse/ significant other, with infant in arms. Received report from GABRIELA Oliveira RN and checked bands. Unit and room orientation started. Call light given; no concerns present at this time. Continue with plan of care.

## 2018-01-14 NOTE — PROGRESS NOTES
"Post Partum Note    Evelia Jansen      MRN#: [id number]  Age: 27 year old      YOB: 1990      Post-partum day #1    SIGNIFICANT PROBLEMS:  Patient Active Problem List    Diagnosis Date Noted      (normal spontaneous vaginal delivery) 2018     Priority: Medium     Encounter for triage in pregnant patient 2018     Priority: Medium     Labor and delivery, indication for care 2018     Priority: Medium     GDM 2017     Priority: Medium     Diet controlled       Abnormal 1 hour glucose test 2017     Priority: Medium     11/3/17: 1 hour glucose = 173    2017: My Chart message sent, orders for 3 hour placed, Message sent to RNs to call pt       GBS bacteriuria 2017     Priority: Medium     17: + GBS in urine at intake. Pt agrees to IV abx in labor.        Hypothyroidism, unspecified type 2017     Priority: Medium     17: Reports she started taking Levothyroxine 50mcg .  17: TSH 1.97, repeat q trimester.  10/3/17: TSH  [1.55 ]  11/3  2.76        Normal first pregnancy in third trimester, [facility] CNM 2017     Priority: Medium     JOHNATHAN 18 by LMP, c/w 1st trimester u/s  17: Will decide on CNM v MD care after 1st trimester screen- CNM patient  Birth preferences: Mother and  for support (mother will be coming from [place])    17: first trimester screen wnl, anterior plac. Offer MSAFP at 15-20 weeks [   ]    Level II: bilateral choroid plexus cysts   MFM rec: repeat ultrasound at 30-32 weeks- ordered [ done- normal growth ]    11/3/17: Failed 1 hour, 17: 2 elevated values for GDM diagnosis         INTERVAL HISTORY:  BP 94/61  Pulse 74  Temp 98.1  F (36.7  C) (Oral)  Resp 18  Ht 1.651 m (5' 5\")  Wt 63 kg (139 lb)  LMP 2017 (Approximate)  SpO2 98%  Breastfeeding? Unknown  BMI 23.13 kg/m2    Pt stable, baby is rooming in  Breast feeding status:initiated  Complications since 2 hours post delivery: " None  Patient is tolerating acitivity well Voiding without difficulty, cramping is relieved by Ibuprophen, lochia is decreasing and patient denies clots.  Perineal pain is is relieved by Ibuprophen.  The perineum laceration is well approximated    Normal postpartum exam     Postpartum hemoglobin   Hemoglobin   Date Value Ref Range Status   2018 11.2 (L) 11.7 - 15.7 g/dL Final     Blood type   Lab Results   Component Value Date    ABO B 2018       Lab Results   Component Value Date    RH Pos 2018     Rubella status No results found for: RUBELLAABIGG    ASSESSMENT/PLAN:  Stable Post-partum day #1  Complications:hx of gestational diabetes, plan for 2 hour OGTT at 6 wks postpartum  Plan d/c home tomorrow  RTC 6 weeks  Teaching done: D/C Instructions: Nutrition/Activity, Engorgement Management, Birth Control Options, Warning Signs/When to Call: Excessive Bleeding, Infection, PP Depression, Kegals and Crunches, RTC Clinic for PP Appointment and PNV    Postpartum warning s/s reviewed, including bleeding/clots, fever, mastitis, or depression    Birthcontrol planned:Condoms  Current Discharge Medication List      START taking these medications    Details   sennosides (SENOKOT) 8.6 MG tablet Take 1 tablet by mouth 2 times daily as needed for constipation  Qty: 120 tablet, Refills: 1    Associated Diagnoses:  (normal spontaneous vaginal delivery)      ibuprofen (ADVIL/MOTRIN) 600 MG tablet Take 1 tablet (600 mg) by mouth every 6 hours as needed for moderate pain  Qty: 90 tablet, Refills: 1    Associated Diagnoses:  (normal spontaneous vaginal delivery)         CONTINUE these medications which have NOT CHANGED    Details   levothyroxine (SYNTHROID/LEVOTHROID) 50 MCG tablet Take 1 tablet (50 mcg) by mouth daily  Qty: 30 tablet, Refills: 3    Associated Diagnoses: Hypothyroidism, unspecified type      !! blood glucose monitoring (ACCU-CHEK FASTCLIX) lancets Use to test blood sugar 4 times daily or as  directed.  Qty: 102 each, Refills: 11    Associated Diagnoses: Abnormal maternal glucose tolerance, antepartum      blood glucose monitoring (ACCU-CHEK GUIDE) test strip Use to test blood sugar 4 times daily or as directed.  Qty: 150 strip, Refills: 11    Associated Diagnoses: Abnormal maternal glucose tolerance, antepartum      Prenatal Vit-Fe Fumarate-FA (PRENATAL MULTIVITAMIN  PLUS IRON) 27-0.8 MG TABS per tablet Take 1 tablet by mouth daily      !! blood glucose monitoring (ACCU-CHEK FASTCLIX) lancets Use to test blood sugar 4 times daily or as directed.  Qty: 102 each, Refills: 11    Associated Diagnoses: Abnormal maternal glucose tolerance, antepartum      acetone, Urine, test (RELION KETONE TEST) STRP Use with first morning urine daily  Qty: 25 each, Refills: 11    Associated Diagnoses: Abnormal maternal glucose tolerance, antepartum       !! - Potential duplicate medications found. Please discuss with provider.          KELSEY Yeboah CNM

## 2018-01-14 NOTE — PLAN OF CARE
Problem: Patient Care Overview  Goal: Plan of Care/Patient Progress Review  Outcome: Improving  Data: Evelia Jansen transferred to 7130 via wheelchair at 0140. Baby transferred via parent's arms.  Action: Receiving unit notified of transfer: Yes. Patient and family notified of room change. Report given to NESTOR Hall at 0140. Belongings sent to receiving unit. Accompanied by Registered Nurse. Oriented patient to surroundings. Call light within reach. ID bands double-checked with receiving RN.  Response: Patient tolerated transfer and is stable.

## 2018-01-14 NOTE — L&D DELIVERY NOTE
DELIVERY NOTE:  Evelia Jansen is a 27 year old   at  37w5d presented to labor floor with c/o spontaneous rupture of membranes for clear fluid without labor. Time of rupture: 0930. Pregnancy complicated by GDMA1 with good control. Normal blood sugars in labor. Pt received 2 doses of oral misoprostal and from there progressed in labor without further induction agents. Progressed to complete and +2.  Pushed effectively with CNM coaching. FHR with variable decels with pushing effort with quick recovery to baseline following contractions, moderate variability throughout. Head delivered OA and restituted to LANCE . No nuchal cord. Shoulders easily delivered under maternal effort.  Live female  delivered at 2309 over a perineal laceration under epidural anesthesia.  Spontaneous breath, vigorous cry, well flexed, HR>100. Infant directly to maternal abdomen, skin to skin. Delayed cord clamping for 2 minutes, then clamped x2 and cut by father of the baby. Cord blood obtained for typing. 20 units of pitocin infusing  after baby. Brisk katie bleeding noted prior to delivery of placenta. Buccal misoprostol administered. Intact placenta spontaneously delivered via Mcge[place] at 2315. 3 vessel cord. Fundus firm @ u-2 with moderate rubra after fundal massage.  Vagina, perineum, and rectum inspected. Second degree perineal laceration repaired with a 3-0 vicryl suture on a CT-1 in the usual fashion. Brisk bleeding from laceration initially making visualization of the apex challenging. 3 locking sutures placed to achieve hemostasis prior to completing the repair. Hemostasis noted. Mother and infant stable; continued skin to skin. Good family bonding observed.     Apgars 9/9.  Weight pending      Placenta to pathology: No  Cord gases not indicated    Delivery Summary:  IUP at 37 weeks 5 days gestation delivered on 2018.     delivery of a viable Female infant.  Weight : pending  Apgars of 9 at 1 minute and 9 at 5  minutes.  Labor was induced.  Medications administered  in labor:  Pain Rx Epidural; Antibiotics Yes: PCN and IV antibiotics infused greater than 4 hours  Perineum: 2nd degree  Placenta-mechanism: spontaneous, intact,  with a 3 vessel cord. IV oxytocin was given After delivery of baby  Quantitative Blood Loss was 614  Complications of labor and delivery: Gestational Diabetes diet controlled  Anticipated Discharge Date: 1/15/2018   Birth attendants: KELSEY Gómez, KELSEY Almanzar CNM     Delivery Summary - No Wing  Delivery Summary    Evelia Jansen MRN# [id number]   Age: 27 year old YOB: 1990     Labor Event Times:    Labor Onset Date       Labor Onset Time    Dilation Complete Date    Dilation Complete Time       Start Pushing Date        Start Pushing Time            Labor Length:    1st Stage (hrs/min) 1.00 20.00   2nd Stage (hrs/min) 0.00 51.00   3rd Stage (hrs/min) 0.00 6.00       Labor Events:     Labor No   Rupture Date     Rupture Time     Rupture Type Spontaneous rupture of membranes occuring during spontaneous labor or augmentation   Fluid Color     Labor Type     Induction    Induction Indication         Augmentation    Labor Complications     Additional Complications     Management of Labor        Antibiotics     IV Antibiotic Given     Additional Management     Fetal Status Prior to  Delivery     Fetal Status Comments         Cervical Ripening:    Date     Time     Type         Delivery:    Episiotomy None   Local Anesthetic        Lacerations 2nd   Sponge Count Correct       Needle Count Correct     Final Count by:    Sutures     Blood loss (ml)    Packing Intentionally Left In     Number     Comments           Information for the patient's :  Jose Jansen [[id number]]       Delivery  2018 11:09 PM by  Vaginal, Spontaneous Delivery  Sex:  female Gestational Age: 37w5d  Delivery Clinician:     Living?:            APGARS  One minute Five minutes Ten  minutes   Skin color:            Heart rate:            Grimace:            Muscle tone:            Breathing:            Totals: 9  9         Presentation/position:           Resuscitation and Interventions: Method:  None  Oxygen Type:     Intubation Time:   # of Attempts:     ETT Size:        Tracheal Suction:     Tracheal returns:      [place] Care at Delivery:  Baby immediately to mother's abdomen. Vigorously stimulated, lusty cry.           Cord information:     Disposition of cord blood:      Blood gases sent?    Complications:     Placenta: Delivered:           appearance.  Comments:  .  Disposition: Hospital disposal   Measurements:  Weight:    Height:    Head circumference:    Chest circumference:     Temperature:     Other providers:       Additional  information:  Forceps:    Verbal Informed Consent Obtained:       Alternative Labor Strategies Discussed:     Emergency Resources Available:       Type:       Accrued Pulling Time:       # of Pulls:      Position:     Fetal Station:       Indications:      Other Indications:     Operative Vaginal Delivery Brief Note Forceps:        Vacuum:    Verbal Informed Consent Obtained:     Alternative Labor Strategies Discussed:     Emergency Resources Available:     Type:      Accrued Pulling Time:       # of Pop-Offs:       # of Pulls:       Position:     Fetal Station:      Indications for Vacuum:       Other Indications:    Operative Vaginal Delivery Brief Note Vacuum:        Shoulder Dystocia Shoulder Dystocia    Fetal Tracing Prior to Delivery:  Category 2   Fetal Tracing Comments:  Variable decelerations wtih contractions that resolve after contractions                                            Breech:       : Type:     Indications for Primary:     Indications for Secondary:     Other Indications:        Observed anomalies     Output in Delivery Room: Voided

## 2018-01-14 NOTE — PROVIDER NOTIFICATION
01/13/18 2155   Provider Notification   Provider Name/Title PADMINI Ghosh   Method of Notification In Department   Request Evaluate - Remote   Notification Reason Labor Status;SVE;Decels     Updated provider on status post epidural. Reviewed strip with provider for variable decels. Plan to start BG checks Q1hr.

## 2018-01-14 NOTE — ANESTHESIA PREPROCEDURE EVALUATION
Anesthesia Evaluation       history and physical reviewed .      No history of anesthetic complications          ROS/MED HX    ENT/Pulmonary:  - neg pulmonary ROS     Neurologic:  - neg neurologic ROS     Cardiovascular:  - neg cardiovascular ROS       METS/Exercise Tolerance:     Hematologic:         Musculoskeletal:         GI/Hepatic:  - neg GI/hepatic ROS       Renal/Genitourinary:         Endo:     (+) thyroid problem .      Psychiatric:         Infectious Disease:         Malignancy:         Other:                     Physical Exam  Normal systems: cardiovascular, pulmonary and dental    Airway   Mallampati: II  TM distance: > 3 FB  Neck ROM: full  Mouth opening: > 3 cm    Dental     Cardiovascular       Pulmonary           neg OB ROS  (+) gestational diabetes               Anesthesia Plan      History & Physical Review      ASA Status:  .  OB Epidural Asa: 3 and emergent            Postoperative Care      Consents  Anesthetic plan, risks, benefits and alternatives discussed with:  Patient..

## 2018-01-14 NOTE — ANESTHESIA PROCEDURE NOTES
Combined Spinal/Epidural procedure note    Staff  Anesthesiologist:  JAQUELINE BECERRA  Location:  OB  Timeout  patient identified, IV checked, site marked, risks and benefits discussed, informed consent, monitors and equipment checked and pre-op evaluation    Correct Patient: Yes    Correct Position: Yes    Correct Site: Yes    Correct Procedure: Yes    Procedure details  Procedure:  Combined Spinal/Epidural (CSE)  Sterile Prep: chloraprep, patient draped, mask and sterile gloves    Insertion site:  L3-4  Local skin infiltration:  1% lidocaine  amount (mL):  5  Approach:  Midline  Epidural Needle Type:  MayoOmedix  Needle gauge (G):  17  Needle length (in):  3.5  Injection Technique:  LORT saline  VANGIE at (cm):  3.5  Attempts:  2  Spinal Needle (G):  25  Spinal Needle Type:  Paco  Spinal Needle Length (in):  4 11/16  Catheter gauge (G):  19  Catheter threaded easily: Yes    Threaded to skin (cm) :  8  Threaded in epidural space (cm):  4.5  Paresthesias:  No  CSF with Epidural needle: No    CSF with Spinal needle: Yes    Aspiration negative for Heme or CSF: Yes (positive on 1st placement at L4/5, negative on second and final attempt at L3/4)    Test dose (mL):  3  Local anesthetic for test dose:  Lidocaine 1.5% w/ 1:200,000 epinephrine  Test dose negative for signs of intravascular, subdural or intrathecal injection: Yes     1st attempt at L4/5 with VANGIE at 3.5 cm, spinal needle placed with +CSF and 1 mL of 0.25% PF bupivacaine injected. Catheter placed with +heme. Removed. 2nd attempt L3/4 again with VANGIE at 3.5, epidural threaded. Negative for heme, negative test dose.

## 2018-01-14 NOTE — PLAN OF CARE
Problem: Postpartum (Vaginal Delivery) (Adult,Obstetrics,Pediatric)  Goal: Signs and Symptoms of Listed Potential Problems Will be Absent, Minimized or Managed (Postpartum)  Signs and symptoms of listed potential problems will be absent, minimized or managed by discharge/transition of care (reference Postpartum (Vaginal Delivery) (Adult,Obstetrics,Pediatric) CPG).   Outcome: Improving  Stable PPD1. Unable to void when up to toilet at 0800, bladder scanned and straight cathed for 800mls light yellow urine. Fundus deviated to right prior to catheterization and midline u/1 immediately after, lochia consistently scant. Encouraged increased hydration and pt successfully voided at 1030. Stable on feet, Hgb today 11.2, saline lock removed. Breastfeeding assistance provided at each feeding session. Pt gradually becoming more comfortable handling baby and latching deeply. SNS with donor milk to maintain infant's blood glucose. Pt also assisted to use breastpump this shift and encouraged breastmassage/expression with each feed. Tolerating normal diet, plan is for POC fasting BG tomorrow AM, pt aware not to eat after midnight. Resting comfortably between infant cares, spouse and pt's mother supportive at bedside.

## 2018-01-14 NOTE — PROGRESS NOTES
"Labor Progress Note    S: Altamirano is comfortable with the epidural. Not feeling the urge to push.  and mother are at bedside and supportive. No questions or concerns. Agreeable to laboring down x 1 hour ur until urge to push. Discussed recommendation for [facility]. They would like time to think about it and will decide if they are agreeable prior to pushing.     O:  Blood pressure 110/64, pulse 63, temperature 98.4  F (36.9  C), temperature source Oral, resp. rate 18, height 1.651 m (5' 5\"), weight 63 kg (139 lb), last menstrual period 2017, SpO2 100 %, not currently breastfeeding.  General appearance: comfortable.  Contractions: Every 2-4 minutes. 60-90 seconds duration.  Palpate: strong.  Soft resting tone.   FHT: Baseline 130 with moderate variability. Accelerations present. Early and occasional variable decelerations present.  ROM: clear fluid. Membranes have been ruptured for <18 hours.  Pelvic exam: 10/100/+2 to +3 per RN exam    Recent Labs  Lab 18  2208 18  1750 18  1339 18  1230 18  1212   * 74 70 75 61*     Pitocin- none,  Antibiotics- PCN; adequately treated for GBS prophylaxis  S/p Miso PO x 2 doses    A:  27 year old  with IUP @ 37w5d IOL for rupture of membranes with no labor.  Fetal Heart rate tracing Category one overall with brief periods of cat II which resolve with position changes.   GBS- positive with adequate treatment  Patient Active Problem List   Diagnosis     Hypothyroidism, unspecified type     Normal first pregnancy in third trimester, [facility] CN[facility]     GBS bacteriuria     Abnormal 1 hour glucose test     GDM     Encounter for triage in pregnant patient     Labor and delivery, indication for care     P:  -Encourage movement and frequent position changes.  -Encourage PO fluid and nutrition as tolerated.   -Hourly blood sugars for GDMA1  -Continuous EFM.  -Continue to monitor closely for maternal and fetal wellbeing.  -Labor down x 1 hour, will begin " pushing sooner if pt feels urge or if FHR tracing indicates.   -Reassess patient in 1 hour, sooner prn.  -Anticipate   -MD consultant Parker on call / available prn       Davina Zhao CNM, APRN

## 2018-01-14 NOTE — PROGRESS NOTES
"Labor Progress Note    S: Altamirano is in bed and appears uncomfortable with contractions. Eyes closed and very focused. Desiring epidural at this time. Anesthesia at bedside preparing for epidural placement.  and mother at bedside and supportive.     O:  Blood pressure 110/59, pulse 63, temperature 98.1  F (36.7  C), temperature source Oral, resp. rate 20, height 1.651 m (5' 5\"), weight 63 kg (139 lb), last menstrual period 2017, SpO2 100 %, not currently breastfeeding.  General appearance: uncomfortable with contractions.  Contractions: Every 2.5-5 minutes.  seconds duration.  Palpate: moderate.  Soft resting tone.   FHT: Baseline 135 with moderate variability. Accelerations present. No decelerations present.  ROM: clear fluid. Membranes have been ruptured for <18 hours.  Pelvic exam: 3.5/60%/-1 per RN exam at 1923    Pitocin- none,  Antibiotics- PCN; adequately treated for GBS prophylaxis  S/p Miso PO x 2 doses    A:  27 year old  with IUP @ 37w5d IOL for SROM no labor   Fetal Heart rate tracing Category category one  GBS- positive; adequately treated  Patient Active Problem List   Diagnosis     Hypothyroidism, unspecified type     Normal first pregnancy in third trimester, [facility] CN[facility]     GBS bacteriuria     Abnormal 1 hour glucose test     GDM     Encounter for triage in pregnant patient     Labor and delivery, indication for care       P:  -Epidural placement at this time  -Once patient is comfortable, will reassess cervix to determine if pitocin is indicated for continued IOL.  -Encourage movement and frequent position changes.  -Encourage PO fluid and nutrition as tolerated.   -Continuous EFM.  -Continue to monitor closely for maternal and fetal wellbeing.  -Reassess patient in 1-2 hours, sooner prn.  -MD consultant Parker on call/ available prn       Davina Zhao, PADMINI, APRN    "

## 2018-01-15 VITALS
DIASTOLIC BLOOD PRESSURE: 60 MMHG | BODY MASS INDEX: 23.16 KG/M2 | OXYGEN SATURATION: 98 % | TEMPERATURE: 98.2 F | WEIGHT: 139 LBS | RESPIRATION RATE: 16 BRPM | HEART RATE: 71 BPM | HEIGHT: 65 IN | SYSTOLIC BLOOD PRESSURE: 97 MMHG

## 2018-01-15 LAB — GLUCOSE BLDC GLUCOMTR-MCNC: 78 MG/DL (ref 70–99)

## 2018-01-15 PROCEDURE — 90686 IIV4 VACC NO PRSV 0.5 ML IM: CPT | Performed by: ADVANCED PRACTICE MIDWIFE

## 2018-01-15 PROCEDURE — 25000128 H RX IP 250 OP 636: Performed by: ADVANCED PRACTICE MIDWIFE

## 2018-01-15 PROCEDURE — 00000146 ZZHCL STATISTIC GLUCOSE BY METER IP

## 2018-01-15 RX ADMIN — INFLUENZA A VIRUS A/MICHIGAN/45/2015 X-275 (H1N1) ANTIGEN (FORMALDEHYDE INACTIVATED), INFLUENZA A VIRUS A/HONG KONG/4801/2014 X-263B (H3N2) ANTIGEN (FORMALDEHYDE INACTIVATED), INFLUENZA B VIRUS B/PHUKET/3073/2013 ANTIGEN (FORMALDEHYDE INACTIVATED), AND INFLUENZA B VIRUS B/BRISBANE/60/2008 ANTIGEN (FORMALDEHYDE INACTIVATED) 0.5 ML: 15; 15; 15; 15 INJECTION, SUSPENSION INTRAMUSCULAR at 12:23

## 2018-01-15 NOTE — PLAN OF CARE
Problem: Postpartum (Vaginal Delivery) (Adult,Obstetrics,Pediatric)  Goal: Signs and Symptoms of Listed Potential Problems Will be Absent, Minimized or Managed (Postpartum)  Signs and symptoms of listed potential problems will be absent, minimized or managed by discharge/transition of care (reference Postpartum (Vaginal Delivery) (Adult,Obstetrics,Pediatric) CPG).   Outcome: Adequate for Discharge Date Met: 01/15/18  Data: Vital signs stable, assessments within normal limits.   No evidence of infection, patient instructed of signs/symptoms to look for and report per discharge instructions.   Discharge outcomes on care plan met.   No apparent pain.  Action: Review of care plan, teaching, and discharge instructions done with patient. Home meds and breast pump given to patient.   Response: patient states understandings and comfortable with self cares.  All questions about self care addressed. Patient is discharged to home with infant on 01/15/65728.

## 2018-01-15 NOTE — PLAN OF CARE
Problem: Postpartum (Vaginal Delivery) (Adult,Obstetrics,Pediatric)  Goal: Signs and Symptoms of Listed Potential Problems Will be Absent, Minimized or Managed (Postpartum)  Signs and symptoms of listed potential problems will be absent, minimized or managed by discharge/transition of care (reference Postpartum (Vaginal Delivery) (Adult,Obstetrics,Pediatric) CPG).   Outcome: No Change  VSS. Fundus firm and midline. Pain well managed with oral meds. Up ad jg, voiding freely. Breastfeeding well, needs assistance with latch. No results with hand expression/massage. Encouraged patient to continue pumping after every feeding. Education given about hand expression, skin to skin, and identifying feeding cues. Plan of care is to continue breastfeeding every 2-3 hours, without donor milk supplementation. Monitor for hypoglycemia symptoms in infant. Continue with breastfeeding support.

## 2018-01-15 NOTE — PLAN OF CARE
Home health care referral made with [facility] for Homecare visit because of being a first time mother.

## 2018-01-15 NOTE — PLAN OF CARE
Problem: Patient Care Overview  Goal: Plan of Care/Patient Progress Review  Outcome: Improving  PT. Is up ad jg, urinary retention has resolved.  Pt. Denies pain but sometimes endorses pain especially with movement but declines IBU or TYL.  She is independent with breastfeeding, pumping, massage and hand expression.  Fasting BG 78.  No concerns at this time.

## 2018-01-15 NOTE — DISCHARGE SUMMARY
Postpartum Note and Discharge Summary  Postpartum Day #2  SIGNIFICANT PROBLEMS:  Patient Active Problem List    Diagnosis Date Noted      (normal spontaneous vaginal delivery) 2018     Priority: Medium     Encounter for triage in pregnant patient 2018     Priority: Medium     Labor and delivery, indication for care 2018     Priority: Medium     GDM 2017     Priority: Medium     Diet controlled       Abnormal 1 hour glucose test 2017     Priority: Medium     11/3/17: 1 hour glucose = 173    2017: My Chart message sent, orders for 3 hour placed, Message sent to RNs to call pt       GBS bacteriuria 2017     Priority: Medium     17: + GBS in urine at intake. Pt agrees to IV abx in labor.        Hypothyroidism, unspecified type 2017     Priority: Medium     17: Reports she started taking Levothyroxine 50mcg .  17: TSH 1.97, repeat q trimester.  10/3/17: TSH  [1.55 ]  11/3  2.76        Normal first pregnancy in third trimester, [facility] CNM 2017     Priority: Medium     JOHNATHAN 18 by LMP, c/w 1st trimester u/s  17: Will decide on CNM v MD care after 1st trimester screen- CNM patient  Birth preferences: Mother and  for support (mother will be coming from [place])    17: first trimester screen wnl, anterior plac. Offer MSAFP at 15-20 weeks [   ]    Level II: bilateral choroid plexus cysts   MFM rec: repeat ultrasound at 30-32 weeks- ordered [ done- normal growth ]    11/3/17: Failed 1 hour, 17: 2 elevated values for GDM diagnosis       ADMISSION DIAGNOSIS:  Labor and Delivery  DISCHARGE DIAGNOSIS:    HOSPITAL COURSE:  37w5d  Evelia Jansen is a 27 year old female     [facility] 11:36 AM ruptured with no labor.     Delivery Note:  Evelia Jansen is a 27 year old   at  37w5d presented to labor floor with c/o spontaneous rupture of membranes for clear fluid without labor. Time of rupture: 0930.  Pregnancy complicated by GDMA1 with good control. Normal blood sugars in labor. Pt received 2 doses of oral misoprostal and from there progressed in labor without further induction agents. Progressed to complete and +2.  Pushed effectively with CNM coaching. FHR with variable decels with pushing effort with quick recovery to baseline following contractions, moderate variability throughout. Head delivered OA and restituted to LANCE . No nuchal cord. Shoulders easily delivered under maternal effort.  Live female  delivered at 2309 over a perineal laceration under epidural anesthesia.  Spontaneous breath, vigorous cry, well flexed, HR>100. Infant directly to maternal abdomen, skin to skin. Delayed cord clamping for 2 minutes, then clamped x2 and cut by father of the baby. Cord blood obtained for typing. 20 units of pitocin infusing  after baby. Brisk katie bleeding noted prior to delivery of placenta. Buccal misoprostol administered. Intact placenta spontaneously delivered via Mcgee at 2315. 3 vessel cord. Fundus firm @ u-2 with moderate rubra after fundal massage.  Vagina, perineum, and rectum inspected. Second degree perineal laceration repaired with a 3-0 vicryl suture on a CT-1 in the usual fashion. Brisk bleeding from laceration initially making visualization of the apex challenging. 3 locking sutures placed to achieve hemostasis prior to completing the repair. Hemostasis noted. Mother and infant stable; continued skin to skin. Good family bonding observed.   Apgars 9/9.  Weight pending    Placenta to pathology: No  Cord gases not indicated  Delivery Summary:  IUP at 37 weeks 5 days gestation delivered on 2018.     delivery of a viable Female infant.  Weight : pending  Apgars of 9 at 1 minute and 9 at 5 minutes.  Labor was induced.  Medications administered  in labor:  Pain Rx Epidural; Antibiotics Yes: PCN and IV antibiotics infused greater than 4 hours  Perineum: 2nd  "degree  Placenta-mechanism: spontaneous, intact,  with a 3 vessel cord. IV oxytocin was given After delivery of baby  Quantitative Blood Loss was 614  Complications of labor and delivery: Gestational Diabetes diet controlled  Anticipated Discharge Date: 1/15/2018                      Birth attendants: Davina Zhao, APRN, CNM    INTERVAL HISTORY:  BP (!) 88/51  Pulse 89  Temp 98.1  F (36.7  C) (Oral)  Resp 16  Ht 1.651 m (5' 5\")  Wt 63 kg (139 lb)  LMP 04/24/2017 (Approximate)  SpO2 98%  Breastfeeding? Unknown  BMI 23.13 kg/m2  Pt stable, baby is rooming in.   Breast feeding status: initiated and going well. Notes occasional discomfort during feeding. Discussed ideal latch.  Complications since 2 hours post delivery: None  Patient is tolerating activity well. Voiding without difficulty, BM without discomfort or difficulty. Cramping is minimal and relieved by ibuprofen.  Lochia is decreasing and patient denies clots.  Perineal pain is is minimal.  The perineum laceration is well approximated.    Physical Exam:   Breasts: soft, nontender, nipples intact  Abdomen: soft, nontender, fundus at U  Lochia: small, rubra, no clots  Perineum: well-approximated, no edema or bruising  Extremities: no edema    Postpartum hemoglobin   Hemoglobin   Date Value Ref Range Status   01/14/2018 11.2 (L) 11.7 - 15.7 g/dL Final     Prenatal Labs:   Lab Results   Component Value Date    ABO B 01/13/2018    RH Pos 01/13/2018    AS Neg 06/29/2017    HEPBANG Nonreactive 06/29/2017    TREPAB Negative 01/13/2018    RUQIGG 116 06/29/2017     Rubella: immune  History of depression: denies. Postpartum depression warning signs reviewed.    ASSESSMENT/PLAN:  Normal postpartum exam, stable Post-partum day #2  Complications:hx of gestational diabetes, plan for 2 hour OGTT at 6 wks postpartum  Plan d/c home today. Home Visit Ordered- Yes: new breastfeeding mother  RTC 6 weeks  Postpartum warning s/s reviewed, including bleeding/clots, fever, " mastitis, or depression  Continue prenatal vitamins. Ibuprofen and stool softener as needed.  Birthcontrol planned: Condoms    PROCEDURES:  , 2nd deg repair    Current Discharge Medication List      START taking these medications    Details   sennosides (SENOKOT) 8.6 MG tablet Take 1 tablet by mouth 2 times daily as needed for constipation  Qty: 120 tablet, Refills: 1    Associated Diagnoses:  (normal spontaneous vaginal delivery)      ibuprofen (ADVIL/MOTRIN) 600 MG tablet Take 1 tablet (600 mg) by mouth every 6 hours as needed for moderate pain  Qty: 90 tablet, Refills: 1    Associated Diagnoses:  (normal spontaneous vaginal delivery)         CONTINUE these medications which have NOT CHANGED    Details   levothyroxine (SYNTHROID/LEVOTHROID) 50 MCG tablet Take 1 tablet (50 mcg) by mouth daily  Qty: 30 tablet, Refills: 3    Associated Diagnoses: Hypothyroidism, unspecified type      !! blood glucose monitoring (ACCU-CHEK FASTCLIX) lancets Use to test blood sugar 4 times daily or as directed.  Qty: 102 each, Refills: 11    Associated Diagnoses: Abnormal maternal glucose tolerance, antepartum      blood glucose monitoring (ACCU-CHEK GUIDE) test strip Use to test blood sugar 4 times daily or as directed.  Qty: 150 strip, Refills: 11    Associated Diagnoses: Abnormal maternal glucose tolerance, antepartum      Prenatal Vit-Fe Fumarate-FA (PRENATAL MULTIVITAMIN  PLUS IRON) 27-0.8 MG TABS per tablet Take 1 tablet by mouth daily      !! blood glucose monitoring (ACCU-CHEK FASTCLIX) lancets Use to test blood sugar 4 times daily or as directed.  Qty: 102 each, Refills: 11    Associated Diagnoses: Abnormal maternal glucose tolerance, antepartum      acetone, Urine, test (RELION KETONE TEST) STRP Use with first morning urine daily  Qty: 25 each, Refills: 11    Associated Diagnoses: Abnormal maternal glucose tolerance, antepartum       !! - Potential duplicate medications found. Please discuss with provider.         KELSEY ZhouM

## 2018-02-26 ENCOUNTER — OFFICE VISIT (OUTPATIENT)
Dept: OBGYN | Facility: CLINIC | Age: 28
End: 2018-02-26
Attending: ADVANCED PRACTICE MIDWIFE
Payer: COMMERCIAL

## 2018-02-26 VITALS
BODY MASS INDEX: 20.14 KG/M2 | HEART RATE: 70 BPM | DIASTOLIC BLOOD PRESSURE: 74 MMHG | WEIGHT: 120.9 LBS | SYSTOLIC BLOOD PRESSURE: 114 MMHG | HEIGHT: 65 IN

## 2018-02-26 DIAGNOSIS — E03.9 HYPOTHYROIDISM, UNSPECIFIED TYPE: ICD-10-CM

## 2018-02-26 DIAGNOSIS — O99.810 ABNORMAL MATERNAL GLUCOSE TOLERANCE, ANTEPARTUM: ICD-10-CM

## 2018-02-26 LAB
GLUCOSE 1H P 75 G GLC PO SERPL-MCNC: 180 MG/DL (ref 60–180)
GLUCOSE 2H P 75 G GLC PO SERPL-MCNC: 161 MG/DL (ref 60–155)
GLUCOSE P FAST SERPL-MCNC: 63 MG/DL (ref 60–95)
TSH SERPL DL<=0.005 MIU/L-ACNC: 3.49 MU/L (ref 0.4–4)

## 2018-02-26 PROCEDURE — 36415 COLL VENOUS BLD VENIPUNCTURE: CPT | Performed by: ADVANCED PRACTICE MIDWIFE

## 2018-02-26 PROCEDURE — 82951 GLUCOSE TOLERANCE TEST (GTT): CPT | Performed by: ADVANCED PRACTICE MIDWIFE

## 2018-02-26 PROCEDURE — G0463 HOSPITAL OUTPT CLINIC VISIT: HCPCS | Mod: ZF

## 2018-02-26 PROCEDURE — 84443 ASSAY THYROID STIM HORMONE: CPT | Performed by: ADVANCED PRACTICE MIDWIFE

## 2018-02-26 NOTE — NURSING NOTE
Chief Complaint   Patient presents with     Postpartum Care     SUBJECTIVE:   Evelia Jansen is here for her 6-week postpartum checkup.     PHQ-9 score: 0  Hx of Abuse:  No    Delivery Date: 2018.    Delivering provider:  Davina MCCLELLAN  Type of delivery:  .  Perineum:  tear, with repair.     Delivery complications: Gestational Diabetes diet controlled  Infant gender:  girl, weight 6 pounds 4 oz.  Feeding Method:   and Bottlefed.  Complications reported with feeding:  none, infant thriving .    Bleeding:  Moderate.  Duration:  6 week.  Menses resumed:  Yes   Bowel/Urinary problems:  No    Contraception Planned:  condoms  She  has not had intercourse since delivery..

## 2018-02-26 NOTE — PROGRESS NOTES
"    Nursing Notes:   Darshana Sage, [facility]  2018  9:52 AM  Incomplete  Chief Complaint   Patient presents with     Postpartum Care     SUBJECTIVE:   Evelia Jansen is here for her 6-week postpartum checkup.     PHQ-9 score: 0  Hx of Abuse:  No    Delivery Date: 2018.    Delivering provider:  Davina MCCLELLAN  Type of delivery:  .  Perineum:  tear, with repair.     Delivery complications: Gestational Diabetes diet controlled  Infant gender:  girl, weight 6 pounds 4 oz.  Feeding Method:   and Bottlefed.  Complications reported with feeding:  none, infant thriving .    Bleeding:  Moderate.  Duration:  6 week.  Menses resumed:  Yes   Bowel/Urinary problems:  No    Contraception Planned:  condoms  She  has not had intercourse since delivery..       ================================================================  ROS: 10 point ROS neg other than the symptoms noted above in the HPI.   Pt stopped taking synthroid 2-4 wks ago. May have had her menses last week when her bleeding picked up for a few days, no bleeding today  Used condoms in the past with success, plans again.  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 Mother here from [place] x 5 ms to help    EXAM:  /74  Pulse 70  Ht 1.651 m (5' 5\")  Wt 54.8 kg (120 lb 14.4 oz)  LMP 2017 (Approximate)  BMI 20.12 kg/m2    General: healthy, alert and no distress  Psych: NEGATIVE  Last PHQ-9 score on record= No Value exists for the : HP#PHQ9  Breasts:  Lactating, Nipples intact with no lesions, Non-tender and No S/S of yeast or mastitis  Abdomen: Benign, Soft, flat, non-tender, No masses, organomegaly and Diastasis less than 1-2 FB  Incision:  None   Vulva:  Normal genitalia and Bartholin's, Urethra, Crisfield's normal  Vagina:  normal with good muscle tone and epis/repair well healed  Cervix:  no lesions and pink, moist, closed, without lesion or CMT.    Uterus:  fully involuted and non-tender and " anteflexed, small, smooth, firm, mobile w/o pain    Adnexa:  Within normal limits and No masses, nodularity, tenderness  Recto-vaginal:   anus normal  ASSESSMENT:   Encounter Diagnoses   Name Primary?     Routine postpartum follow-up Yes     Postpartum care and examination of lactating mother      Hypothyroidism, unspecified type      GDM       Normal postpartum exam after   Pregnancy was complicated by:  Gestational Diabetes - diet controlled.      PLAN:  Orders Placed This Encounter   Procedures     2 hr GTT     TSH with free T4 reflex      No orders of the defined types were placed in this encounter.       Risks and benefits of prescribed medications discussed.  Medication instructions reviewed.  Discussed calcium intake, vitamins and supplements including Vitamin D  Exercise encouraged  Follow up in 1 year  Vitamin D supplement recommended  Will f/u w thyroid results and make plan re dx and meds after result.  Bernie Castro, APRN CNM

## 2018-02-26 NOTE — MR AVS SNAPSHOT
"              After Visit Summary   2/26/2018    Evelia Jansen    MRN: [id number]           Patient Information     Date Of Birth          1990        Visit Information        Provider Department      2/26/2018 9:15 AM Bernie Castro APRN CNM; PHONE,  [facility]        Today's Diagnoses     Routine postpartum follow-up    -  1    Postpartum care and examination of lactating mother        Hypothyroidism, unspecified type        GDM           Follow-ups after your visit        Who to contact     Please call your clinic at [phone] to:    Ask questions about your health    Make or cancel appointments    Discuss your medicines    Learn about your test results    Speak to your doctor            Additional Information About Your Visit        [employer] Information     [employer] gives you secure access to your electronic health record. If you see a primary care provider, you can also send messages to your care team and make appointments. If you have questions, please call your primary care clinic.  If you do not have a primary care provider, please call [phone] and they will assist you.      [employer] is an electronic gateway that provides easy, online access to your medical records. With [employer], you can request a clinic appointment, read your test results, renew a prescription or communicate with your care team.     To access your existing account, please contact your [facility] or call [phone] for assistance.        Care EveryWhere ID     This is your Care EveryWhere ID. This could be used by other organizations to access your [place] medical records  [id number]        Your Vitals Were     Pulse Height Last Period BMI (Body Mass Index)          70 1.651 m (5' 5\") 04/24/2017 (Approximate) 20.12 kg/m2         Blood Pressure from Last 3 Encounters:   02/26/18 114/74   01/15/18 97/60   01/10/18 102/66    Weight from Last 3 Encounters: "   02/26/18 54.8 kg (120 lb 14.4 oz)   01/13/18 63 kg (139 lb)   01/10/18 62.8 kg (138 lb 8 oz)              We Performed the Following     TSH with free T4 reflex        Primary Care Provider Fax #    [facility] [phone]       [address] [place] [id number]        Equal Access to Services     RAFAELANAHY TANYA : Hadii aad ku hadasho Soomaali, waaxda luqadaha, qaybta kaalmada adeegyada, waxay idiin hayaan adeeg césararthurgab laashokn . So [facility] [phone].    ATENCIÓN: Si habla español, tiene a tillman disposición servicios gratuitos de asistencia lingüística. Llame al [phone].    We comply with applicable federal civil rights laws and [place] laws. We do not discriminate on the basis of race, color, national origin, age, disability, sex, sexual orientation, or gender identity.            Thank you!     Thank you for choosing [facility]  for your care. Our goal is always to provide you with excellent care. Hearing back from our patients is one way we can continue to improve our services. Please take a few minutes to complete the written survey that you may receive in the mail after your visit with us. Thank you!             Your Updated Medication List - Protect others around you: Learn how to safely use, store and throw away your medicines at www.disposemymeds.org.          This list is accurate as of 2/26/18  2:49 PM.  Always use your most recent med list.                   Brand Name Dispense Instructions for use Diagnosis    acetone (Urine) test Strp    RELION KETONE TEST    25 each    Use with first morning urine daily    Abnormal maternal glucose tolerance, antepartum       * blood glucose monitoring lancets     102 each    Use to test blood sugar 4 times daily or as directed.    Abnormal maternal glucose tolerance, antepartum       * blood glucose monitoring lancets     102 each    Use to test blood sugar 4 times daily or as directed.    Abnormal maternal glucose  tolerance, antepartum       blood glucose monitoring test strip    ACCU-CHEK GUIDE    150 strip    Use to test blood sugar 4 times daily or as directed.    Abnormal maternal glucose tolerance, antepartum       ibuprofen 600 MG tablet    ADVIL/MOTRIN    90 tablet    Take 1 tablet (600 mg) by mouth every 6 hours as needed for moderate pain     (normal spontaneous vaginal delivery)       levothyroxine 50 MCG tablet    SYNTHROID/LEVOTHROID    30 tablet    Take 1 tablet (50 mcg) by mouth daily    Hypothyroidism, unspecified type       prenatal multivitamin plus iron 27-0.8 MG Tabs per tablet      Take 1 tablet by mouth daily        sennosides 8.6 MG tablet    SENOKOT    120 tablet    Take 1 tablet by mouth 2 times daily as needed for constipation     (normal spontaneous vaginal delivery)       * Notice:  This list has 2 medication(s) that are the same as other medications prescribed for you. Read the directions carefully, and ask your doctor or other care provider to review them with you.

## 2018-02-26 NOTE — LETTER
"2018       RE: Evelia Jansen  [address]  [place] [address]     Dear Colleague,    Thank you for referring your patient, Evelia Jansen, to the [facility] at [facility]. Please see a copy of my visit note below.        Nursing Notes:   Chu EDILBERTO Gilliland  2018  9:52 AM  Incomplete  Chief Complaint   Patient presents with     Postpartum Care     SUBJECTIVE:   Evelia Jansen is here for her 6-week postpartum checkup.     PHQ-9 score: 0  Hx of Abuse:  No    Delivery Date: 2018.    Delivering provider:  Davina MCCLELLAN  Type of delivery:  .  Perineum:  tear, with repair.     Delivery complications: Gestational Diabetes diet controlled  Infant gender:  girl, weight 6 pounds 4 oz.  Feeding Method:   and Bottlefed.  Complications reported with feeding:  none, infant thriving .    Bleeding:  Moderate.  Duration:  6 week.  Menses resumed:  Yes   Bowel/Urinary problems:  No    Contraception Planned:  condoms  She  has not had intercourse since delivery..       ================================================================  ROS: 10 point ROS neg other than the symptoms noted above in the HPI.   Pt stopped taking synthroid 2-4 wks ago. May have had her menses last week when her bleeding picked up for a few days, no bleeding today  Used condoms in the past with success, plans again.  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 Mother here from [place] x 5 ms to help    EXAM:  /74  Pulse 70  Ht 1.651 m (5' 5\")  Wt 54.8 kg (120 lb 14.4 oz)  LMP 2017 (Approximate)  BMI 20.12 kg/m2    General: healthy, alert and no distress  Psych: NEGATIVE  Last PHQ-9 score on record= No Value exists for the : HP#PHQ9  Breasts:  Lactating, Nipples intact with no lesions, Non-tender and No S/S of yeast or mastitis  Abdomen: Benign, Soft, flat, non-tender, No masses, organomegaly and Diastasis less than " 1-2 FB  Incision:  None   Vulva:  Normal genitalia and Bartholin's, Urethra, Taylors's normal  Vagina:  normal with good muscle tone and epis/repair well healed  Cervix:  no lesions and pink, moist, closed, without lesion or CMT.    Uterus:  fully involuted and non-tender and anteflexed, small, smooth, firm, mobile w/o pain    Adnexa:  Within normal limits and No masses, nodularity, tenderness  Recto-vaginal:   anus normal  ASSESSMENT:   Encounter Diagnoses   Name Primary?     Routine postpartum follow-up Yes     Postpartum care and examination of lactating mother      Hypothyroidism, unspecified type      GDM       Normal postpartum exam after   Pregnancy was complicated by:  Gestational Diabetes - diet controlled.      PLAN:  Orders Placed This Encounter   Procedures     2 hr GTT     TSH with free T4 reflex      No orders of the defined types were placed in this encounter.       Risks and benefits of prescribed medications discussed.  Medication instructions reviewed.  Discussed calcium intake, vitamins and supplements including Vitamin D  Exercise encouraged  Follow up in 1 year  Vitamin D supplement recommended  Will f/u w thyroid results and make plan re dx and meds after result.      Again, thank you for allowing me to participate in the care of your patient.      Sincerely,    KELSEY Sellers CNM

## 2018-03-01 ENCOUNTER — TELEPHONE (OUTPATIENT)
Dept: OBGYN | Facility: CLINIC | Age: 28
End: 2018-03-01

## 2018-03-02 ENCOUNTER — MEDICAL CORRESPONDENCE (OUTPATIENT)
Dept: HEALTH INFORMATION MANAGEMENT | Facility: CLINIC | Age: 28
End: 2018-03-02

## 2018-03-07 ASSESSMENT — ENCOUNTER SYMPTOMS: BACK PAIN: 1

## 2018-03-14 ENCOUNTER — MYC MEDICAL ADVICE (OUTPATIENT)
Dept: OBGYN | Facility: CLINIC | Age: 28
End: 2018-03-14

## 2018-03-14 ENCOUNTER — OFFICE VISIT (OUTPATIENT)
Dept: ENDOCRINOLOGY | Facility: CLINIC | Age: 28
End: 2018-03-14
Attending: ADVANCED PRACTICE MIDWIFE
Payer: COMMERCIAL

## 2018-03-14 VITALS
DIASTOLIC BLOOD PRESSURE: 71 MMHG | SYSTOLIC BLOOD PRESSURE: 104 MMHG | HEIGHT: 64 IN | BODY MASS INDEX: 20.13 KG/M2 | WEIGHT: 117.9 LBS | HEART RATE: 65 BPM

## 2018-03-14 DIAGNOSIS — R73.09 ABNORMAL GLUCOSE TOLERANCE TEST: Primary | ICD-10-CM

## 2018-03-14 DIAGNOSIS — O99.810 ABNORMAL MATERNAL GLUCOSE TOLERANCE, ANTEPARTUM: ICD-10-CM

## 2018-03-14 DIAGNOSIS — E03.9 HYPOTHYROIDISM, UNSPECIFIED TYPE: ICD-10-CM

## 2018-03-14 LAB
HBA1C MFR BLD: 5 % (ref 4.3–6)
TSH SERPL DL<=0.005 MIU/L-ACNC: 3.67 MU/L (ref 0.4–4)

## 2018-03-14 RX ORDER — LANCETS
EACH MISCELLANEOUS
Qty: 25 EACH | Refills: 3 | Status: SHIPPED | OUTPATIENT
Start: 2018-03-14 | End: 2020-01-10

## 2018-03-14 ASSESSMENT — PAIN SCALES - GENERAL: PAINLEVEL: NO PAIN (0)

## 2018-03-14 NOTE — MR AVS SNAPSHOT
After Visit Summary   3/14/2018    Evelia Jansen    MRN: [id number]           Patient Information     Date Of Birth          1990        Visit Information        Provider Department      3/14/2018 9:00 AM Bryan Viera MD [facility] Endocrinology        Today's Diagnoses     Abnormal glucose tolerance test    -  1    Hypothyroidism, unspecified type        GDM           Follow-ups after your visit        Your next 10 appointments already scheduled     Sep 14, 2018  9:20 AM CDT   (Arrive by 9:05 AM)   RETURN DIABETES with MD CORRY Borden [facility] Endocrinology ([facility] [place])    [address] [place] [id number] [phone]              Who to contact     Please call your clinic at [phone] to:    Ask questions about your health    Make or cancel appointments    Discuss your medicines    Learn about your test results    Speak to your doctor            Additional Information About Your Visit        MyChart Information     [employer] gives you secure access to your electronic health record. If you see a primary care provider, you can also send messages to your care team and make appointments. If you have questions, please call your primary care clinic.  If you do not have a primary care provider, please call [phone] and they will assist you.      [employer] is an electronic gateway that provides easy, online access to your medical records. With [employer], you can request a clinic appointment, read your test results, renew a prescription or communicate with your care team.     To access your existing account, please contact your [facility] or call [phone] for assistance.        Care EveryWhere ID     This is your Care EveryWhere ID. This could be used by other organizations to access your [place] medical records  [id number]        Your Vitals Were     Pulse Height Last Period BMI (Body Mass Index)           "65 1.626 m (5' 4\") 04/24/2017 (Approximate) 20.24 kg/m2         Blood Pressure from Last 3 Encounters:   03/14/18 104/71   02/26/18 114/74   01/15/18 97/60    Weight from Last 3 Encounters:   03/14/18 53.5 kg (117 lb 14.4 oz)   02/26/18 54.8 kg (120 lb 14.4 oz)   01/13/18 63 kg (139 lb)                 Today's Medication Changes          These changes are accurate as of 3/14/18 11:59 PM.  If you have any questions, ask your nurse or doctor.               These medicines have changed or have updated prescriptions.        Dose/Directions    * blood glucose monitoring lancets   This may have changed:  Another medication with the same name was changed. Make sure you understand how and when to take each.   Used for:  Abnormal maternal glucose tolerance, antepartum   Changed by:  Bryan Viera MD        Use to test blood sugar 4 times daily or as directed.   Quantity:  102 each   Refills:  11       * blood glucose monitoring lancets   This may have changed:  additional instructions   Used for:  Abnormal glucose tolerance test   Changed by:  Bryan Viera MD        Use to test blood sugar 2 times a week.   Quantity:  25 each   Refills:  3       blood glucose monitoring test strip   Commonly known as:  ACCU-CHEK GUIDE   This may have changed:  additional instructions   Used for:  Abnormal glucose tolerance test   Changed by:  Bryan Viera MD        Use to test blood sugar 2 times a week   Quantity:  25 strip   Refills:  3       * Notice:  This list has 2 medication(s) that are the same as other medications prescribed for you. Read the directions carefully, and ask your doctor or other care provider to review them with you.      Stop taking these medicines if you haven't already. Please contact your care team if you have questions.     levothyroxine 50 MCG tablet   Commonly known as:  SYNTHROID/LEVOTHROID   Stopped by:  Bryan Viera MD                Where to get your medicines      These " medications were sent to [facility] [place], [place] - [address] [facility]  [address] [facility] [address]     Phone:  [phone]     blood glucose monitoring lancets    blood glucose monitoring test strip                Primary Care Provider Fax #    [facility] [phone]       [address] [facility] [address]        Equal Access to Services     MICHELLE MARTÍNEZ : Hadii aad ku hadasho Soomaali, waaxda luqadaha, qaybta kaalmada adeegyada, waxay idiin hayaan adeeg kharthurgab lalucille . So [facility] [phone].    ATENCIÓN: Si habla español, tiene a tillman disposición servicios gratuitos de asistencia lingüística. Liangame al [phone].    We comply with applicable federal civil rights laws and [place] laws. We do not discriminate on the basis of race, color, national origin, age, disability, sex, sexual orientation, or gender identity.            Thank you!     Thank you for choosing [facility] ENDOCRINOLOGY  for your care. Our goal is always to provide you with excellent care. Hearing back from our patients is one way we can continue to improve our services. Please take a few minutes to complete the written survey that you may receive in the mail after your visit with us. Thank you!             Your Updated Medication List - Protect others around you: Learn how to safely use, store and throw away your medicines at www.disposemymeds.org.          This list is accurate as of 3/14/18 11:59 PM.  Always use your most recent med list.                   Brand Name Dispense Instructions for use Diagnosis    acetone (Urine) test Strp    RELION KETONE TEST    25 each    Use with first morning urine daily    Abnormal maternal glucose tolerance, antepartum       * blood glucose monitoring lancets     102 each    Use to test blood sugar 4 times daily or as directed.    Abnormal maternal glucose tolerance, antepartum       * blood glucose monitoring lancets     25 each    Use to test blood sugar  2 times a week.    Abnormal glucose tolerance test       blood glucose monitoring test strip    ACCU-CHEK GUIDE    25 strip    Use to test blood sugar 2 times a week    Abnormal glucose tolerance test       ibuprofen 600 MG tablet    ADVIL/MOTRIN    90 tablet    Take 1 tablet (600 mg) by mouth every 6 hours as needed for moderate pain     (normal spontaneous vaginal delivery)       prenatal multivitamin plus iron 27-0.8 MG Tabs per tablet      Take 1 tablet by mouth daily        sennosides 8.6 MG tablet    SENOKOT    120 tablet    Take 1 tablet by mouth 2 times daily as needed for constipation     (normal spontaneous vaginal delivery)       * Notice:  This list has 2 medication(s) that are the same as other medications prescribed for you. Read the directions carefully, and ask your doctor or other care provider to review them with you.

## 2018-03-14 NOTE — LETTER
3/14/2018       RE: Evelia Jansen  [address]  [address]  [facility] [address]     Dear Colleague,    Thank you for referring your patient, Evelia Jansen, to the [facility] ENDOCRINOLOGY at [facility]. Please see a copy of my visit note below.    Endocrinology Clinic Visit 3/14/2018    NAME:  Evelia Jansen  PCP:  Esha [facility]  MRN:  [id number]  Reason for Consult:  Gestational Diabetes and rule out DM2  Requesting Provider:  Rafael Aguilar    Chief Complaint     Chief Complaint   Patient presents with     Consult     gdm       History of Present Illness     Evelia Jansen is a 27 year old female who is seen in clinic for diabetes management.     Patient was diagnosed with gestational diabetes when she was pregnant with her first child when she failed the glucose tolerance test (173 at 1 hr). She was well controlled with diet management and did not require medications. At her 6th week post-partum check up, the patient failed the glucose tolerance test again (161 at 2 hrs). Patient has been checking her glucose 4x/day since she as diagnosed with gestational diabetes and continued to check even after she delivered. Patient denies having any family history of diabetes, but states that her mother is visiting and also checking her glucose levels and state that they are much higher than hers.   The patient also has a history of hypothyroidism. She was first diagnosed in [place] before coming to the  for school. [place] found to have normal thyroid levels. However, she was found to have hypothyroidism during her pregnancy and took synthroid for the extent of her pregnancy, but has stopped taking since delivering. Her latest TSH was 3.49 on 2/26/18 (at 6 week post-partum).    Most recent A1c checked was 5.0 today.    Associated Signs/Symptoms  Hypoglycemia: no. Hyperglycemia: none.Neuropathy: none. Vascular Symptoms: none. Angina/CHF: none. Ulcers: No. Amputations: No    Current  treatment strategy: diet controlled    Blood Glucose Monitoring: Patient brought in finger stick results:  Average B with range    AM fasting BG average: 82   Post-lunch average: 110   Post-dinner average: 115   Pre-bed average: 90  Hypoglycemia: None below 70    Diet:   Breakfast: low fat milk, slice of bread, eggs  Lunch: Tofu/pork/beef and veggies  Dinner: Tofu/pork/beef and veggies  Snacks: Fruit, nuts    Exercise: Decreased exercise since pregnancy. Participated in jump [employer] before.    Weight:   Wt Readings from Last 4 Encounters:   18 53.5 kg (117 lb 14.4 oz)   18 54.8 kg (120 lb 14.4 oz)   18 63 kg (139 lb)   01/10/18 62.8 kg (138 lb 8 oz)         Diabetes History   Diagnosis, onset and course prior to visit with me:   Diabetes education: Yes: for gestational diabetes  Diabetes Complications:  none    Eyes: No    Nephropathy: No    Neurologic: No      Amputations: No      Regular visits with Podiatrist: No  Hypoglycemia: No  Diabetes-related comorbidities: None  Smoking:  No    Problem List     Patient Active Problem List   Diagnosis     Hypothyroidism, unspecified type     Normal first pregnancy in third trimester, [facility]     GBS bacteriuria     Abnormal 1 hour glucose test     GDM     Encounter for triage in pregnant patient     Labor and delivery, indication for care      (normal spontaneous vaginal delivery)        Medications     Current Outpatient Prescriptions   Medication     blood glucose monitoring (ACCU-CHEK FASTCLIX) lancets     blood glucose monitoring (ACCU-CHEK FASTCLIX) lancets     blood glucose monitoring (ACCU-CHEK GUIDE) test strip     acetone, Urine, test (RELION KETONE TEST) STRP     Prenatal Vit-Fe Fumarate-FA (PRENATAL MULTIVITAMIN  PLUS IRON) 27-0.8 MG TABS per tablet     sennosides (SENOKOT) 8.6 MG tablet     ibuprofen (ADVIL/MOTRIN) 600 MG tablet     levothyroxine (SYNTHROID/LEVOTHROID) 50 MCG tablet     No current facility-administered medications  for this visit.         Allergies     No Known Allergies    Medical / Surgical History     Past Medical History:   Diagnosis Date     Hypothyroidism 06/13/2017    50mcg levothyroxine     No past surgical history on file.    Social History     Social History     Social History     Marital status:      Spouse name: Paige Martinez     Number of children: 0     Years of education: N/A     Occupational History     Not on file.     Social History Main Topics     Smoking status: Never Smoker     Smokeless tobacco: Never Used     Alcohol use No     Drug use: No     Sexual activity: Not Currently     Partners: Male     Other Topics Concern     Not on file     Social History Narrative    How much exercise per week? none    How much calcium per day? food       How much caffeine per day? none    How much vitamin D per day? prenatals    Do you/your family wear seatbelts?  Yes    Do you/your family use safety helmets? No    Do you/your family use sunscreen? Yes    Do you/your family keep firearms in the home? No    Do you/your family have a smoke detector(s)? Yes        Donna Romeo CMA 6/29/17       Family History     No family history of diabetes    ROS     Constitutional: no fevers, chills, night sweats. No weight loss or fatigue. Good appetite  Eyes: no vision changes, no eye redness, no diplopia  Ears, Nose, mouth, throat: no hearing changes, no tinnitus, no rhinorrhea, no nasal congestion  Cardiovascular: no chest pain, no orthopnea or PND, no edema, no palpitations  Respiratory: no dyspnea, no cough, no sputum, no wheezing  Gastrointestinal: no nausea, no vomiting, no abdominal pain, no diarrhea, no constipation  Genitourinary: no dysuria, no frequency, no urgency, no nocturia  Musculoskeletal: no joint pains, no back pain, no cramps, no fractures  Skin: no rash, no itching, no dryness, no ulcers, no hair loss  Neurological: no headache, no weakness, no numbness, no dizziness, no tremors  Psychiatric: no  "anxiety, no sadness  Hematologic/lymphatic: no easy bruising, no bleeding, no pallor    Physical Exam   /71  Pulse 65  Ht 1.626 m (5' 4\")  Wt 53.5 kg (117 lb 14.4 oz)  LMP 04/24/2017 (Approximate)  BMI 20.24 kg/m2     General: Comfortable, no obvious distress, normal body habitus  Eyes: Sclera anicteric, moist conjunctiva  HENT: Atraumatic, oropharynx clear, moist mucous membranes with no mucosal ulcerations  Neck: Trachea midline, supple. Thyroid: Thyroid is normal in size and texture  CV: Regular rhythm, normal rate. No murmurs auscultated  Resp: Clear to auscultation bilaterally, good effort  Abdomen:  Soft, non tender, non distended. Bowel sounds heard. No organomegaly.  Skin: No rashes, lesions, or subcutaneous nodules.   Psych: Alert and oriented x 3. Appropriate affect, good insight  Extremities: No peripheral edema  Foot exam:  Pulses:  Dorsalis pedis: present bilaterally  Posterior tibial: present bilaterally  Foot exam:  Vibration/Light touch: sensation present bilaterally  Skin of foot: intact bilaterally  Musculoskeletal: Appropriate muscle bulk and strength  Lymphatic: No cervical lymphadenopathy  Neuro: Moves all four extremities. No focal deficits on limited exam. Gait normal.       Labs/Imaging and Outside Records     Pertinent Labs were reviewed and updated in EPIC and old charts reviewed.  Radiology Results were  reviewed.    Summary of recent findings:   No results found for: A1C    TSH   Date Value Ref Range Status   02/26/2018 3.49 0.40 - 4.00 mU/L Final   11/03/2017 2.76 0.40 - 4.00 mU/L Final   10/03/2017 1.55 0.40 - 4.00 mU/L Final   07/12/2017 1.97 0.40 - 4.00 mU/L Final       No results found for: CR    No results for input(s): CHOL, HDL, LDL, TRIG, CHOLHDLRATIO in the last 87413 hours.    No results found for: [id number], [id number], [id number]    I personally reviewed the patient's outside records from [facility] EMR. Summary of pertinent findings in HPI.    Impression / Plan     1. " History of gestational diabetes, abnormal 6 week post-partum GTT.  Current glycemic control can be considered good.  Patient was diagnosed with gestational diabetes when pregnant with her first child. She was well controlled with diet changes, but was found to have an abnormal GTT at her 6 week post-partum visit. A1C today of 5.0. She does not have a family history of diabetes, but her mother has elevated glucose levels but has never been diagnosed. Patient does not meet criteria for diabetes currently, but we will follow her glucose levels. As patient had the abnormal GTT and possible history of hypothyroidism with normal BMI, it is somewhat concerning for a possible autoimmune component. We will monitor for now.  I discussed dietary concepts today with the patient, including: general nutrition guidelines and exercise.   I also discussed exercise recommendations with the patient, advising for a goal of moderate physical activity 30 minutes 3 days a week. Specific examples were discussed such as brisk walking.  -Fingerstick glucose twice a week (one before breakfast, one after a meal) for 6 months    2. History of possible hypothyroidism  Patient found to be hypothyroid while in [facility], but had a [place] (no abnormal results found in our system). Was placed on synthroid during pregnancy for hypothyroidism, but stopped once she delivered. TSH was 3.49 on 2/26/18. Her latest result is within the normal limit, but is a bit high for her age. This is all within the setting of a recent pregnancy. Mild concern for autoimmune cause, such as Hashimoto's thyroiditis. We will follow her thyroid function  -TSH today and check again in 6 months     3. Diabetes Complications: none.     4. Blood Pressure Management: Blood pressure is controlled. Currently is not on pharmacotherapy for this.     5.Lipid Management: Per the new ACC/ISRA/NHLBI guidelines, statins are recommended for individuals with diabetes aged 40-75  with LDL  without ASCVD, and for any individual with ASCVD. Currently the patient is not on a statin.     6. Smoking Status: Patient Pt is smoke free..     Test and/or medications prescribed today:  Orders Placed This Encounter   Procedures     TSH with free T4 reflex         Follow up: in 6 weeks in endocrine clinic    I, Connor Hastings, MS4, functioned as a scribe in the writing of this note for Dr. Viera.    I agree with the PFSH and ROS as completed by the Medical Student (MS).  The remainder of the encounter was performed by me and scribed by the MS.  The scribed note accurately reflects my personal services and the decisions made by me.    Bryan Viera MD  Endocrinology, Diabetes and Metabolism  [facility]

## 2018-03-14 NOTE — PROGRESS NOTES
Endocrinology Clinic Visit 3/14/2018    NAME:  Evelia Jansen  PCP:  Esha, [facility]  MRN:  [id number]  Reason for Consult:  Gestational Diabetes and rule out DM2  Requesting Provider:  Rafael Aguilar    Chief Complaint     Chief Complaint   Patient presents with     Consult     gdm       History of Present Illness     Evelia Jansen is a 27 year old female who is seen in clinic for diabetes management.     Patient was diagnosed with gestational diabetes when she was pregnant with her first child when she failed the glucose tolerance test (173 at 1 hr). She was well controlled with diet management and did not require medications. At her 6th week post-partum check up, the patient failed the glucose tolerance test again (161 at 2 hrs). Patient has been checking her glucose 4x/day since she as diagnosed with gestational diabetes and continued to check even after she delivered. Patient denies having any family history of diabetes, but states that her mother is visiting and also checking her glucose levels and state that they are much higher than hers.   The patient also has a history of hypothyroidism. She was first diagnosed in [place] before coming to the  for school. [place] found to have normal thyroid levels. However, she was found to have hypothyroidism during her pregnancy and took synthroid for the extent of her pregnancy, but has stopped taking since delivering. Her latest TSH was 3.49 on 18 (at 6 week post-partum).    Most recent A1c checked was 5.0 today.    Associated Signs/Symptoms  Hypoglycemia: no. Hyperglycemia: none.Neuropathy: none. Vascular Symptoms: none. Angina/CHF: none. Ulcers: No. Amputations: No    Current treatment strategy: diet controlled    Blood Glucose Monitoring: Patient brought in finger stick results:  Average B with range    AM fasting BG average: 82   Post-lunch average: 110   Post-dinner average: 115   Pre-bed average: 90  Hypoglycemia: None below  70    Diet:   Breakfast: low fat milk, slice of bread, eggs  Lunch: Tofu/pork/beef and veggies  Dinner: Tofu/pork/beef and veggies  Snacks: Fruit, nuts    Exercise: Decreased exercise since pregnancy. Participated in jump rope before.    Weight:   Wt Readings from Last 4 Encounters:   18 53.5 kg (117 lb 14.4 oz)   18 54.8 kg (120 lb 14.4 oz)   18 63 kg (139 lb)   01/10/18 62.8 kg (138 lb 8 oz)         Diabetes History   Diagnosis, onset and course prior to visit with me:   Diabetes education: Yes: for gestational diabetes  Diabetes Complications:  none    Eyes: No    Nephropathy: No    Neurologic: No      Amputations: No      Regular visits with Podiatrist: No  Hypoglycemia: No  Diabetes-related comorbidities: None  Smoking:  No    Problem List     Patient Active Problem List   Diagnosis     Hypothyroidism, unspecified type     Normal first pregnancy in third trimester, [facility]     GBS bacteriuria     Abnormal 1 hour glucose test     GDM     Encounter for triage in pregnant patient     Labor and delivery, indication for care      (normal spontaneous vaginal delivery)        Medications     Current Outpatient Prescriptions   Medication     blood glucose monitoring (ACCU-CHEK FASTCLIX) lancets     blood glucose monitoring (ACCU-CHEK FASTCLIX) lancets     blood glucose monitoring (ACCU-CHEK GUIDE) test strip     acetone, Urine, test (RELION KETONE TEST) STRP     Prenatal Vit-Fe Fumarate-FA (PRENATAL MULTIVITAMIN  PLUS IRON) 27-0.8 MG TABS per tablet     sennosides (SENOKOT) 8.6 MG tablet     ibuprofen (ADVIL/MOTRIN) 600 MG tablet     levothyroxine (SYNTHROID/LEVOTHROID) 50 MCG tablet     No current facility-administered medications for this visit.         Allergies     No Known Allergies    Medical / Surgical History     Past Medical History:   Diagnosis Date     Hypothyroidism 2017    50mcg levothyroxine     No past surgical history on file.    Social History     Social History     Social  "History     Marital status:      Spouse name: Paige Martinez     Number of children: 0     Years of education: N/A     Occupational History     Not on file.     Social History Main Topics     Smoking status: Never Smoker     Smokeless tobacco: Never Used     Alcohol use No     Drug use: No     Sexual activity: Not Currently     Partners: Male     Other Topics Concern     Not on file     Social History Narrative    How much exercise per week? none    How much calcium per day? food       How much caffeine per day? none    How much vitamin D per day? prenatals    Do you/your family wear seatbelts?  Yes    Do you/your family use safety helmets? No    Do you/your family use sunscreen? Yes    Do you/your family keep firearms in the home? No    Do you/your family have a smoke detector(s)? Yes        Donna Romeo CMA 6/29/17       Family History     No family history of diabetes    ROS     Constitutional: no fevers, chills, night sweats. No weight loss or fatigue. Good appetite  Eyes: no vision changes, no eye redness, no diplopia  Ears, Nose, mouth, throat: no hearing changes, no tinnitus, no rhinorrhea, no nasal congestion  Cardiovascular: no chest pain, no orthopnea or PND, no edema, no palpitations  Respiratory: no dyspnea, no cough, no sputum, no wheezing  Gastrointestinal: no nausea, no vomiting, no abdominal pain, no diarrhea, no constipation  Genitourinary: no dysuria, no frequency, no urgency, no nocturia  Musculoskeletal: no joint pains, no back pain, no cramps, no fractures  Skin: no rash, no itching, no dryness, no ulcers, no hair loss  Neurological: no headache, no weakness, no numbness, no dizziness, no tremors  Psychiatric: no anxiety, no sadness  Hematologic/lymphatic: no easy bruising, no bleeding, no pallor    Physical Exam   /71  Pulse 65  Ht 1.626 m (5' 4\")  Wt 53.5 kg (117 lb 14.4 oz)  LMP 04/24/2017 (Approximate)  BMI 20.24 kg/m2     General: Comfortable, no obvious distress, " normal body habitus  Eyes: Sclera anicteric, moist conjunctiva  HENT: Atraumatic, oropharynx clear, moist mucous membranes with no mucosal ulcerations  Neck: Trachea midline, supple. Thyroid: Thyroid is normal in size and texture  CV: Regular rhythm, normal rate. No murmurs auscultated  Resp: Clear to auscultation bilaterally, good effort  Abdomen:  Soft, non tender, non distended. Bowel sounds heard. No organomegaly.  Skin: No rashes, lesions, or subcutaneous nodules.   Psych: Alert and oriented x 3. Appropriate affect, good insight  Extremities: No peripheral edema  Foot exam:  Pulses:  Dorsalis pedis: present bilaterally  Posterior tibial: present bilaterally  Foot exam:  Vibration/Light touch: sensation present bilaterally  Skin of foot: intact bilaterally  Musculoskeletal: Appropriate muscle bulk and strength  Lymphatic: No cervical lymphadenopathy  Neuro: Moves all four extremities. No focal deficits on limited exam. Gait normal.       Labs/Imaging and Outside Records     Pertinent Labs were reviewed and updated in EPIC and old charts reviewed.  Radiology Results were  reviewed.    Summary of recent findings:   No results found for: A1C    TSH   Date Value Ref Range Status   02/26/2018 3.49 0.40 - 4.00 mU/L Final   11/03/2017 2.76 0.40 - 4.00 mU/L Final   10/03/2017 1.55 0.40 - 4.00 mU/L Final   07/12/2017 1.97 0.40 - 4.00 mU/L Final       No results found for: CR    No results for input(s): CHOL, HDL, LDL, TRIG, CHOLHDLRATIO in the last 78313 hours.    No results found for: [id number], [id number], [id number]    I personally reviewed the patient's outside records from [facility] EMR. Summary of pertinent findings in HPI.    Impression / Plan     1. History of gestational diabetes, abnormal 6 week post-partum GTT.  Current glycemic control can be considered good.  Patient was diagnosed with gestational diabetes when pregnant with her first child. She was well controlled with diet changes, but was found to have an  abnormal GTT at her 6 week post-partum visit. A1C today of 5.0. She does not have a family history of diabetes, but her mother has elevated glucose levels but has never been diagnosed. Patient does not meet criteria for diabetes currently, but we will follow her glucose levels. As patient had the abnormal GTT and possible history of hypothyroidism with normal BMI, it is somewhat concerning for a possible autoimmune component. We will monitor for now.  I discussed dietary concepts today with the patient, including: general nutrition guidelines and exercise.   I also discussed exercise recommendations with the patient, advising for a goal of moderate physical activity 30 minutes 3 days a week. Specific examples were discussed such as brisk walking.  -Fingerstick glucose twice a week (one before breakfast, one after a meal) for 6 months    2. History of possible hypothyroidism  Patient found to be hypothyroid while in [facility], but had a [place] (no abnormal results found in our system). Was placed on synthroid during pregnancy for hypothyroidism, but stopped once she delivered. TSH was 3.49 on 2/26/18. Her latest result is within the normal limit, but is a bit high for her age. This is all within the setting of a recent pregnancy. Mild concern for autoimmune cause, such as Hashimoto's thyroiditis. We will follow her thyroid function  -TSH today and check again in 6 months     3. Diabetes Complications: none.     4. Blood Pressure Management: Blood pressure is controlled. Currently is not on pharmacotherapy for this.     5.Lipid Management: Per the new ACC/ISRA/NHLBI guidelines, statins are recommended for individuals with diabetes aged 40-75 with LDL  without ASCVD, and for any individual with ASCVD. Currently the patient is not on a statin.     6. Smoking Status: Patient Pt is smoke free..     Test and/or medications prescribed today:  Orders Placed This Encounter   Procedures     TSH with free T4  reflex         Follow up: in 6 weeks in endocrine clinic    I, Connor Hastings, MS4, functioned as a scribe in the writing of this note for Dr. Viera.    I agree with the PFSH and ROS as completed by the Medical Student (MS).  The remainder of the encounter was performed by me and scribed by the MS.  The scribed note accurately reflects my personal services and the decisions made by me.    Bryan Viera MD  Endocrinology, Diabetes and Metabolism  [facility]

## 2018-03-15 ENCOUNTER — TELEPHONE (OUTPATIENT)
Dept: OBGYN | Facility: CLINIC | Age: 28
End: 2018-03-15

## 2018-03-15 NOTE — TELEPHONE ENCOUNTER
Pt called to report she is continuing to bleed 8 weeks post partum. Pt reports changing a light pad three times daily but is not saturating the pad. Denies pain, denies odor, denies fever, denies abnormal discharge. Bleeding began to subside around 6 weeks but has been consistent since then.     Advised pt to call if any of the above discussed symptoms develop, otherwise OK to schedule in clinic if bleeding does not subside in one week.    Patient agreeable to plan and has no further questions at this time

## 2018-03-22 ENCOUNTER — OFFICE VISIT (OUTPATIENT)
Dept: OBGYN | Facility: CLINIC | Age: 28
End: 2018-03-22
Attending: ADVANCED PRACTICE MIDWIFE
Payer: COMMERCIAL

## 2018-03-22 VITALS
HEART RATE: 65 BPM | SYSTOLIC BLOOD PRESSURE: 101 MMHG | WEIGHT: 117 LBS | BODY MASS INDEX: 19.97 KG/M2 | DIASTOLIC BLOOD PRESSURE: 69 MMHG | HEIGHT: 64 IN

## 2018-03-22 PROCEDURE — G0463 HOSPITAL OUTPT CLINIC VISIT: HCPCS | Mod: ZF

## 2018-03-22 NOTE — MR AVS SNAPSHOT
"              After Visit Summary   3/22/2018    Evelia Jansen    MRN: [id number]           Patient Information     Date Of Birth          1990        Visit Information        Provider Department      3/22/2018 1:30 PM Davina Zhao APRN CNM [facility]         Follow-ups after your visit        Your next 10 appointments already scheduled     Sep 14, 2018  9:20 AM CDT   (Arrive by 9:05 AM)   RETURN DIABETES with Bryan Viera MD   [facility] Endocrinology ([facility])    [address] [place] [id number] [phone]              Who to contact     Please call your clinic at [phone] to:    Ask questions about your health    Make or cancel appointments    Discuss your medicines    Learn about your test results    Speak to your doctor            Additional Information About Your Visit        MyChart Information     [employer] gives you secure access to your electronic health record. If you see a primary care provider, you can also send messages to your care team and make appointments. If you have questions, please call your primary care clinic.  If you do not have a primary care provider, please call [phone] and they will assist you.      [employer] is an electronic gateway that provides easy, online access to your medical records. With [employer], you can request a clinic appointment, read your test results, renew a prescription or communicate with your care team.     To access your existing account, please contact your [facility] or call [phone] for assistance.        Care EveryWhere ID     This is your Care EveryWhere ID. This could be used by other organizations to access your [place] medical records  [id number]        Your Vitals Were     Pulse Height Last Period BMI (Body Mass Index)          65 1.626 m (5' 4.02\") 04/24/2017 (Approximate) 20.07 kg/m2         Blood Pressure from Last 3 " Encounters:   03/22/18 101/69   03/14/18 104/71   02/26/18 114/74    Weight from Last 3 Encounters:   03/22/18 53.1 kg (117 lb)   03/14/18 53.5 kg (117 lb 14.4 oz)   02/26/18 54.8 kg (120 lb 14.4 oz)              Today, you had the following     No orders found for display       Primary Care Provider Fax #    [facility] [phone]       [address] [place] [address]        Equal Access to Services     MICHELLE MARTÍNEZ : Hadii aad ku hadasho Soomaali, waaxda luqadaha, qaybta kaalmada adeegyada, waxnathaniel montgomeryin hayaan justin suazo . So [facility] [phone].    ATENCIÓN: Si habla español, tiene a tillman disposición servicios gratuitos de asistencia lingüística. Llame al [phone].    We comply with applicable federal civil rights laws and [place] laws. We do not discriminate on the basis of race, color, national origin, age, disability, sex, sexual orientation, or gender identity.            Thank you!     Thank you for choosing [facility]  for your care. Our goal is always to provide you with excellent care. Hearing back from our patients is one way we can continue to improve our services. Please take a few minutes to complete the written survey that you may receive in the mail after your visit with us. Thank you!             Your Updated Medication List - Protect others around you: Learn how to safely use, store and throw away your medicines at www.disposemymeds.org.          This list is accurate as of 3/22/18  1:50 PM.  Always use your most recent med list.                   Brand Name Dispense Instructions for use Diagnosis    acetone (Urine) test Strp    RELION KETONE TEST    25 each    Use with first morning urine daily    Abnormal maternal glucose tolerance, antepartum       * blood glucose monitoring lancets     102 each    Use to test blood sugar 4 times daily or as directed.    Abnormal maternal glucose tolerance, antepartum       * blood glucose monitoring  lancets     25 each    Use to test blood sugar 2 times a week.    Abnormal glucose tolerance test       blood glucose monitoring test strip    ACCU-CHEK GUIDE    25 strip    Use to test blood sugar 2 times a week    Abnormal glucose tolerance test       ibuprofen 600 MG tablet    ADVIL/MOTRIN    90 tablet    Take 1 tablet (600 mg) by mouth every 6 hours as needed for moderate pain     (normal spontaneous vaginal delivery)       prenatal multivitamin plus iron 27-0.8 MG Tabs per tablet      Take 1 tablet by mouth daily        sennosides 8.6 MG tablet    SENOKOT    120 tablet    Take 1 tablet by mouth 2 times daily as needed for constipation     (normal spontaneous vaginal delivery)       * Notice:  This list has 2 medication(s) that are the same as other medications prescribed for you. Read the directions carefully, and ask your doctor or other care provider to review them with you.

## 2018-03-22 NOTE — MR AVS SNAPSHOT
After Visit Summary   3/22/2018    Evelia Jansen    MRN: [id number]           Patient Information     Date Of Birth          1990        Visit Information        Provider Department      3/22/2018 2:30 PM [facility] ULTRASOUND II [facility]        Today's Diagnoses     Abnormal uterine bleeding, postpartum           Follow-ups after your visit        Your next 10 appointments already scheduled     Sep 14, 2018  9:20 AM CDT   (Arrive by 9:05 AM)   RETURN DIABETES with Bryan Viera MD   [facility] Endocrinology ([facility] [place])    [address] [place] [id number] [phone]              Who to contact     Please call your clinic at [phone] to:    Ask questions about your health    Make or cancel appointments    Discuss your medicines    Learn about your test results    Speak to your doctor            Additional Information About Your Visit        MyChart Information     [employer] gives you secure access to your electronic health record. If you see a primary care provider, you can also send messages to your care team and make appointments. If you have questions, please call your primary care clinic.  If you do not have a primary care provider, please call [phone] and they will assist you.      [employer] is an electronic gateway that provides easy, online access to your medical records. With [employer], you can request a clinic appointment, read your test results, renew a prescription or communicate with your care team.     To access your existing account, please contact your [facility] or call [phone] for assistance.        Care EveryWhere ID     This is your Care EveryWhere ID. This could be used by other organizations to access your [place] medical records  [id number]        Your Vitals Were     Last Period                   04/24/2017 (Approximate)            Blood Pressure from Last 3  Encounters:   03/22/18 101/69   03/14/18 104/71   02/26/18 114/74    Weight from Last 3 Encounters:   03/22/18 53.1 kg (117 lb)   03/14/18 53.5 kg (117 lb 14.4 oz)   02/26/18 54.8 kg (120 lb 14.4 oz)              We Performed the Following     US Transvaginal Non OB        Primary Care Provider Fax #    [facility] [phone]       [address] [place] [phone]        Equal Access to Services     MICHELLE MARTÍNEZ : Hadii aad ku hadasho Soomaali, waaxda luqadaha, qaybta kaalmada adeegyada, waxnathaniel montgomeryin hayaan justin suazo . So [facility] [phone].    ATENCIÓN: Si habla español, tiene a tillman disposición servicios gratuitos de asistencia lingüística. Llame al [phone].    We comply with applicable federal civil rights laws and [place] laws. We do not discriminate on the basis of race, color, national origin, age, disability, sex, sexual orientation, or gender identity.            Thank you!     Thank you for choosing [facility]  for your care. Our goal is always to provide you with excellent care. Hearing back from our patients is one way we can continue to improve our services. Please take a few minutes to complete the written survey that you may receive in the mail after your visit with us. Thank you!             Your Updated Medication List - Protect others around you: Learn how to safely use, store and throw away your medicines at www.disposemymeds.org.          This list is accurate as of 3/22/18  4:27 PM.  Always use your most recent med list.                   Brand Name Dispense Instructions for use Diagnosis    acetone (Urine) test Strp    RELION KETONE TEST    25 each    Use with first morning urine daily    Abnormal maternal glucose tolerance, antepartum       * blood glucose monitoring lancets     102 each    Use to test blood sugar 4 times daily or as directed.    Abnormal maternal glucose tolerance, antepartum       * blood glucose monitoring lancets      25 each    Use to test blood sugar 2 times a week.    Abnormal glucose tolerance test       blood glucose monitoring test strip    ACCU-CHEK GUIDE    25 strip    Use to test blood sugar 2 times a week    Abnormal glucose tolerance test       ibuprofen 600 MG tablet    ADVIL/MOTRIN    90 tablet    Take 1 tablet (600 mg) by mouth every 6 hours as needed for moderate pain     (normal spontaneous vaginal delivery)       prenatal multivitamin plus iron 27-0.8 MG Tabs per tablet      Take 1 tablet by mouth daily        sennosides 8.6 MG tablet    SENOKOT    120 tablet    Take 1 tablet by mouth 2 times daily as needed for constipation     (normal spontaneous vaginal delivery)       * Notice:  This list has 2 medication(s) that are the same as other medications prescribed for you. Read the directions carefully, and ask your doctor or other care provider to review them with you.

## 2018-03-22 NOTE — LETTER
3/22/2018      RE: Evelia Jansen  [address]  [facility] [address]       27 year old female presents for gynecologic ultrasound indicated by irregular post partum bleeding.  This study was done transvaginally.    Uterine findings:   Presence: Visible Size: Normal 4.0 x 5.1 x 3.3 cm.  Endometrium = 7.7 mm. ? Irregular   Cx length = 21.9 mm.      Flexion:  Anteverted Position: Midline Margins: Smooth Shape: Normal   Contour: Regular Texture: Homogeneous Cavity: Abnormal Masses: Normal    Pelvic findings:    Right Adnexa: Normal   Left Adnexa: Normal   Bladder:  Normal         Cul - de - sac fluid: None    Ovarian follicles:   Right ovary:  Appears normal      Left ovary:  Appears normal      Comments:  Irregular endometrium, recommend gynecologic consult    DEREJE Cortes MD    [id number]

## 2018-03-22 NOTE — PROGRESS NOTES
27 year old female presents for gynecologic ultrasound indicated by irregular post partum bleeding.  This study was done transvaginally.    Uterine findings:   Presence: Visible Size: Normal 4.0 x 5.1 x 3.3 cm.  Endometrium = 7.7 mm. ? Irregular   Cx length = 21.9 mm.      Flexion:  Anteverted Position: Midline Margins: Smooth Shape: Normal   Contour: Regular Texture: Homogeneous Cavity: Abnormal Masses: Normal    Pelvic findings:    Right Adnexa: Normal   Left Adnexa: Normal   Bladder:  Normal         Cul - de - sac fluid: None    Ovarian follicles:   Right ovary:  Appears normal      Left ovary:  Appears normal      Comments:  Irregular endometrium, recommend gynecologic consult    DEREJE Cortes MD

## 2018-03-22 NOTE — LETTER
3/22/2018       RE: Evelia Jansen  [address]  [address]  [facility] [address]     Dear Colleague,    Thank you for referring your patient, Evelia Jansen, to the [facility] at [facility]. Please see a copy of my visit note below.    S Clinic Post-Partum Progress Note    S: Evelia Jansen is a 27 year old  who had a  on 2018 c/b GDM and GBS who presents today for vaginal bleeding. Patients post delivery HgB was 11.2. She states that for the first 5 weeks after delivery she was having some vaginal bleeding, saturating around 3 pads a day. At around week 6 post partum, she noticed the bleeding slowed down significantly to occasional spotting. Shortly after in week six, her bleeding picked up again, and she has been saturating 3 small pads per day every single day for the last 3 weeks. She has never had this much bleeding before she became pregnant. She notes that at times she has a small amount of abdominal pain just left of the umbilicus. This seems to come and go randomly. Patient has not had intercourse during pregnancy or since giving birth. She is still taking her PNV with iron. No known history of bleeding disorders. Denies lightheadedness, heart palpitations, orthostasis, nausea, vomiting, fatigue, weakness. Reports that she has been able to breastfeed exclusively, no issues with milk supply.     O: AOX3, NAD. Pleasant and conversational in english. Appriopriate mood and affect. Skin appears well perfused.     Pelvic Exam:  Vulva: No external lesions, no adenopathy, perineal laceration well-healed  Vagina: Moist, pink, no abnormal discharge, moderate amount of dark red blood present, well rugated, no lesions  Cervix: Parous, smooth, pink, no CMT  Uterus: Normal size, anteverted, non-tender, mobile  Ovaries: No mass, non-tender, mobile  Rectal exam: deferred    A: 27 year old  with persistent vaginal bleeding s/p . Differential includes retained  products, subinvolution, excessive bleeding with resumption of menses. Less likely but possible include infection, bleeding diathesis, adenomyosis, vascular malformation.    P: Patient is not exhibiting any signs or symptoms of anemia and therefore does not warrant HGB check at this time.    - Discussed warning signs/when to follow up including soaking more than 1 pad per hour, abdominal pain, fever or symptoms of anemia.     - Advised pt to schedule pelvic US      I, Ansley Espinoza, am serving as a scribe; to document services personally performed by  KELSEY Gómez CNM based on data collection and the provider's statements to me.     Ansley Espinoza    ----------    I agree with the note completed by the Medical Student, except for changes made by me. The encounter was performed by me and scribed by the student. The scribed note accurately reflects my personal services and decisions made by me.      Again, thank you for allowing me to participate in the care of your patient.      Sincerely,    KELSEY Li CNM

## 2018-03-22 NOTE — PROGRESS NOTES
[facility] Clinic Post-Partum Progress Note    S: Evelia Jansen is a 27 year old  who had a  on 2018 c/b GDM and GBS who presents today for vaginal bleeding. Patients post delivery HgB was 11.2. She states that for the first 5 weeks after delivery she was having some vaginal bleeding, saturating around 3 pads a day. At around week 6 post partum, she noticed the bleeding slowed down significantly to occasional spotting. Shortly after in week six, her bleeding picked up again, and she has been saturating 3 small pads per day every single day for the last 3 weeks. She has never had this much bleeding before she became pregnant. She notes that at times she has a small amount of abdominal pain just left of the umbilicus. This seems to come and go randomly. Patient has not had intercourse during pregnancy or since giving birth. She is still taking her PNV with iron. No known history of bleeding disorders. Denies lightheadedness, heart palpitations, orthostasis, nausea, vomiting, fatigue, weakness. Reports that she has been able to breastfeed exclusively, no issues with milk supply.     O: AOX3, NAD. Pleasant and conversational in english. Appriopriate mood and affect. Skin appears well perfused.     Pelvic Exam:  Vulva: No external lesions, no adenopathy, perineal laceration well-healed  Vagina: Moist, pink, no abnormal discharge, moderate amount of dark red blood present, well rugated, no lesions  Cervix: Parous, smooth, pink, no CMT  Uterus: Normal size, anteverted, non-tender, mobile  Ovaries: No mass, non-tender, mobile  Rectal exam: deferred    A: 27 year old  with persistent vaginal bleeding s/p . Differential includes retained products, subinvolution, excessive bleeding with resumption of menses. Less likely but possible include infection, bleeding diathesis, adenomyosis, vascular malformation.    P: Patient is not exhibiting any signs or symptoms of anemia and therefore does not warrant HGB check at this  time.    - Discussed warning signs/when to follow up including soaking more than 1 pad per hour, abdominal pain, fever or symptoms of anemia.     - Advised pt to schedule pelvic US      I, Ansley Espinoza, am serving as a scribe; to document services personally performed by  KELSEY Gómez CNM based on data collection and the provider's statements to me.     Ansley Espinoza    ----------    I agree with the note completed by the Medical Student, except for changes made by me. The encounter was performed by me and scribed by the student. The scribed note accurately reflects my personal services and decisions made by me.    KELSEY Li CNM

## 2018-03-22 NOTE — LETTER
3/22/2018       RE: Evelia Jansen  [address]  [address]  [facility] [address]     Dear Colleague,    Thank you for referring your patient, Evelia Jansen, to the [facility] at [facility]. Please see a copy of my visit note below.    27 year old female presents for gynecologic ultrasound indicated by irregular post partum bleeding.  This study was done transvaginally.    Uterine findings:   Presence: Visible Size: Normal 4.0 x 5.1 x 3.3 cm.  Endometrium = 7.7 mm. ? Irregular   Cx length = 21.9 mm.      Flexion:  Anteverted Position: Midline Margins: Smooth Shape: Normal   Contour: Regular Texture: Homogeneous Cavity: Abnormal Masses: Normal    Pelvic findings:    Right Adnexa: Normal   Left Adnexa: Normal   Bladder:  Normal         Cul - de - sac fluid: None    Ovarian follicles:   Right ovary:  Appears normal      Left ovary:  Appears normal      Comments:  Irregular endometrium, recommend gynecologic consult    DEREJE Cortes MD    Again, thank you for allowing me to participate in the care of your patient.      Sincerely,    [id number]

## 2018-03-23 ENCOUNTER — OFFICE VISIT (OUTPATIENT)
Dept: OBGYN | Facility: CLINIC | Age: 28
End: 2018-03-23
Attending: OBSTETRICS & GYNECOLOGY
Payer: COMMERCIAL

## 2018-03-23 VITALS
WEIGHT: 117 LBS | SYSTOLIC BLOOD PRESSURE: 99 MMHG | DIASTOLIC BLOOD PRESSURE: 63 MMHG | HEART RATE: 67 BPM | BODY MASS INDEX: 20.07 KG/M2

## 2018-03-23 DIAGNOSIS — N93.9 ABNORMAL UTERINE BLEEDING (AUB): Primary | ICD-10-CM

## 2018-03-23 PROCEDURE — G0463 HOSPITAL OUTPT CLINIC VISIT: HCPCS | Mod: ZF

## 2018-03-23 ASSESSMENT — PAIN SCALES - GENERAL: PAINLEVEL: NO PAIN (0)

## 2018-03-23 NOTE — PROGRESS NOTES
28 yo  has had bleeding daily since delivery 10 weeks ago.  Six weeks post partum had bleeding that was similar to period, but since has had daily bleeding, enough to fill a minipad.  Denies clots, denies symptoms of anemia except fatigue.  She continues to breast feed her infant and is interested in donating milk.  Denies fever, chills, pelvic pain or cramping.     ROS: 10 point ROS neg other than the symptoms noted above in the HPI.    Past Medical History:   Diagnosis Date     Hypothyroidism 2017    50mcg levothyroxine     No past surgical history on file.    Physical exam:  BP 99/63  Pulse 67  Wt 53.1 kg (117 lb)  LMP 2017 (Approximate)  Breastfeeding? Yes  BMI 20.07 kg/m2  Gen'l: appears well no distress  Resp: non-labored breathing  CV: regular rhythm, no tachycardia  Abd: soft, NT, ND  Pelvic exam: deferred, completed by CNM yesterday and normal except moderate dark, red blood in vault.    Pelvic ultrasound:  This study was done transvaginally.     Uterine findings:                        Presence: Visible Size: Normal 4.0 x 5.1 x 3.3 cm.  Endometrium = 7.7 mm. ? Irregular                        Cx length = 21.9 mm.                            Flexion:  Anteverted              Position: Midline                    Margins: Smooth                   Shape: Normal                        Contour: Regular                  Texture: Homogeneous                    Cavity: Abnormal                  Masses: Normal     Pelvic findings:                         Right Adnexa: Normal                        Left Adnexa: Normal                        Bladder:  Normal                                                                                                             Cul - de - sac fluid: None     Ovarian follicles:                        Right ovary:  Appears normal                            Left ovary:  Appears normal       Comments:  Irregular endometrium, recommend gynecologic  consult      Assessment/Plan  Abnormal uterine bleeding in postpartum period    - discussed ultrasound findings, no obvious retained products.  Lining appeared somewhat irregular but was not thickened.   Disscussed that bleeding can be irregular in postpartum/breast feeding time frame.  Her bleeding though seems more than would be expected, more than spotting    - discussed hysteroscopy D&C vs progesterone withdrawal bleed.  Recommend trial of medical management first.  Patient and  are agreeable.  Prometrium 200mg x 14 days.  Expect bleeding after completion of course.  Will call if bleeding doesn't resolve 7 days after withdrawal bleed.  Would consider hysteroscopy at that time.    Gisele Canales MD

## 2018-03-23 NOTE — NURSING NOTE
Chief Complaint   Patient presents with     RECHECK     C/O irregular bleeding   Renita Juarez LPN

## 2018-03-23 NOTE — MR AVS SNAPSHOT
After Visit Summary   3/23/2018    Evelia Jansen    MRN: [id number]           Patient Information     Date Of Birth          1990        Visit Information        Provider Department      3/23/2018 2:15 PM Gisele Canales MD [facility]        Today's Diagnoses     Abnormal uterine bleeding (AUB)    -  1    Postpartum hemorrhage, unspecified type           Follow-ups after your visit        Your next 10 appointments already scheduled     Sep 14, 2018  9:20 AM CDT   (Arrive by 9:05 AM)   RETURN DIABETES with Bryan Viera MD   [facility] ([facility] [place])    [address] [place] [id number] [phone]              Who to contact     Please call your clinic at [phone] to:    Ask questions about your health    Make or cancel appointments    Discuss your medicines    Learn about your test results    Speak to your doctor            Additional Information About Your Visit        MyChart Information     [employer] gives you secure access to your electronic health record. If you see a primary care provider, you can also send messages to your care team and make appointments. If you have questions, please call your primary care clinic.  If you do not have a primary care provider, please call [phone] and they will assist you.      [employer] is an electronic gateway that provides easy, online access to your medical records. With [employer], you can request a clinic appointment, read your test results, renew a prescription or communicate with your care team.     To access your existing account, please contact your [facility] or call [phone] for assistance.        Care EveryWhere ID     This is your Care EveryWhere ID. This could be used by other organizations to access your [facility] medical records  [id number]        Your Vitals Were     Pulse Last Period Breastfeeding? BMI (Body Mass  Index)          67 04/24/2017 (Approximate) Yes 20.07 kg/m2         Blood Pressure from Last 3 Encounters:   03/23/18 99/63   03/22/18 101/69   03/14/18 104/71    Weight from Last 3 Encounters:   03/23/18 53.1 kg (117 lb)   03/22/18 53.1 kg (117 lb)   03/14/18 53.5 kg (117 lb 14.4 oz)              Today, you had the following     No orders found for display         Today's Medication Changes          These changes are accurate as of 3/23/18  4:36 PM.  If you have any questions, ask your nurse or doctor.               Start taking these medicines.        Dose/Directions    progesterone 200 MG capsule   Commonly known as:  PROMETRIUM   Used for:  Abnormal uterine bleeding (AUB), Postpartum hemorrhage, unspecified type   Started by:  Gisele Canales MD        Dose:  200 mg   Take 1 capsule (200 mg) by mouth daily for 10 days   Quantity:  14 capsule   Refills:  0         Stop taking these medicines if you haven't already. Please contact your care team if you have questions.     ibuprofen 600 MG tablet   Commonly known as:  ADVIL/MOTRIN   Stopped by:  Gisele Canales MD           sennosides 8.6 MG tablet   Commonly known as:  SENOKOT   Stopped by:  Gisele Canales MD                Where to get your medicines      These medications were sent to [facility] [place], [place] - [address]  [address] [address]     Phone:  [phone]     progesterone 200 MG capsule                Primary Care Provider Fax #    [place] [facility] [phone]       [address] [place] [address]        Equal Access to Services     [facility] Hospital[facility]IANNA AH: Hadii sarah cornejo hadasho Soomaali, waaxda luqadaha, qaybta kaalmada adeegyada, alistair winter. So [facility] [phone].    ATENCIÓN: Si habla español, tiene a tillman disposición servicios gratuitos de asistencia lingüística. Llame al [phone].    We comply with applicable federal civil rights laws and [place]  laws. We do not discriminate on the basis of race, color, national origin, age, disability, sex, sexual orientation, or gender identity.            Thank you!     Thank you for choosing [facility]  for your care. Our goal is always to provide you with excellent care. Hearing back from our patients is one way we can continue to improve our services. Please take a few minutes to complete the written survey that you may receive in the mail after your visit with us. Thank you!             Your Updated Medication List - Protect others around you: Learn how to safely use, store and throw away your medicines at www.disposemymeds.org.          This list is accurate as of 3/23/18  4:36 PM.  Always use your most recent med list.                   Brand Name Dispense Instructions for use Diagnosis    acetone (Urine) test Strp    RELION KETONE TEST    25 each    Use with first morning urine daily    Abnormal maternal glucose tolerance, antepartum       * blood glucose monitoring lancets     102 each    Use to test blood sugar 4 times daily or as directed.    Abnormal maternal glucose tolerance, antepartum       * blood glucose monitoring lancets     25 each    Use to test blood sugar 2 times a week.    Abnormal glucose tolerance test       blood glucose monitoring test strip    ACCU-CHEK GUIDE    25 strip    Use to test blood sugar 2 times a week    Abnormal glucose tolerance test       prenatal multivitamin plus iron 27-0.8 MG Tabs per tablet      Take 1 tablet by mouth daily        progesterone 200 MG capsule    PROMETRIUM    14 capsule    Take 1 capsule (200 mg) by mouth daily for 10 days    Abnormal uterine bleeding (AUB), Postpartum hemorrhage, unspecified type       * Notice:  This list has 2 medication(s) that are the same as other medications prescribed for you. Read the directions carefully, and ask your doctor or other care provider to review them with you.

## 2018-03-23 NOTE — LETTER
3/23/2018       RE: Evelia Jansen  [address]  [address]  [facility] [address]     Dear Colleague,    Thank you for referring your patient, Evelia Jansen, to the [facility] at [facility]. Please see a copy of my visit note below.    26 yo  has had bleeding daily since delivery 10 weeks ago.  Six weeks post partum had bleeding that was similar to period, but since has had daily bleeding, enough to fill a minipad.  Denies clots, denies symptoms of anemia except fatigue.  She continues to breast feed her infant and is interested in donating milk.  Denies fever, chills, pelvic pain or cramping.     ROS: 10 point ROS neg other than the symptoms noted above in the HPI.    Past Medical History:   Diagnosis Date     Hypothyroidism 2017    50mcg levothyroxine     No past surgical history on file.    Physical exam:  BP 99/63  Pulse 67  Wt 53.1 kg (117 lb)  LMP 2017 (Approximate)  Breastfeeding? Yes  BMI 20.07 kg/m2  Gen'l: appears well no distress  Resp: non-labored breathing  CV: regular rhythm, no tachycardia  Abd: soft, NT, ND  Pelvic exam: deferred, completed by CNM yesterday and normal except moderate dark, red blood in vault.    Pelvic ultrasound:  This study was done transvaginally.     Uterine findings:                        Presence: Visible Size: Normal 4.0 x 5.1 x 3.3 cm.  Endometrium = 7.7 mm. ? Irregular                        Cx length = 21.9 mm.                            Flexion:  Anteverted              Position: Midline                    Margins: Smooth                   Shape: Normal                        Contour: Regular                  Texture: Homogeneous                    Cavity: Abnormal                  Masses: Normal     Pelvic findings:                         Right Adnexa: Normal                        Left Adnexa: Normal                        Bladder:  Normal                                                                                                              Cul - de - sac fluid: None     Ovarian follicles:                        Right ovary:  Appears normal                            Left ovary:  Appears normal       Comments:  Irregular endometrium, recommend gynecologic consult      Assessment/Plan  Abnormal uterine bleeding in postpartum period    - discussed ultrasound findings, no obvious retained products.  Lining appeared somewhat irregular but was not thickened.   Disscussed that bleeding can be irregular in postpartum/breast feeding time frame.  Her bleeding though seems more than would be expected, more than spotting    - discussed hysteroscopy D&C vs progesterone withdrawal bleed.  Recommend trial of medical management first.  Patient and  are agreeable.  Prometrium 200mg x 14 days.  Expect bleeding after completion of course.  Will call if bleeding doesn't resolve 7 days after withdrawal bleed.  Would consider hysteroscopy at that time.    Gisele Canales MD

## 2018-09-14 ENCOUNTER — OFFICE VISIT (OUTPATIENT)
Dept: ENDOCRINOLOGY | Facility: CLINIC | Age: 28
End: 2018-09-14
Payer: COMMERCIAL

## 2018-09-14 VITALS
SYSTOLIC BLOOD PRESSURE: 100 MMHG | BODY MASS INDEX: 19 KG/M2 | DIASTOLIC BLOOD PRESSURE: 65 MMHG | HEART RATE: 71 BPM | HEIGHT: 64 IN | WEIGHT: 111.3 LBS

## 2018-09-14 DIAGNOSIS — E03.9 HYPOTHYROIDISM, UNSPECIFIED TYPE: ICD-10-CM

## 2018-09-14 DIAGNOSIS — Z86.32 HISTORY OF GESTATIONAL DIABETES: Primary | ICD-10-CM

## 2018-09-14 PROBLEM — O99.810 ABNORMAL MATERNAL GLUCOSE TOLERANCE, ANTEPARTUM: Status: RESOLVED | Noted: 2017-11-09 | Resolved: 2018-09-14

## 2018-09-14 PROBLEM — O99.810 ABNORMAL O'SULLIVAN GLUCOSE CHALLENGE TEST, ANTEPARTUM: Status: RESOLVED | Noted: 2017-11-05 | Resolved: 2018-09-14

## 2018-09-14 LAB — HBA1C MFR BLD: 4.9 % (ref 4.3–6)

## 2018-09-14 NOTE — LETTER
9/14/2018     RE: Evelia Jansen  [address]  [address]  [facility] [address]     Dear Colleague,    Thank you for referring your patient, Evelia Jansen, to the [facility] ENDOCRINOLOGY at [facility]. Please see a copy of my visit note below.    Endocrinology Clinic Visit 9/14/2018    NAME:  Evelia Jansen  PCP:  Esha [facility]  MRN:  [id number]  Reason for Consult:  Gestational diabetes, possible hypothyroidism  Requesting Provider:  Rafael Aguilar    Chief Complaint     Chief Complaint   Patient presents with     RECHECK     Diabetes       History of Present Illness     Evelia Jansen is a 28 year old female who is seen in clinic for follow up of history of GDM.    From initial consult note:  Patient was diagnosed with gestational diabetes when she was pregnant with her first child when she failed the glucose tolerance test (173 at 1 hr). She was well controlled with diet management and did not require medications. At her 6th week post-partum check up, the patient failed the glucose tolerance test again (161 at 2 hrs). The patient also has a history of hypothyroidism. She was first diagnosed in [place] before coming to the  for school. [place] found to have normal thyroid levels. However, she was found to have hypothyroidism during her pregnancy and took synthroid for the extent of her pregnancy, but has stopped taking since delivering. Her TSH was 3.49 on 2/26/18 (at 6 week post-partum).    Associated Signs/Symptoms  Hypoglycemia: no. Hyperglycemia: none.Neuropathy: none. Vascular Symptoms: none. Angina/CHF: none. Ulcers: No. Amputations: No     Current treatment strategy: diet controlled     Blood Glucose Monitoring: Patient lost her meter in [place] and has not been monitoring recently     Diet:   Breakfast: low fat milk, slice of bread, eggs  Lunch: Tofu/pork/beef and veggies  Dinner: Tofu/pork/beef and veggies  Snacks: Fruit, nuts     Exercise:she has been back to  exercising.   She has lost 6 lbs in 6 months.     Interval History:  Since last visit has been doing well, lost her glucometer while visiting [place] in May so has not checked blood sugars since. No changes in diet, continues to eat some carbs in form of rice and noodles along with plenty of vegetables and protein. Has lost 6 pounds over the past 6 months, has been increasing exercise. Recheck A1C today 4.9%.    Also reviewed hypothyroid symptoms of which she denies fatigue, constipation, swelling, changes in skin/hair. Has not been taking thyroid replacement since giving birth.    Problem List     Patient Active Problem List   Diagnosis     Hypothyroidism, unspecified type     Normal first pregnancy in third trimester, [facility] CNM     GBS bacteriuria     Abnormal 1 hour glucose test     GDM     Encounter for triage in pregnant patient     Labor and delivery, indication for care      (normal spontaneous vaginal delivery)        Medications     Current Outpatient Prescriptions   Medication     acetone, Urine, test (RELION KETONE TEST) STRP     blood glucose monitoring (ACCU-CHEK FASTCLIX) lancets     blood glucose monitoring (ACCU-CHEK FASTCLIX) lancets     blood glucose monitoring (ACCU-CHEK GUIDE) test strip     No current facility-administered medications for this visit.         Allergies     No Known Allergies    Medical / Surgical History     Past Medical History:   Diagnosis Date     Hypothyroidism 2017    50mcg levothyroxine     No past surgical history on file.    Social History     Social History     Social History     Marital status:      Spouse name: Paige Martinez     Number of children: 0     Years of education: N/A     Occupational History     Not on file.     Social History Main Topics     Smoking status: Never Smoker     Smokeless tobacco: Never Used     Alcohol use No     Drug use: No     Sexual activity: Not Currently     Partners: Male     Other Topics Concern     Not on file     Social History  "Narrative    How much exercise per week? none    How much calcium per day? food       How much caffeine per day? none    How much vitamin D per day? prenatals    Do you/your family wear seatbelts?  Yes    Do you/your family use safety helmets? No    Do you/your family use sunscreen? Yes    Do you/your family keep firearms in the home? No    Do you/your family have a smoke detector(s)? Yes        Donna Sauceda-Cooley, CMA 6/29/17       Family History     No family history on file.    ROS     Negative other than listed above in HPI.    Physical Exam   /65 (BP Location: Right arm, Patient Position: Sitting, Cuff Size: Adult Small)  Pulse 71  Ht 1.626 m (5' 4\")  Wt 50.5 kg (111 lb 4.8 oz)  BMI 19.1 kg/m2     General: Comfortable, no obvious distress, normal body habitus  Eyes: Sclera anicteric, moist conjunctiva  HENT: Atraumatic, oropharynx clear, moist mucous membranes with no mucosal ulcerations  Neck: Trachea midline, supple. Thyroid: Thyroid is normal in size and texture  CV: Regular rhythm, normal rate. No murmurs auscultated  Resp: Clear to auscultation bilaterally, good effort  Abdomen:  Soft, non tender, non distended. Bowel sounds heard. No organomegaly. No striae  Skin: No rashes, lesions, or subcutaneous nodules.   Psych: Alert and oriented x 3. Appropriate affect, good insight  Extremities: No peripheral edema  Musculoskeletal: Appropriate muscle bulk and strength  Lymphatic: No cervical lymphadenopathy  Neuro: Moves all four extremities. No focal deficits on limited exam. Gait normal.     Labs/Imaging     Pertinent Labs were reviewed.  Radiology Results were  reviewed.    Summary of recent findings:   No results found for: A1C    TSH   Date Value Ref Range Status   03/14/2018 3.67 0.40 - 4.00 mU/L Final   02/26/2018 3.49 0.40 - 4.00 mU/L Final   11/03/2017 2.76 0.40 - 4.00 mU/L Final   10/03/2017 1.55 0.40 - 4.00 mU/L Final   07/12/2017 1.97 0.40 - 4.00 mU/L Final       Impression / Plan "     1. History of gestational diabetes  Since giving birth, blood sugars have been well controlled with diet and exercise, currently she does not have diabetes or pre-diabetes based on A1C. We reviewed that she will be at higher than usual risk for developing DM II given history of gestational diabetes and she will need to continue with smart lifestyle choices to help decrease that risk. Also discussed that if she gets pregnant again will need to be screened for gestational diabetes earlier than previously, I.e. During the first prenatal visit then at 24-28 weeks.    2. History of Hypothyroidism in the past:  Last 2 TSH checks were normal.   Will not recheck again today.     Test and/or medications prescribed today:  Orders Placed This Encounter   Procedures     Hemoglobin A1c POCT     Follow up: none scheduled    Patient seen and discussed with attending provider Dr. Viera.    [facility] [address]  Internal Medicine PGY3    Physician Attestation   I, Bryan Viera, saw this patient with the resident and agree with the resident's findings and plan of care as documented in the resident s note.      I personally reviewed vital signs, medications and labs.    Key findings: history of GDM, now normal A1c, reinforced importance of lifestyle changes.     Bryan Viera  Date of Service (when I saw the patient): 09/14/18

## 2018-09-14 NOTE — MR AVS SNAPSHOT
"              After Visit Summary   9/14/2018    Evelia Jansen    MRN: [id number]           Patient Information     Date Of Birth          1990        Visit Information        Provider Department      9/14/2018 9:30 AM Bryan Viera MD [facility] Endocrinology        Today's Diagnoses     History of gestational diabetes    -  1    Hypothyroidism, unspecified type           Follow-ups after your visit        Who to contact     Please call your clinic at [phone] to:    Ask questions about your health    Make or cancel appointments    Discuss your medicines    Learn about your test results    Speak to your doctor            Additional Information About Your Visit        MyChart Information     "[employer] gives you secure access to your electronic health record. If you see a primary care provider, you can also send messages to your care team and make appointments. If you have questions, please call your primary care clinic.  If you do not have a primary care provider, please call [phone] and they will assist you.      "[employer] is an electronic gateway that provides easy, online access to your medical records. With "[employer] you can request a clinic appointment, read your test results, renew a prescription or communicate with your care team.     To access your existing account, please contact your [facility] or call [phone] for assistance.        Care EveryWhere ID     This is your Care EveryWhere ID. This could be used by other organizations to access your [facility] medical records  [id number]        Your Vitals Were     Pulse Height BMI (Body Mass Index)             71 1.626 m (5' 4\") 19.1 kg/m2          Blood Pressure from Last 3 Encounters:   09/14/18 100/65   03/23/18 99/63   03/22/18 101/69    Weight from Last 3 Encounters:   09/14/18 50.5 kg (111 lb 4.8 oz)   03/23/18 53.1 kg (117 lb)   03/22/18 53.1 kg (117 lb)              We Performed the Following     Hemoglobin A1c " POCT        Primary Care Provider Fax #    [facility] [phone]       [address] [place] [id number]        Equal Access to Services     MICHELLE MARTÍNEZ : Hadii aad ku hadlorinyovana Leenabrunoali, watrangda luqadaha, qacarringtonta kaalmada maritza, alistair ramirez laashokjason iwnter. So [facility] [phone].    ATENCIÓN: Si habla español, tiene a tillman disposición servicios gratuitos de asistencia lingüística. Llame al [phone].    We comply with applicable federal civil rights laws and [place] laws. We do not discriminate on the basis of race, color, national origin, age, disability, sex, sexual orientation, or gender identity.            Thank you!     Thank you for choosing [facility]  for your care. Our goal is always to provide you with excellent care. Hearing back from our patients is one way we can continue to improve our services. Please take a few minutes to complete the written survey that you may receive in the mail after your visit with us. Thank you!             Your Updated Medication List - Protect others around you: Learn how to safely use, store and throw away your medicines at www.disposemymeds.org.          This list is accurate as of 9/14/18  1:03 PM.  Always use your most recent med list.                   Brand Name Dispense Instructions for use Diagnosis    acetone (Urine) test Strp    RELION KETONE TEST    25 each    Use with first morning urine daily    Abnormal maternal glucose tolerance, antepartum       * blood glucose monitoring lancets     102 each    Use to test blood sugar 4 times daily or as directed.    Abnormal maternal glucose tolerance, antepartum       * blood glucose monitoring lancets     25 each    Use to test blood sugar 2 times a week.    Abnormal glucose tolerance test       blood glucose monitoring test strip    ACCU-CHEK GUIDE    25 strip    Use to test blood sugar 2 times a week    Abnormal glucose tolerance test       * Notice:  This list has 2  medication(s) that are the same as other medications prescribed for you. Read the directions carefully, and ask your doctor or other care provider to review them with you.

## 2018-09-14 NOTE — PROGRESS NOTES
Endocrinology Clinic Visit 9/14/2018    NAME:  Evelia Jansen  PCP:  Esha, [facility]  MRN:  [id number]  Reason for Consult:  Gestational diabetes, possible hypothyroidism  Requesting Provider:  Rafael Aguilar    Chief Complaint     Chief Complaint   Patient presents with     RECHECK     Diabetes       History of Present Illness     Eevlia Jansen is a 28 year old female who is seen in clinic for follow up of history of GDM.    From initial consult note:  Patient was diagnosed with gestational diabetes when she was pregnant with her first child when she failed the glucose tolerance test (173 at 1 hr). She was well controlled with diet management and did not require medications. At her 6th week post-partum check up, the patient failed the glucose tolerance test again (161 at 2 hrs). The patient also has a history of hypothyroidism. She was first diagnosed in [place] before coming to the  for school. She was checked again in the  and found to have normal thyroid levels. However, she was found to have hypothyroidism during her pregnancy and took synthroid for the extent of her pregnancy, but has stopped taking since delivering. Her TSH was 3.49 on 2/26/18 (at 6 week post-partum).    Associated Signs/Symptoms  Hypoglycemia: no. Hyperglycemia: none.Neuropathy: none. Vascular Symptoms: none. Angina/CHF: none. Ulcers: No. Amputations: No     Current treatment strategy: diet controlled     Blood Glucose Monitoring: Patient lost her meter in [place] and has not been monitoring recently     Diet:   Breakfast: low fat milk, slice of bread, eggs  Lunch: Tofu/pork/beef and veggies  Dinner: Tofu/pork/beef and veggies  Snacks: Fruit, nuts     Exercise:she has been back to exercising.   She has lost 6 lbs in 6 months.     Interval History:  Since last visit has been doing well, lost her glucometer while visiting [place] in May so has not checked blood sugars since. No changes in diet, continues to eat some carbs in form of rice and noodles  along with plenty of vegetables and protein. Has lost 6 pounds over the past 6 months, has been increasing exercise. Recheck A1C today 4.9%.    Also reviewed hypothyroid symptoms of which she denies fatigue, constipation, swelling, changes in skin/hair. Has not been taking thyroid replacement since giving birth.    Problem List     Patient Active Problem List   Diagnosis     Hypothyroidism, unspecified type     Normal first pregnancy in third trimester, [facility] CNM     GBS bacteriuria     Abnormal 1 hour glucose test     GDM     Encounter for triage in pregnant patient     Labor and delivery, indication for care      (normal spontaneous vaginal delivery)        Medications     Current Outpatient Prescriptions   Medication     acetone, Urine, test (RELION KETONE TEST) STRP     blood glucose monitoring (ACCU-CHEK FASTCLIX) lancets     blood glucose monitoring (ACCU-CHEK FASTCLIX) lancets     blood glucose monitoring (ACCU-CHEK GUIDE) test strip     No current facility-administered medications for this visit.         Allergies     No Known Allergies    Medical / Surgical History     Past Medical History:   Diagnosis Date     Hypothyroidism 2017    50mcg levothyroxine     No past surgical history on file.    Social History     Social History     Social History     Marital status:      Spouse name: Paige Martinez     Number of children: 0     Years of education: N/A     Occupational History     Not on file.     Social History Main Topics     Smoking status: Never Smoker     Smokeless tobacco: Never Used     Alcohol use No     Drug use: No     Sexual activity: Not Currently     Partners: Male     Other Topics Concern     Not on file     Social History Narrative    How much exercise per week? none    How much calcium per day? food       How much caffeine per day? none    How much vitamin D per day? prenatals    Do you/your family wear seatbelts?  Yes    Do you/your family use safety helmets? No    Do you/your family  "use sunscreen? Yes    Do you/your family keep firearms in the home? No    Do you/your family have a smoke detector(s)? Yes        Donna Romeo EDILBERTO 6/29/17       Family History     No family history on file.    ROS     Negative other than listed above in HPI.    Physical Exam   /65 (BP Location: Right arm, Patient Position: Sitting, Cuff Size: Adult Small)  Pulse 71  Ht 1.626 m (5' 4\")  Wt 50.5 kg (111 lb 4.8 oz)  BMI 19.1 kg/m2     General: Comfortable, no obvious distress, normal body habitus  Eyes: Sclera anicteric, moist conjunctiva  HENT: Atraumatic, oropharynx clear, moist mucous membranes with no mucosal ulcerations  Neck: Trachea midline, supple. Thyroid: Thyroid is normal in size and texture  CV: Regular rhythm, normal rate. No murmurs auscultated  Resp: Clear to auscultation bilaterally, good effort  Abdomen:  Soft, non tender, non distended. Bowel sounds heard. No organomegaly. No striae  Skin: No rashes, lesions, or subcutaneous nodules.   Psych: Alert and oriented x 3. Appropriate affect, good insight  Extremities: No peripheral edema  Musculoskeletal: Appropriate muscle bulk and strength  Lymphatic: No cervical lymphadenopathy  Neuro: Moves all four extremities. No focal deficits on limited exam. Gait normal.     Labs/Imaging     Pertinent Labs were reviewed.  Radiology Results were  reviewed.    Summary of recent findings:   No results found for: A1C    TSH   Date Value Ref Range Status   03/14/2018 3.67 0.40 - 4.00 mU/L Final   02/26/2018 3.49 0.40 - 4.00 mU/L Final   11/03/2017 2.76 0.40 - 4.00 mU/L Final   10/03/2017 1.55 0.40 - 4.00 mU/L Final   07/12/2017 1.97 0.40 - 4.00 mU/L Final       Impression / Plan     1. History of gestational diabetes  Since giving birth, blood sugars have been well controlled with diet and exercise, currently she does not have diabetes or pre-diabetes based on A1C. We reviewed that she will be at higher than usual risk for developing DM II given " history of gestational diabetes and she will need to continue with smart lifestyle choices to help decrease that risk. Also discussed that if she gets pregnant again will need to be screened for gestational diabetes earlier than previously, I.e. During the first prenatal visit then at 24-28 weeks.    2. History of Hypothyroidism in the past:  Last 2 TSH checks were normal.   Will not recheck again today.       Test and/or medications prescribed today:  Orders Placed This Encounter   Procedures     Hemoglobin A1c POCT       Follow up: none scheduled      Patient seen and discussed with attending provider Dr. Viera.    [facility]  Internal Medicine PGY3      Physician Attestation   I, Bryan Viera, saw this patient with the resident and agree with the resident's findings and plan of care as documented in the resident s note.      I personally reviewed vital signs, medications and labs.    Key findings: history of GDM, now normal A1c, reinforced importance of lifestyle changes.     Bryan Viera  Date of Service (when I saw the patient): 09/14/18

## 2018-09-14 NOTE — NURSING NOTE
Chief Complaint   Patient presents with     RECHECK     Diabetes     Performed capillary puncture for A1C testing. Patient tolerated well.    Tammy Thomas [facility]  Endocrinology & Diabetes

## 2019-02-19 ENCOUNTER — OFFICE VISIT (OUTPATIENT)
Dept: OBGYN | Facility: CLINIC | Age: 29
End: 2019-02-19
Attending: ADVANCED PRACTICE MIDWIFE
Payer: COMMERCIAL

## 2019-02-19 VITALS
HEART RATE: 67 BPM | WEIGHT: 110.3 LBS | BODY MASS INDEX: 18.93 KG/M2 | DIASTOLIC BLOOD PRESSURE: 66 MMHG | SYSTOLIC BLOOD PRESSURE: 98 MMHG

## 2019-02-19 DIAGNOSIS — Z31.69 ENCOUNTER FOR PRECONCEPTION CONSULTATION: Primary | ICD-10-CM

## 2019-02-19 DIAGNOSIS — Z86.32 HISTORY OF GESTATIONAL DIABETES: ICD-10-CM

## 2019-02-19 DIAGNOSIS — Z86.39 HISTORY OF HYPOTHYROIDISM: ICD-10-CM

## 2019-02-19 LAB
HBA1C MFR BLD: 6 % (ref 0–5.6)
T4 FREE SERPL-MCNC: 1.01 NG/DL (ref 0.76–1.46)
TSH SERPL DL<=0.005 MIU/L-ACNC: 7.26 MU/L (ref 0.4–4)

## 2019-02-19 PROCEDURE — G0463 HOSPITAL OUTPT CLINIC VISIT: HCPCS | Mod: ZF

## 2019-02-19 PROCEDURE — 84443 ASSAY THYROID STIM HORMONE: CPT | Performed by: ADVANCED PRACTICE MIDWIFE

## 2019-02-19 PROCEDURE — 36415 COLL VENOUS BLD VENIPUNCTURE: CPT | Performed by: ADVANCED PRACTICE MIDWIFE

## 2019-02-19 PROCEDURE — 84439 ASSAY OF FREE THYROXINE: CPT | Performed by: ADVANCED PRACTICE MIDWIFE

## 2019-02-19 PROCEDURE — 83036 HEMOGLOBIN GLYCOSYLATED A1C: CPT | Performed by: ADVANCED PRACTICE MIDWIFE

## 2019-02-19 ASSESSMENT — PAIN SCALES - GENERAL: PAINLEVEL: NO PAIN (0)

## 2019-02-19 NOTE — LETTER
2019       RE: Evelia Jansen  [address]  [address]  [facility] [address]     Dear Colleague,    Thank you for referring your patient, Evelia Jansen, to the [facility] at [facility]. Please see a copy of my visit note below.    SUBJECTIVE  Evelia presents today with her supportive  and 13 month old daughter to discuss pregnancy planning. They will both graduate in a little over a year and would like to have a baby soon so that they do not have to relocate with a . They would like to discuss the risk of getting pregnant before their daughter is 18 months.     Evelia also reports a mild intermittent left periumbilical pain that comes and goes occasionally since giving birth. Having normal BMs. No nausea, vomiting, diarrhea or constipation.     Not taking prenatal vitamins but willing to start. Interested in rechecking her TSH and HgA1c before becoming pregnant.    OBJECTIVE  BP 98/66 (BP Location: Right arm, Patient Position: Sitting, Cuff Size: Adult Regular)   Pulse 67   Wt 50 kg (110 lb 4.8 oz)   BMI 18.93 kg/m      NAD, well-groomed and pleasant affect  Abdomen: soft, non-distended no masses. Non-tender with palpation    ASSESSMENT  28-year-old  13 months s/p   Hx GDMA1  Hx hypothyroidism - no current meds    PLAN  Labs: HgA1c, TSH with reflex  Reviewed increased risk of pregnancy complications including PTB with closely spaced pregnancy. Reviewed that optimal timing is >18 months from birth to conception.  Start prenatal vitamins - pt will purchase OTC  Follow up with Dr. Ortiz or Dr. Wagner if abdominal pain worsens or does not improve.   RTC for annual exam, sooner prn.      20 minutes total spent face-to-face with the pt with >50% of that time spent on counseling and coordination of care.    Davina Zhao, KELSEY WASHINGTON

## 2019-02-19 NOTE — NURSING NOTE
Chief Complaint   Patient presents with     Consult     discuss havng baby     Health Maintenance Due   Topic Date Due     INFLUENZA VACCINE (1) 09/01/2018     PHQ-2 Q1 YR  01/05/2019     Malissa Chang CMA on 2/19/2019 at 2:27 PM

## 2019-02-19 NOTE — PROGRESS NOTES
SUBJECTIVE  Evleia presents today with her supportive  and 13 month old daughter to discuss pregnancy planning. They will both graduate in a little over a year and would like to have a baby soon so that they do not have to relocate with a . They would like to discuss the risk of getting pregnant before their daughter is 18 months.     Evelia also reports a mild intermittent left periumbilical pain that comes and goes occasionally since giving birth. Having normal BMs. No nausea, vomiting, diarrhea or constipation.     Not taking prenatal vitamins but willing to start. Interested in rechecking her TSH and HgA1c before becoming pregnant.    OBJECTIVE  BP 98/66 (BP Location: Right arm, Patient Position: Sitting, Cuff Size: Adult Regular)   Pulse 67   Wt 50 kg (110 lb 4.8 oz)   BMI 18.93 kg/m     NAD, well-groomed and pleasant affect  Abdomen: soft, non-distended no masses. Non-tender with palpation    ASSESSMENT  28-year-old  13 months s/p   Hx GDMA1  Hx hypothyroidism - no current meds    PLAN  Labs: HgA1c, TSH with reflex  Reviewed increased risk of pregnancy complications including PTB with closely spaced pregnancy. Reviewed that optimal timing is >18 months from birth to conception.  Start prenatal vitamins - pt will purchase OTC  Follow up with Dr. Ortiz or Dr. Wagner if abdominal pain worsens or does not improve.   RTC for annual exam, sooner prn.      20 minutes total spent face-to-face with the pt with >50% of that time spent on counseling and coordination of care.    KELSEY Li CNM

## 2019-02-20 ENCOUNTER — TELEPHONE (OUTPATIENT)
Dept: OBGYN | Facility: CLINIC | Age: 29
End: 2019-02-20

## 2019-02-20 DIAGNOSIS — Z31.69 PRE-CONCEPTION COUNSELING: ICD-10-CM

## 2019-02-20 DIAGNOSIS — E03.8 SUBCLINICAL HYPOTHYROIDISM: Primary | ICD-10-CM

## 2019-02-20 DIAGNOSIS — R73.03 PRE-DIABETES: ICD-10-CM

## 2019-02-20 PROBLEM — Z36.89 ENCOUNTER FOR TRIAGE IN PREGNANT PATIENT: Status: RESOLVED | Noted: 2018-01-13 | Resolved: 2019-02-20

## 2019-02-20 RX ORDER — LEVOTHYROXINE SODIUM 25 UG/1
25 TABLET ORAL DAILY
Qty: 90 TABLET | Refills: 3 | Status: SHIPPED | OUTPATIENT
Start: 2019-02-20 | End: 2019-09-03

## 2019-02-20 NOTE — TELEPHONE ENCOUNTER
Noted pt considering pregnancy.  Has subclinical hypothyroidism (high TSH but normal T4) but per up to date recommend treating with low does Synthroid to avoid overt hypothyroidism in pre conception period.      HgA1c noted pre diabetes. Encourage dietary/exercise changes.    Voicemail left for pt to call back regarding lab work.  Routed to nurses in case patient calls back. Mychart sent.      Karina COLE, ARENM

## 2019-02-20 NOTE — TELEPHONE ENCOUNTER
Patient called to discuss lab results. Informed, per Karina Banegas's note, that patient should begin low dose levothyroxine for subclinical hypothyroidism.     Also discussed elevated HgA1c and advised exercise and diet to control blood sugars at this time.     Patient expressed understanding and agrees with plan. Has no further questions at this time.

## 2019-03-28 ENCOUNTER — MYC MEDICAL ADVICE (OUTPATIENT)
Dept: OBGYN | Facility: CLINIC | Age: 29
End: 2019-03-28

## 2019-03-28 DIAGNOSIS — E03.8 SUBCLINICAL HYPOTHYROIDISM: Primary | ICD-10-CM

## 2019-03-28 NOTE — TELEPHONE ENCOUNTER
Received [employer] message from patient requesting orders to redraw TSH. Per lab note from last TSH, repeat labs in 4-6 weeks.    Order placed for repeat labs and patient notified via [employer].

## 2019-05-20 DIAGNOSIS — E03.8 SUBCLINICAL HYPOTHYROIDISM: ICD-10-CM

## 2019-05-20 LAB — TSH SERPL DL<=0.005 MIU/L-ACNC: 3.76 MU/L (ref 0.4–4)

## 2019-05-20 PROCEDURE — 84443 ASSAY THYROID STIM HORMONE: CPT | Performed by: ADVANCED PRACTICE MIDWIFE

## 2019-05-20 PROCEDURE — 36415 COLL VENOUS BLD VENIPUNCTURE: CPT | Performed by: ADVANCED PRACTICE MIDWIFE

## 2019-08-05 ENCOUNTER — TELEPHONE (OUTPATIENT)
Dept: OBGYN | Facility: CLINIC | Age: 29
End: 2019-08-05

## 2019-08-05 NOTE — TELEPHONE ENCOUNTER
----- Message from Erikamariely Crawford sent at 8/2/2019  3:15 PM CDT -----  Regarding: OB Intake  Contact: [phone]  Patient called back asking to schedule her OB Intake appt.  line was busy. Please call patient back at [phone]    Thank you!  Erika    Please DO NOT send message and or reply back to sender. Call center Representatives DO NOT respond to Messages.

## 2019-08-08 ENCOUNTER — TELEPHONE (OUTPATIENT)
Dept: OBGYN | Facility: CLINIC | Age: 29
End: 2019-08-08

## 2019-08-08 NOTE — TELEPHONE ENCOUNTER
Altamirano is calling this morning with concern for some pink/dark brown spotting she noted with wiping this am. Patient states she notes spotting on the toilet paper with wiping, no saturation or red bleeding noted. Denies cramping pain.     Reviewed spotting in early pregnancy with Altamirano. Discussed that this could be a variation of normal. We discussed warning signs for miscarriage including advancing to bright red bleeding and painful cramping. Bleeding precautions discussed. Discussed to call with any concerning symptoms but ok to monitor at this time. Patient states understanding.

## 2019-09-03 ENCOUNTER — OFFICE VISIT (OUTPATIENT)
Dept: OBGYN | Facility: CLINIC | Age: 29
End: 2019-09-03
Attending: ADVANCED PRACTICE MIDWIFE
Payer: COMMERCIAL

## 2019-09-03 ENCOUNTER — ANCILLARY PROCEDURE (OUTPATIENT)
Dept: ULTRASOUND IMAGING | Facility: CLINIC | Age: 29
End: 2019-09-03
Attending: ADVANCED PRACTICE MIDWIFE
Payer: COMMERCIAL

## 2019-09-03 VITALS
BODY MASS INDEX: 18.49 KG/M2 | SYSTOLIC BLOOD PRESSURE: 93 MMHG | HEART RATE: 67 BPM | HEIGHT: 64 IN | DIASTOLIC BLOOD PRESSURE: 61 MMHG | WEIGHT: 108.3 LBS

## 2019-09-03 DIAGNOSIS — Z86.32 HISTORY OF GESTATIONAL DIABETES: ICD-10-CM

## 2019-09-03 DIAGNOSIS — O09.91 SUPERVISION OF HIGH RISK PREGNANCY IN FIRST TRIMESTER: Primary | ICD-10-CM

## 2019-09-03 DIAGNOSIS — E03.8 SUBCLINICAL HYPOTHYROIDISM: ICD-10-CM

## 2019-09-03 DIAGNOSIS — Z34.91 ENCOUNTER FOR SUPERVISION OF NORMAL PREGNANCY IN FIRST TRIMESTER, UNSPECIFIED GRAVIDITY: ICD-10-CM

## 2019-09-03 DIAGNOSIS — Z36.82 ENCOUNTER FOR (NT) NUCHAL TRANSLUCENCY SCAN: ICD-10-CM

## 2019-09-03 DIAGNOSIS — Z36.89 ENCOUNTER FOR FETAL ANATOMIC SURVEY: ICD-10-CM

## 2019-09-03 LAB
ABO + RH BLD: NORMAL
ABO + RH BLD: NORMAL
BLD GP AB SCN SERPL QL: NORMAL
BLOOD BANK CMNT PATIENT-IMP: NORMAL
ERYTHROCYTE [DISTWIDTH] IN BLOOD BY AUTOMATED COUNT: 12.3 % (ref 10–15)
HBA1C MFR BLD: 5.5 % (ref 0–5.6)
HCT VFR BLD AUTO: 39.8 % (ref 35–47)
HGB BLD-MCNC: 13.2 G/DL (ref 11.7–15.7)
MCH RBC QN AUTO: 29.7 PG (ref 26.5–33)
MCHC RBC AUTO-ENTMCNC: 33.2 G/DL (ref 31.5–36.5)
MCV RBC AUTO: 89 FL (ref 78–100)
PLATELET # BLD AUTO: 177 10E9/L (ref 150–450)
RBC # BLD AUTO: 4.45 10E12/L (ref 3.8–5.2)
SPECIMEN EXP DATE BLD: NORMAL
TSH SERPL DL<=0.005 MIU/L-ACNC: 2.72 MU/L (ref 0.4–4)
WBC # BLD AUTO: 7.1 10E9/L (ref 4–11)

## 2019-09-03 PROCEDURE — 86780 TREPONEMA PALLIDUM: CPT | Performed by: ADVANCED PRACTICE MIDWIFE

## 2019-09-03 PROCEDURE — 87389 HIV-1 AG W/HIV-1&-2 AB AG IA: CPT | Performed by: ADVANCED PRACTICE MIDWIFE

## 2019-09-03 PROCEDURE — 87340 HEPATITIS B SURFACE AG IA: CPT | Performed by: ADVANCED PRACTICE MIDWIFE

## 2019-09-03 PROCEDURE — 86787 VARICELLA-ZOSTER ANTIBODY: CPT | Performed by: ADVANCED PRACTICE MIDWIFE

## 2019-09-03 PROCEDURE — 87086 URINE CULTURE/COLONY COUNT: CPT | Performed by: ADVANCED PRACTICE MIDWIFE

## 2019-09-03 PROCEDURE — 86803 HEPATITIS C AB TEST: CPT | Performed by: ADVANCED PRACTICE MIDWIFE

## 2019-09-03 PROCEDURE — 86901 BLOOD TYPING SEROLOGIC RH(D): CPT | Performed by: ADVANCED PRACTICE MIDWIFE

## 2019-09-03 PROCEDURE — 84443 ASSAY THYROID STIM HORMONE: CPT | Performed by: ADVANCED PRACTICE MIDWIFE

## 2019-09-03 PROCEDURE — 76801 OB US < 14 WKS SINGLE FETUS: CPT

## 2019-09-03 PROCEDURE — 86762 RUBELLA ANTIBODY: CPT | Performed by: ADVANCED PRACTICE MIDWIFE

## 2019-09-03 PROCEDURE — 83036 HEMOGLOBIN GLYCOSYLATED A1C: CPT | Performed by: ADVANCED PRACTICE MIDWIFE

## 2019-09-03 PROCEDURE — 86706 HEP B SURFACE ANTIBODY: CPT | Performed by: ADVANCED PRACTICE MIDWIFE

## 2019-09-03 PROCEDURE — 82306 VITAMIN D 25 HYDROXY: CPT | Performed by: ADVANCED PRACTICE MIDWIFE

## 2019-09-03 PROCEDURE — 36415 COLL VENOUS BLD VENIPUNCTURE: CPT | Performed by: ADVANCED PRACTICE MIDWIFE

## 2019-09-03 PROCEDURE — G0463 HOSPITAL OUTPT CLINIC VISIT: HCPCS | Mod: 25,ZF

## 2019-09-03 PROCEDURE — 85027 COMPLETE CBC AUTOMATED: CPT | Performed by: ADVANCED PRACTICE MIDWIFE

## 2019-09-03 PROCEDURE — 83021 HEMOGLOBIN CHROMOTOGRAPHY: CPT | Performed by: ADVANCED PRACTICE MIDWIFE

## 2019-09-03 PROCEDURE — 86850 RBC ANTIBODY SCREEN: CPT | Performed by: ADVANCED PRACTICE MIDWIFE

## 2019-09-03 PROCEDURE — 86900 BLOOD TYPING SEROLOGIC ABO: CPT | Performed by: ADVANCED PRACTICE MIDWIFE

## 2019-09-03 RX ORDER — PNV NO.95/FERROUS FUM/FOLIC AC 28MG-0.8MG
TABLET ORAL
COMMUNITY

## 2019-09-03 RX ORDER — LEVOTHYROXINE SODIUM 25 UG/1
25 TABLET ORAL DAILY
Qty: 30 TABLET | Refills: 0 | Status: SHIPPED | OUTPATIENT
Start: 2019-09-03 | End: 2019-12-09

## 2019-09-03 ASSESSMENT — PAIN SCALES - GENERAL: PAINLEVEL: NO PAIN (0)

## 2019-09-03 ASSESSMENT — MIFFLIN-ST. JEOR: SCORE: 1201.25

## 2019-09-03 NOTE — PROGRESS NOTES
[facility] OB Intake note  Subjective   29 year old woman presents to clinic for initiation of OB care. No LMP recorded. Patient is pregnant.  at 9w6d by Estimated Date of Delivery: 2020 based on dating ultrasound. Reviewed dating ultrasound. Pregnancy is planned.      Symptoms since LMP include nausea, emesis in the PM. Patient has tried these relief measures: diet modification, small frequent meals, increased rest and increased fluids.    - Genetic/Infection questionnaire completed, risks include: hbg electrophoresis added.   Pt  does not have a recent known exposure to Parvo or CMV so IgG/IgM testing WILL NOT be ordered.   Have you traveled during the pregnancy?No  Have your sexual partner(s) travelled during the pregnancy?No    OTHER:  - History of gestational diabetes   - took postpartum 2 hour glucose test- failed 1/3 values  - Subclinical hypothyroidism- has been on 25mcg of synthroid since 2019. Currently taking, but ran out. Needs refill  -     - Current Medications    Current Outpatient Medications   Medication Sig Dispense Refill     levothyroxine (SYNTHROID/LEVOTHROID) 25 MCG tablet Take 1 tablet (25 mcg) by mouth daily 30 tablet 0     Prenatal Vit-Fe Fumarate-FA (PRENATAL VITAMIN) 27-0.8 MG TABS        acetone, Urine, test (RELION KETONE TEST) STRP Use with first morning urine daily (Patient not taking: Reported on 2018) 25 each 11     blood glucose monitoring (ACCU-CHEK FASTCLIX) lancets Use to test blood sugar 2 times a week. (Patient not taking: Reported on 2018) 25 each 3     blood glucose monitoring (ACCU-CHEK FASTCLIX) lancets Use to test blood sugar 4 times daily or as directed. (Patient not taking: Reported on 2018) 102 each 11     blood glucose monitoring (ACCU-CHEK GUIDE) test strip Use to test blood sugar 2 times a week (Patient not taking: Reported on 2018) 25 strip 3         - Co-morbids  Past Medical History:   Diagnosis Date     History of gestational diabetes  "     Hypothyroidism 2017    25mcg levothyroxine now     - Risk for GDM -  does  have Personal history of GDM   WILL have an early GCT and Hgb A1C    - High Risk for Pre E- meets one of following criteria The patient does not h/o Pre Eclampsia, Current multi fetal gestation, Pre Gestational Diabetes (Type 1 or Type 2), chronic hypertension, renal disease, Autoimmune disease (systematic lupus erythematosus, antiphospholipid syndrome) so WILL NOT start low dose aspirin (81mg) starting between 12 and 28 weeks to prevent early onset preeclampsia.    - Moderate risk - meets more than one of several moderate risk facrtors for Pre E - Nulliparity, Obesity (body mass index >30 kg/m2),Family history of preeclampsia (mother or sister), Sociodemographic characteristics ( race, low socioeconomic status), Age ?35 years, Personal history factors (e.g., low birthweight or small for gestational age, previous adverse pregnancy outcome, >10-year pregnancy interval)  so WILL NOT consider starting low dose aspirin (81mg) starting between 12 and 28 weeks to prevent early onset preeclampsia.    - The patient  does not have a history of spontaneous  birth so  WILL NOT consider progesterone starting at 16-20 weeks and/or serial transvaginal cervical length ultrasounds from 16-24 weeks.     -The patient does not have a history of immunosuppresion or HIV so Toxoplasma IgG/IgM WILL NOT be ordered.     PERSONAL/SOCIAL HISTORY  -  - \"Paige Lie\"  Both getting phDs at the [facility] .  History of anxiety or depression  :none- if Yes, therapy/medication/hospitalization: none  Additional items: Denies past or present domestic violence, sexual and psychological abuse.    Objective  -VS: reviewed and within normal limits   -General appearance: no acute distress, patient is comfortable   NEUROLOGICAL/PSYCHIATRIC   - Orientated x3,   -Mood and affect: : normal     Assessment/Plan  Altamirano was seen today for prenatal " care.    Diagnoses and all orders for this visit:    Supervision of high risk pregnancy in first trimester  -     [facility] REFERRAL-PREGNANCY  -     ABO/Rh Type and Screen  -     CBC with Platelets  -     HIV Antigen Antibody Combo  -     Hepatitis B Surface Antibody  -     Hepatitis B Surface Antigen  -     Varicella Zoster Virus Antibody IgG  -     Hepatitis C antibody  -     Treponema Abs w Reflex to RPR and Titer  -     25- OH-Vitamin D  -     Hgb with reflex to ELP or RBC Solubility (Sickle Cell Screen)  -     Hgb A1c  -     TSH with free T4 reflex  -     Urine Culture Aerobic Bacterial  -     Rubella Antibody IgG Quantitative    Subclinical hypothyroidism  -     levothyroxine (SYNTHROID/LEVOTHROID) 25 MCG tablet; Take 1 tablet (25 mcg) by mouth daily    Encounter for (NT) nuchal translucency scan  -     [facility] FETAL MED CTR REFERRAL-PREGNANCY    Encounter for fetal anatomic survey  -     [facility] FETAL MED CTR REFERRAL-PREGNANCY    History of gestational diabetes        29 year old  9w6d weeks of pregnancy with JOHNATHAN of 2020 by LMP of No LMP recorded. Patient is pregnant.. Ultrasound confirms.   Outpatient Encounter Medications as of 9/3/2019   Medication Sig Dispense Refill     levothyroxine (SYNTHROID/LEVOTHROID) 25 MCG tablet Take 1 tablet (25 mcg) by mouth daily 30 tablet 0     Prenatal Vit-Fe Fumarate-FA (PRENATAL VITAMIN) 27-0.8 MG TABS        acetone, Urine, test (RELION KETONE TEST) STRP Use with first morning urine daily (Patient not taking: Reported on 2018) 25 each 11     blood glucose monitoring (ACCU-CHEK FASTCLIX) lancets Use to test blood sugar 2 times a week. (Patient not taking: Reported on 2018) 25 each 3     blood glucose monitoring (ACCU-CHEK FASTCLIX) lancets Use to test blood sugar 4 times daily or as directed. (Patient not taking: Reported on 2018) 102 each 11     blood glucose monitoring (ACCU-CHEK GUIDE) test strip Use to test blood sugar 2 times a week  (Patient not taking: Reported on 9/14/2018) 25 strip 3     [DISCONTINUED] levothyroxine (SYNTHROID/LEVOTHROID) 25 MCG tablet Take 1 tablet (25 mcg) by mouth daily 90 tablet 3     No facility-administered encounter medications on file as of 9/3/2019.     No orders of the defined types were placed in this encounter.                Orders Placed This Encounter   Procedures     CBC with Platelets     HIV Antigen Antibody Combo     Hepatitis B Surface Antibody     Hepatitis B Surface Antigen     Varicella Zoster Virus Antibody IgG     Hepatitis C antibody     Treponema Abs w Reflex to RPR and Titer     25- OH-Vitamin D     Hgb with reflex to ELP or RBC Solubility (Sickle Cell Screen)     Hgb A1c     TSH with free T4 reflex     Rubella Antibody IgG Quantitative     [facility] REFERRAL-PREGNANCY     ABO/Rh Type and Screen     - Oriented to Practice, types of care, and how to reach a provider.   - Patient received 1st trimester new OB education packet complete with aide of The Expectant Family booklet including information on genetic screening test options.  - Patient desires 1st trimester screening and desires level II ultrasound which were ordered.  - Educational handout on the prevention of infections diseases during pregnancy provided.  - Patient was encouraged to start prenatal vitamins as tolerated.      - Pregnancy concerns to be addressed by provider at new OB exam include: needs 1 hour glucose test, review thyroid labs and synthroid dosing, needs gc/ct at NOB.    - OBI education reviewed.  - OBI labs ordered.  - Reviewed risk for diabetes d/t hx of GDM last pregnancy. Hbga1c added to labs.   Needs 1 hour glucose at NOB.  - Thyroid labs ordered. Refill sent for 25mcg synthroid. Adjust dose as needed.  - Hbg electrophoresis added.  - Varicella added to labs d/t unsure history.  - Pap up to date- done 7/12/2017.  - Needs GC/CT at NOB.  - Desires 1st trimester screening and level 2 ultrasound, MFM referral  placed.     Pt to RTO for NOB visit in 2 weeks and prn if questions or concerns    KELSEY Mahajan, PADMINI

## 2019-09-03 NOTE — LETTER
9/3/2019       RE: Evelia Jansen  [address]  [place] [address]     Dear Colleague,    Thank you for referring your patient, Evelia Jansen, to the [facility]. Please see a copy of my visit note below.    [facility] OB Intake note  Subjective   29 year old woman presents to clinic for initiation of OB care. No LMP recorded. Patient is pregnant.  at 9w6d by Estimated Date of Delivery: 2020 based on dating ultrasound. Reviewed dating ultrasound. Pregnancy is planned.      Symptoms since LMP include nausea, emesis in the PM. Patient has tried these relief measures: diet modification, small frequent meals, increased rest and increased fluids.    - Genetic/Infection questionnaire completed, risks include: hbg electrophoresis added.   Pt  does not have a recent known exposure to Parvo or CMV so IgG/IgM testing WILL NOT be ordered.   Have you traveled during the pregnancy?No  Have your sexual partner(s) travelled during the pregnancy?No    OTHER:  - History of gestational diabetes   - took postpartum 2 hour glucose test- failed 1/3 values  - Subclinical hypothyroidism- has been on 25mcg of synthroid since 2019. Currently taking, but ran out. Needs refill  -     - Current Medications    Current Outpatient Medications   Medication Sig Dispense Refill     levothyroxine (SYNTHROID/LEVOTHROID) 25 MCG tablet Take 1 tablet (25 mcg) by mouth daily 30 tablet 0     Prenatal Vit-Fe Fumarate-FA (PRENATAL VITAMIN) 27-0.8 MG TABS        acetone, Urine, test (RELION KETONE TEST) STRP Use with first morning urine daily (Patient not taking: Reported on 2018) 25 each 11     blood glucose monitoring (ACCU-CHEK FASTCLIX) lancets Use to test blood sugar 2 times a week. (Patient not taking: Reported on 2018) 25 each 3     blood glucose monitoring (ACCU-CHEK FASTCLIX) lancets Use to test blood sugar 4 times daily or as directed. (Patient not taking:  "Reported on 2018) 102 each 11     blood glucose monitoring (ACCU-CHEK GUIDE) test strip Use to test blood sugar 2 times a week (Patient not taking: Reported on 2018) 25 strip 3         - Co-morbids  Past Medical History:   Diagnosis Date     History of gestational diabetes      Hypothyroidism 2017    25mcg levothyroxine now     - Risk for GDM -  does  have Personal history of GDM   WILL have an early GCT and Hgb A1C    - High Risk for Pre E- meets one of following criteria The patient does not h/o Pre Eclampsia, Current multi fetal gestation, Pre Gestational Diabetes (Type 1 or Type 2), chronic hypertension, renal disease, Autoimmune disease (systematic lupus erythematosus, antiphospholipid syndrome) so WILL NOT start low dose aspirin (81mg) starting between 12 and 28 weeks to prevent early onset preeclampsia.    - Moderate risk - meets more than one of several moderate risk facrtors for Pre E - Nulliparity, Obesity (body mass index >30 kg/m2),Family history of preeclampsia (mother or sister), Sociodemographic characteristics ( race, low socioeconomic status), Age ?35 years, Personal history factors (e.g., low birthweight or small for gestational age, previous adverse pregnancy outcome, >10-year pregnancy interval)  so WILL NOT consider starting low dose aspirin (81mg) starting between 12 and 28 weeks to prevent early onset preeclampsia.    - The patient  does not have a history of spontaneous  birth so  WILL NOT consider progesterone starting at 16-20 weeks and/or serial transvaginal cervical length ultrasounds from 16-24 weeks.     -The patient does not have a history of immunosuppresion or HIV so Toxoplasma IgG/IgM WILL NOT be ordered.     PERSONAL/SOCIAL HISTORY  -  - \"Paige Lie\"  Both getting phDs at the [facility] .  History of anxiety or depression  :none- if Yes, therapy/medication/hospitalization: none  Additional items: Denies past or present domestic " violence, sexual and psychological abuse.    Objective  -VS: reviewed and within normal limits   -General appearance: no acute distress, patient is comfortable   NEUROLOGICAL/PSYCHIATRIC   - Orientated x3,   -Mood and affect: : normal     Assessment/Plan  Altamirano was seen today for prenatal care.    Diagnoses and all orders for this visit:    Supervision of high risk pregnancy in first trimester  -     [facility] FETAL MED CTR REFERRAL-PREGNANCY  -     ABO/Rh Type and Screen  -     CBC with Platelets  -     HIV Antigen Antibody Combo  -     Hepatitis B Surface Antibody  -     Hepatitis B Surface Antigen  -     Varicella Zoster Virus Antibody IgG  -     Hepatitis C antibody  -     Treponema Abs w Reflex to RPR and Titer  -     25- OH-Vitamin D  -     Hgb with reflex to ELP or RBC Solubility (Sickle Cell Screen)  -     Hgb A1c  -     TSH with free T4 reflex  -     Urine Culture Aerobic Bacterial  -     Rubella Antibody IgG Quantitative    Subclinical hypothyroidism  -     levothyroxine (SYNTHROID/LEVOTHROID) 25 MCG tablet; Take 1 tablet (25 mcg) by mouth daily    Encounter for (NT) nuchal translucency scan  -     [facility] FETAL MED CTR REFERRAL-PREGNANCY    Encounter for fetal anatomic survey  -     [facility]ET[facility] REFERRAL-PREGNANCY    History of gestational diabetes        29 year old  9w6d weeks of pregnancy with JOHNATHAN of 2020 by LMP of No LMP recorded. Patient is pregnant.. Ultrasound confirms.   Outpatient Encounter Medications as of 9/3/2019   Medication Sig Dispense Refill     levothyroxine (SYNTHROID/LEVOTHROID) 25 MCG tablet Take 1 tablet (25 mcg) by mouth daily 30 tablet 0     Prenatal Vit-Fe Fumarate-FA (PRENATAL VITAMIN) 27-0.8 MG TABS        acetone, Urine, test (RELION KETONE TEST) STRP Use with first morning urine daily (Patient not taking: Reported on 2018) 25 each 11     blood glucose monitoring (ACCU-CHEK FASTCLIX) lancets Use to test blood sugar 2 times a week. (Patient not taking: Reported on  9/14/2018) 25 each 3     blood glucose monitoring (ACCU-CHEK FASTCLIX) lancets Use to test blood sugar 4 times daily or as directed. (Patient not taking: Reported on 9/14/2018) 102 each 11     blood glucose monitoring (ACCU-CHEK GUIDE) test strip Use to test blood sugar 2 times a week (Patient not taking: Reported on 9/14/2018) 25 strip 3     [DISCONTINUED] levothyroxine (SYNTHROID/LEVOTHROID) 25 MCG tablet Take 1 tablet (25 mcg) by mouth daily 90 tablet 3     No facility-administered encounter medications on file as of 9/3/2019.     No orders of the defined types were placed in this encounter.                Orders Placed This Encounter   Procedures     CBC with Platelets     HIV Antigen Antibody Combo     Hepatitis B Surface Antibody     Hepatitis B Surface Antigen     Varicella Zoster Virus Antibody IgG     Hepatitis C antibody     Treponema Abs w Reflex to RPR and Titer     25- OH-Vitamin D     Hgb with reflex to ELP or RBC Solubility (Sickle Cell Screen)     Hgb A1c     TSH with free T4 reflex     Rubella Antibody IgG Quantitative     [facility] REFERRAL-PREGNANCY     ABO/Rh Type and Screen     - Oriented to Practice, types of care, and how to reach a provider.   - Patient received 1st trimester new OB education packet complete with aide of The Expectant Family booklet including information on genetic screening test options.  - Patient desires 1st trimester screening and desires level II ultrasound which were ordered.  - Educational handout on the prevention of infections diseases during pregnancy provided.  - Patient was encouraged to start prenatal vitamins as tolerated.      - Pregnancy concerns to be addressed by provider at new OB exam include: needs 1 hour glucose test, review thyroid labs and synthroid dosing, needs gc/ct at NOB.    - OBI education reviewed.  - OBI labs ordered.  - Reviewed risk for diabetes d/t hx of GDM last pregnancy. Hbga1c added to labs.   Needs 1 hour glucose at NOB.  -  Thyroid labs ordered. Refill sent for 25mcg synthroid. Adjust dose as needed.  - Hbg electrophoresis added.  - Varicella added to labs d/t unsure history.  - Pap up to date- done 7/12/2017.  - Needs GC/CT at NOB.  - Desires 1st trimester screening and level 2 ultrasound, MFM referral placed.     Pt to RTO for NOB visit in 2 weeks and prn if questions or concerns    KELSEY Mahajan, CNM

## 2019-09-04 LAB
BACTERIA SPEC CULT: NORMAL
DEPRECATED CALCIDIOL+CALCIFEROL SERPL-MC: 39 UG/L (ref 20–75)
HBV SURFACE AB SERPL IA-ACNC: 288.32 M[IU]/ML
HBV SURFACE AG SERPL QL IA: NONREACTIVE
HCV AB SERPL QL IA: NONREACTIVE
HIV 1+2 AB+HIV1 P24 AG SERPL QL IA: NONREACTIVE
Lab: NORMAL
RUBV IGG SERPL IA-ACNC: 86 IU/ML
SPECIMEN SOURCE: NORMAL
T PALLIDUM AB SER QL: NONREACTIVE
VZV IGG SER QL IA: 5 AI (ref 0–0.8)

## 2019-09-05 LAB
HGB A1 MFR BLD: 96.6 % (ref 95–97.9)
HGB A2 MFR BLD: 3 % (ref 2–3.5)
HGB C MFR BLD: 0 % (ref 0–0)
HGB E MFR BLD: 0 % (ref 0–0)
HGB F MFR BLD: 0.4 % (ref 0–2.1)
HGB FRACT BLD ELPH-IMP: NORMAL
HGB OTHER MFR BLD: 0 % (ref 0–0)
HGB S BLD QL SOLY: NORMAL
HGB S MFR BLD: 0 % (ref 0–0)
PATH INTERP BLD-IMP: NORMAL

## 2019-09-15 PROBLEM — Z31.69 PRE-CONCEPTION COUNSELING: Status: RESOLVED | Noted: 2019-02-20 | Resolved: 2019-09-15

## 2019-09-15 PROBLEM — R82.71 GBS BACTERIURIA: Status: RESOLVED | Noted: 2017-07-03 | Resolved: 2019-09-15

## 2019-09-15 PROBLEM — O09.91 SUPERVISION OF HIGH RISK PREGNANCY IN FIRST TRIMESTER: Status: ACTIVE | Noted: 2019-09-15

## 2019-09-16 ENCOUNTER — TELEPHONE (OUTPATIENT)
Dept: OBGYN | Facility: CLINIC | Age: 29
End: 2019-09-16

## 2019-09-16 NOTE — TELEPHONE ENCOUNTER
Called back to Altamirano who didn't leave message on nurse line,got vm, asked her to call triage and ok to leave detailed message.

## 2019-09-17 ENCOUNTER — TELEPHONE (OUTPATIENT)
Dept: OBGYN | Facility: CLINIC | Age: 29
End: 2019-09-17

## 2019-09-18 ENCOUNTER — OFFICE VISIT (OUTPATIENT)
Dept: OBGYN | Facility: CLINIC | Age: 29
End: 2019-09-18
Attending: ADVANCED PRACTICE MIDWIFE
Payer: COMMERCIAL

## 2019-09-18 VITALS
BODY MASS INDEX: 18.78 KG/M2 | DIASTOLIC BLOOD PRESSURE: 59 MMHG | HEIGHT: 64 IN | SYSTOLIC BLOOD PRESSURE: 91 MMHG | WEIGHT: 110 LBS | HEART RATE: 75 BPM

## 2019-09-18 DIAGNOSIS — O26.859 SPOTTING IN PREGNANCY: Primary | ICD-10-CM

## 2019-09-18 LAB
CLUE CELLS: NEGATIVE
TRICHOMONAS (WET PREP): NEGATIVE
YEAST (WET PREP): NEGATIVE

## 2019-09-18 PROCEDURE — G0463 HOSPITAL OUTPT CLINIC VISIT: HCPCS | Mod: ZF

## 2019-09-18 PROCEDURE — 87491 CHLMYD TRACH DNA AMP PROBE: CPT | Performed by: ADVANCED PRACTICE MIDWIFE

## 2019-09-18 PROCEDURE — 87591 N.GONORRHOEAE DNA AMP PROB: CPT | Performed by: ADVANCED PRACTICE MIDWIFE

## 2019-09-18 PROCEDURE — 87210 SMEAR WET MOUNT SALINE/INK: CPT | Mod: ZF | Performed by: ADVANCED PRACTICE MIDWIFE

## 2019-09-18 ASSESSMENT — ANXIETY QUESTIONNAIRES
3. WORRYING TOO MUCH ABOUT DIFFERENT THINGS: NOT AT ALL
5. BEING SO RESTLESS THAT IT IS HARD TO SIT STILL: NOT AT ALL
6. BECOMING EASILY ANNOYED OR IRRITABLE: NOT AT ALL
7. FEELING AFRAID AS IF SOMETHING AWFUL MIGHT HAPPEN: NOT AT ALL
GAD7 TOTAL SCORE: 0
1. FEELING NERVOUS, ANXIOUS, OR ON EDGE: NOT AT ALL
2. NOT BEING ABLE TO STOP OR CONTROL WORRYING: NOT AT ALL

## 2019-09-18 ASSESSMENT — MIFFLIN-ST. JEOR: SCORE: 1208.96

## 2019-09-18 ASSESSMENT — PATIENT HEALTH QUESTIONNAIRE - PHQ9: 5. POOR APPETITE OR OVEREATING: NOT AT ALL

## 2019-09-18 NOTE — PROGRESS NOTES
"Subjective:      29 year old  at 12w0d presents for a problem prenatal appointment with her partner.   + vaginal bleeding - has been on/off since August.  Having daily or every other day brown spotting.  Sometimes just when she wipes, other times she wears a panty liner.  No leakage of fluid.   No itching, or irritation.    Not having sex.  No malodor.      - Having some discomfort when touching abdomin ~ just above umbilicus ~3cm from midline bilaterally.  No ovarian cysts noted on dating US.   no constipation or diarrhea.  no burning with urination, no hesitancy, no frequency.   Had this previously after last pregnancy - not worse after eating, no bulges.     - Pap NIL , nor history of abnormal.         Objective:  Vitals:    19 0907   BP: 91/59   BP Location: Left arm   Patient Position: Chair   Pulse: 75   Weight: 49.9 kg (110 lb)   Height: 1.626 m (5' 4\")     See OB flowsheet    SSE - cervix appears long, closed. No lesions, bleeding or discharge.   Vagina pink, moist without lesions, discharge, or bleeding.     Wet prep - negative clue, negative Trich, Negative Yeast. Negative KOH whiff.     Assessment/Plan     Encounter Diagnosis   Name Primary?     Spotting in pregnancy Yes     Orders Placed This Encounter   Procedures     Wet Prep POCT     (O26.859) Spotting in pregnancy  (primary encounter diagnosis)  Plan: Wet Prep POCT, Neisseria gonorrhoeae PCR,         Chlamydia PCR (Clinic Collect)              - Reviewed why/how to contact provider.   - Patient education/orders or handouts today:PTL signs/symptoms   Return to clinic 19 as scheduled for NT and New OB exam  and prn if questions or concerns.   Karina Banegas, KELSEY [place]                "

## 2019-09-18 NOTE — LETTER
"2019       RE: Evelia Jansen  [address]  [place] [address]     Dear Colleague,    Thank you for referring your patient, Evelia Jansen, to the [facility] at [facility]. Please see a copy of my visit note below.    Subjective:      29 year old  at 12w0d presents for a problem prenatal appointment with her partner.   + vaginal bleeding - has been on/off since August.  Having daily or every other day brown spotting.  Sometimes just when she wipes, other times she wears a panty liner.  No leakage of fluid.   No itching, or irritation.    Not having sex.  No malodor.      - Having some discomfort when touching abdomin ~ just above umbilicus ~3cm from midline bilaterally.  No ovarian cysts noted on dating US.   no constipation or diarrhea.  no burning with urination, no hesitancy, no frequency.   Had this previously after last pregnancy - not worse after eating, no bulges.     - Pap NIL , nor history of abnormal.      Objective:  Vitals:    19 0907   BP: 91/59   BP Location: Left arm   Patient Position: Chair   Pulse: 75   Weight: 49.9 kg (110 lb)   Height: 1.626 m (5' 4\")   See OB flowsheet    SSE - cervix appears long, closed. No lesions, bleeding or discharge.   Vagina pink, moist without lesions, discharge, or bleeding.     Wet prep - negative clue, negative Trich, Negative Yeast. Negative KOH whiff.     Assessment/Plan  Encounter Diagnosis   Name Primary?     Spotting in pregnancy Yes     Orders Placed This Encounter   Procedures     Wet Prep POCT     (O26.859) Spotting in pregnancy  (primary encounter diagnosis)  Plan: Wet Prep POCT, Neisseria gonorrhoeae PCR,         Chlamydia PCR (Clinic Collect)  - Reviewed why/how to contact provider.   - Patient education/orders or handouts today:PTL signs/symptoms   Return to clinic 19 as scheduled for NT and New OB exam  and prn if questions or concerns.       Karina Banegas, APRN " PADMINI                  Again, thank you for allowing me to participate in the care of your patient.      Sincerely,    KELSEY De Guzman CNM

## 2019-09-19 LAB
C TRACH DNA SPEC QL NAA+PROBE: NEGATIVE
N GONORRHOEA DNA SPEC QL NAA+PROBE: NEGATIVE
SPECIMEN SOURCE: NORMAL
SPECIMEN SOURCE: NORMAL

## 2019-09-19 ASSESSMENT — ANXIETY QUESTIONNAIRES: GAD7 TOTAL SCORE: 0

## 2019-09-25 ENCOUNTER — PRE VISIT (OUTPATIENT)
Dept: MATERNAL FETAL MEDICINE | Facility: CLINIC | Age: 29
End: 2019-09-25

## 2019-09-27 ENCOUNTER — OFFICE VISIT (OUTPATIENT)
Dept: OBGYN | Facility: CLINIC | Age: 29
End: 2019-09-27
Attending: ADVANCED PRACTICE MIDWIFE
Payer: COMMERCIAL

## 2019-09-27 ENCOUNTER — HOSPITAL ENCOUNTER (OUTPATIENT)
Dept: ULTRASOUND IMAGING | Facility: CLINIC | Age: 29
Discharge: HOME | End: 2019-09-27
Attending: ADVANCED PRACTICE MIDWIFE | Admitting: ADVANCED PRACTICE MIDWIFE
Payer: COMMERCIAL

## 2019-09-27 ENCOUNTER — OFFICE VISIT (OUTPATIENT)
Dept: MATERNAL FETAL MEDICINE | Facility: CLINIC | Age: 29
End: 2019-09-27
Attending: ADVANCED PRACTICE MIDWIFE
Payer: COMMERCIAL

## 2019-09-27 VITALS
SYSTOLIC BLOOD PRESSURE: 99 MMHG | HEIGHT: 64 IN | BODY MASS INDEX: 18.95 KG/M2 | DIASTOLIC BLOOD PRESSURE: 64 MMHG | HEART RATE: 78 BPM | WEIGHT: 111 LBS

## 2019-09-27 DIAGNOSIS — O09.91 SUPERVISION OF HIGH RISK PREGNANCY IN FIRST TRIMESTER: ICD-10-CM

## 2019-09-27 DIAGNOSIS — Z36.82 NUCHAL TRANSLUCENCY OF FETUS ON PRENATAL ULTRASOUND: Primary | ICD-10-CM

## 2019-09-27 DIAGNOSIS — Z36.9 FIRST TRIMESTER SCREENING: Primary | ICD-10-CM

## 2019-09-27 DIAGNOSIS — O26.90 PREGNANCY RELATED CONDITION, ANTEPARTUM: ICD-10-CM

## 2019-09-27 DIAGNOSIS — Z34.01 ENCOUNTER FOR SUPERVISION OF NORMAL FIRST PREGNANCY IN FIRST TRIMESTER: Primary | ICD-10-CM

## 2019-09-27 LAB — GLUCOSE 1H P 50 G GLC PO SERPL-MCNC: 112 MG/DL (ref 60–129)

## 2019-09-27 PROCEDURE — G0463 HOSPITAL OUTPT CLINIC VISIT: HCPCS | Mod: ZF

## 2019-09-27 PROCEDURE — 84704 HCG FREE BETACHAIN TEST: CPT | Performed by: OBSTETRICS & GYNECOLOGY

## 2019-09-27 PROCEDURE — 82105 ALPHA-FETOPROTEIN SERUM: CPT | Performed by: OBSTETRICS & GYNECOLOGY

## 2019-09-27 PROCEDURE — 82950 GLUCOSE TEST: CPT | Performed by: ADVANCED PRACTICE MIDWIFE

## 2019-09-27 PROCEDURE — 76813 OB US NUCHAL MEAS 1 GEST: CPT

## 2019-09-27 PROCEDURE — 36415 COLL VENOUS BLD VENIPUNCTURE: CPT | Performed by: ADVANCED PRACTICE MIDWIFE

## 2019-09-27 PROCEDURE — 87491 CHLMYD TRACH DNA AMP PROBE: CPT | Performed by: ADVANCED PRACTICE MIDWIFE

## 2019-09-27 PROCEDURE — 96040 ZZH GENETIC COUNSELING, EACH 30 MINUTES: CPT | Mod: ZF | Performed by: GENETIC COUNSELOR, MS

## 2019-09-27 PROCEDURE — 84163 PAPPA SERUM: CPT | Performed by: OBSTETRICS & GYNECOLOGY

## 2019-09-27 PROCEDURE — 87591 N.GONORRHOEAE DNA AMP PROB: CPT | Performed by: ADVANCED PRACTICE MIDWIFE

## 2019-09-27 ASSESSMENT — ANXIETY QUESTIONNAIRES
7. FEELING AFRAID AS IF SOMETHING AWFUL MIGHT HAPPEN: NOT AT ALL
1. FEELING NERVOUS, ANXIOUS, OR ON EDGE: NOT AT ALL
GAD7 TOTAL SCORE: 0
3. WORRYING TOO MUCH ABOUT DIFFERENT THINGS: NOT AT ALL
2. NOT BEING ABLE TO STOP OR CONTROL WORRYING: NOT AT ALL
6. BECOMING EASILY ANNOYED OR IRRITABLE: NOT AT ALL
5. BEING SO RESTLESS THAT IT IS HARD TO SIT STILL: NOT AT ALL

## 2019-09-27 ASSESSMENT — MIFFLIN-ST. JEOR: SCORE: 1213.49

## 2019-09-27 ASSESSMENT — PATIENT HEALTH QUESTIONNAIRE - PHQ9: 5. POOR APPETITE OR OVEREATING: NOT AT ALL

## 2019-09-27 NOTE — PROGRESS NOTES
Please see full imaging report from [employer] program under imaging tab.    Nu Chavez MD  Maternal Fetal Medicine

## 2019-09-27 NOTE — LETTER
2019       RE: Evelia Jansen  [address]  [facility] [address]     Dear Colleague,    Thank you for referring your patient, Evelia Jansen, to the [facility] at [facility]. Please see a copy of my visit note below.    SUBJECTIVE:   Evelia is a 29 year old female who presents to clinic for a new OB visit.   at 13w1d with Estimated Date of Delivery: 2020 based on US confirms. Feels well. Has started PNV.     She has had bleeding since her LMP.  The bleeding  is brown, in small, on several occasions, and was not accompanied by cramping.  She has had mild nausea. Weight loss has not occurred.   This was a planned pregnancy.   FOB is involved,  Presents with her today they are   OTHER CONCERNS: no other concerns today.     ===========================================   ROS: 10 point ROS neg other than the symptoms noted above in the HPI.    PSYCHIATRIC:  Denies mood changes  PHQ-9 score:    PHQ-9 SCORE 2018   PHQ-9 Total Score 0     ALEX-7 SCORE 2018   Total Score - - -   Total Score 0 0 0     Past History:  Her past medical history   Past Medical History:   Diagnosis Date     History of gestational diabetes      Hypothyroidism 2017    25mcg levothyroxine now     She has a history of  gestational diabetes, diet controlled  Since her last LMP she denies use of alcohol, tobacco and street drugs.    HISTORY:  No family history on file.  Social History     Socioeconomic History     Marital status:      Spouse name: Paige Martinez     Number of children: 1     Years of education: Not on file     Highest education level: Not on file   Occupational History     Occupation: student     Comment: Teacher   Social Needs     Financial resource strain: Not on file     Food insecurity:     Worry: Not on file     Inability: Not on file     Transportation needs:     Medical: Not on file     Non-medical: Not on file   Tobacco  Use     Smoking status: Never Smoker     Smokeless tobacco: Never Used   Substance and Sexual Activity     Alcohol use: No     Drug use: No     Sexual activity: Yes     Partners: Male   Lifestyle     Physical activity:     Days per week: Not on file     Minutes per session: Not on file     Stress: Not on file   Relationships     Social connections:     Talks on phone: Not on file     Gets together: Not on file     Attends Pentecostalism service: Not on file     Active member of club or organization: Not on file     Attends meetings of clubs or organizations: Not on file     Relationship status: Not on file     Intimate partner violence:     Fear of current or ex partner: Not on file     Emotionally abused: Not on file     Physically abused: Not on file     Forced sexual activity: Not on file   Other Topics Concern     Not on file   Social History Narrative    How much exercise per week? none    How much calcium per day? food       How much caffeine per day? none    How much vitamin D per day? prenatals    Do you/your family wear seatbelts?  Yes    Do you/your family use safety helmets? No    Do you/your family use sunscreen? Yes    Do you/your family keep firearms in the home? No    Do you/your family have a smoke detector(s)? Yes        Donna Romeo CMA 6/29/17     Current Outpatient Medications   Medication Sig     levothyroxine (SYNTHROID/LEVOTHROID) 25 MCG tablet Take 1 tablet (25 mcg) by mouth daily     Prenatal Vit-Fe Fumarate-FA (PRENATAL VITAMIN) 27-0.8 MG TABS      acetone, Urine, test (RELION KETONE TEST) STRP Use with first morning urine daily (Patient not taking: Reported on 9/14/2018)     blood glucose monitoring (ACCU-CHEK FASTCLIX) lancets Use to test blood sugar 2 times a week. (Patient not taking: Reported on 9/14/2018)     blood glucose monitoring (ACCU-CHEK FASTCLIX) lancets Use to test blood sugar 4 times daily or as directed. (Patient not taking: Reported on 9/14/2018)     blood glucose  "monitoring (ACCU-CHEK GUIDE) test strip Use to test blood sugar 2 times a week (Patient not taking: Reported on 2018)     No current facility-administered medications for this visit.      No Known Allergies    ============================================  MEDICAL HISTORY  Past Medical History:   Diagnosis Date     History of gestational diabetes      Hypothyroidism 2017    25mcg levothyroxine now     Past Surgical History:   Procedure Laterality Date     tooth extraction  2019       OB History    Para Term  AB Living   2 1 1 0 0 1   SAB TAB Ectopic Multiple Live Births   0 0 0 0 1      # Outcome Date GA Lbr Nicho/2nd Weight Sex Delivery Anes PTL Lv   2 Current            1 Term 18 37w5d 01:20 / 00:51 2.92 kg (6 lb 7 oz) F Vag-Spont EPI N MARTHA      Complications: GBS      Name: YOLY VILA ESTRADA      Apgar1: 9  Apgar5: 9     GYN History-  Abnormal Pap Smears: no                        Cervical procedures: no                        History of STI: no    I personally reviewed the past social/family/medical and surgical history on the date of service.   I reviewed lab work done at Intake visit with patient.    EXAM:  BP 99/64 (BP Location: Left arm, Patient Position: Chair)   Pulse 78   Ht 1.626 m (5' 4\")   Wt 50.3 kg (111 lb)   BMI 19.05 kg/m      EXAM:  GENERAL:  Pleasant pregnant female, alert, cooperative and well groomed.  SKIN:  Warm and dry, without lesions or rashes  HEAD: Symmetrical features.  MOUTH:  Buccal mucosa pink, moist without lesions.  Teeth in good repair.    NECK:  Thyroid without enlargement and nodules.  Lymph nodes not palpable.   LUNGS:  Clear to auscultation.  BREAST:    No dominant, fixed or suspicious masses are noted.  No skin or nipple changes or axillary nodes.   Nipples everted.      HEART:  RRR without murmur.  ABDOMEN: Soft without masses , tenderness or organomegaly.  No CVA tenderness.  Uterus palpable at size equal to dates.  No scars noted.. Fetal heart " tones present.  MUSCULOSKELETAL:  Full range of motion  EXTREMITIES:  No edema. No significant varicosities.   PELVIC EXAM: deferred  WET PREP:Not done  GC/CHLAMYDIA CULTURE OBTAINED:YES    Lab Results   Component Value Date    PAP NIL 2017      ASSESSMENT:  29 year old , 13w1d weeks of pregnancy with JOHNATHAN of 2020 by US confirms  Intrauterine pregnancy 13w1d size is consistent with dates  Genetic Screening: CVS/Verify/Genetic Amniocentesis    ICD-10-CM    1. Encounter for supervision of normal first pregnancy in first trimester Z34.01    2. Supervision of high risk pregnancy in first trimester O09.91 Glucose 1 Hour     Chlamydia PCR (Clinic Collect)     Gonorrhea PCR     PLAN:  - Reviewed use of triage nurse line and contacting the on-call provider after hours for an urgent need such as fever, vagina bleeding, bladder or vaginal infection, rupture of membranes,  or term labor.    - Reviewed best evidence for: weight gain for her weight and height for pregnancy:  Based on pre-pregnancy weight of 108 and Body mass index is 19.05 kg/m . RECOMMENDED WEIGHT GAIN: 25-35 lbs.  - Reviewed healthy diet and foods to avoid; exercise and activity during pregnancy; avoiding exposure to toxoplasmosis; and maintenance of a generally healthy lifestyle.   - Discussed the harms, benefits, side effects and alternative therapies for current prescribed and OTC medications.  - Reviewed high risk for gestational diabetes, early 1 hour completed today.  - All pt's and partner's questions discussed and answered.  Pt verbalized understanding of and agreement to plan of care.   - Continue scheduled prenatal care and prn if questions or concerns  - Come back on  after appt with NATALI Robert CNM

## 2019-09-27 NOTE — PROGRESS NOTES
SUBJECTIVE:   Evelia is a 29 year old female who presents to clinic for a new OB visit.   at 13w1d with Estimated Date of Delivery: 2020 based on US confirms. Feels well. Has started PNV.     She has had bleeding since her LMP.  The bleeding  is brown, in small, on several occasions, and was not accompanied by cramping.  She has had mild nausea. Weight loss has not occurred.   This was a planned pregnancy.   FOB is involved,  Presents with her today they are   OTHER CONCERNS: no other concerns today.     ===========================================   ROS: 10 point ROS neg other than the symptoms noted above in the HPI.    PSYCHIATRIC:  Denies mood changes  PHQ-9 score:    PHQ-9 SCORE 2018   PHQ-9 Total Score 0     ALEX-7 SCORE 2018   Total Score - - -   Total Score 0 0 0     Past History:  Her past medical history   Past Medical History:   Diagnosis Date     History of gestational diabetes      Hypothyroidism 2017    25mcg levothyroxine now     She has a history of  gestational diabetes, diet controlled  Since her last LMP she denies use of alcohol, tobacco and street drugs.    HISTORY:  No family history on file.  Social History     Socioeconomic History     Marital status:      Spouse name: Paige Martinez     Number of children: 1     Years of education: Not on file     Highest education level: Not on file   Occupational History     Occupation: student     Comment: Teacher   Social Needs     Financial resource strain: Not on file     Food insecurity:     Worry: Not on file     Inability: Not on file     Transportation needs:     Medical: Not on file     Non-medical: Not on file   Tobacco Use     Smoking status: Never Smoker     Smokeless tobacco: Never Used   Substance and Sexual Activity     Alcohol use: No     Drug use: No     Sexual activity: Yes     Partners: Male   Lifestyle     Physical activity:     Days per week: Not on file     Minutes per session: Not on  file     Stress: Not on file   Relationships     Social connections:     Talks on phone: Not on file     Gets together: Not on file     Attends Yazidism service: Not on file     Active member of club or organization: Not on file     Attends meetings of clubs or organizations: Not on file     Relationship status: Not on file     Intimate partner violence:     Fear of current or ex partner: Not on file     Emotionally abused: Not on file     Physically abused: Not on file     Forced sexual activity: Not on file   Other Topics Concern     Not on file   Social History Narrative    How much exercise per week? none    How much calcium per day? food       How much caffeine per day? none    How much vitamin D per day? prenatals    Do you/your family wear seatbelts?  Yes    Do you/your family use safety helmets? No    Do you/your family use sunscreen? Yes    Do you/your family keep firearms in the home? No    Do you/your family have a smoke detector(s)? Yes        Donna Romeo CMA 6/29/17     Current Outpatient Medications   Medication Sig     levothyroxine (SYNTHROID/LEVOTHROID) 25 MCG tablet Take 1 tablet (25 mcg) by mouth daily     Prenatal Vit-Fe Fumarate-FA (PRENATAL VITAMIN) 27-0.8 MG TABS      acetone, Urine, test (RELION KETONE TEST) STRP Use with first morning urine daily (Patient not taking: Reported on 9/14/2018)     blood glucose monitoring (ACCU-CHEK FASTCLIX) lancets Use to test blood sugar 2 times a week. (Patient not taking: Reported on 9/14/2018)     blood glucose monitoring (ACCU-CHEK FASTCLIX) lancets Use to test blood sugar 4 times daily or as directed. (Patient not taking: Reported on 9/14/2018)     blood glucose monitoring (ACCU-CHEK GUIDE) test strip Use to test blood sugar 2 times a week (Patient not taking: Reported on 9/14/2018)     No current facility-administered medications for this visit.      No Known Allergies    ============================================  MEDICAL HISTORY  Past  "Medical History:   Diagnosis Date     History of gestational diabetes      Hypothyroidism 2017    25mcg levothyroxine now     Past Surgical History:   Procedure Laterality Date     tooth extraction  2019       OB History    Para Term  AB Living   2 1 1 0 0 1   SAB TAB Ectopic Multiple Live Births   0 0 0 0 1      # Outcome Date GA Lbr Nicho/2nd Weight Sex Delivery Anes PTL Lv   2 Current            1 Term 18 37w5d 01:20 / 00:51 2.92 kg (6 lb 7 oz) F Vag-Spont EPI N MARTHA      Complications: GBS      Name: YOLY VILA ESTRADA      Apgar1: 9  Apgar5: 9     GYN History-  Abnormal Pap Smears: no                        Cervical procedures: no                        History of STI: no    I personally reviewed the past social/family/medical and surgical history on the date of service.   I reviewed lab work done at Intake visit with patient.    EXAM:  BP 99/64 (BP Location: Left arm, Patient Position: Chair)   Pulse 78   Ht 1.626 m (5' 4\")   Wt 50.3 kg (111 lb)   BMI 19.05 kg/m     EXAM:  GENERAL:  Pleasant pregnant female, alert, cooperative and well groomed.  SKIN:  Warm and dry, without lesions or rashes  HEAD: Symmetrical features.  MOUTH:  Buccal mucosa pink, moist without lesions.  Teeth in good repair.    NECK:  Thyroid without enlargement and nodules.  Lymph nodes not palpable.   LUNGS:  Clear to auscultation.  BREAST:    No dominant, fixed or suspicious masses are noted.  No skin or nipple changes or axillary nodes.   Nipples everted.      HEART:  RRR without murmur.  ABDOMEN: Soft without masses , tenderness or organomegaly.  No CVA tenderness.  Uterus palpable at size equal to dates.  No scars noted.. Fetal heart tones present.  MUSCULOSKELETAL:  Full range of motion  EXTREMITIES:  No edema. No significant varicosities.   PELVIC EXAM: deferred  WET PREP:Not done  GC/CHLAMYDIA CULTURE OBTAINED:YES    Lab Results   Component Value Date    PAP NIL 2017      ASSESSMENT:  29 year old " , 13w1d weeks of pregnancy with JOHNATHAN of 2020 by US confirms  Intrauterine pregnancy 13w1d size is consistent with dates  Genetic Screening: CVS/Verify/Genetic Amniocentesis    ICD-10-CM    1. Encounter for supervision of normal first pregnancy in first trimester Z34.01    2. Supervision of high risk pregnancy in first trimester O09.91 Glucose 1 Hour     Chlamydia PCR (Clinic Collect)     Gonorrhea PCR     PLAN:  - Reviewed use of triage nurse line and contacting the on-call provider after hours for an urgent need such as fever, vagina bleeding, bladder or vaginal infection, rupture of membranes,  or term labor.    - Reviewed best evidence for: weight gain for her weight and height for pregnancy:  Based on pre-pregnancy weight of 108 and Body mass index is 19.05 kg/m . RECOMMENDED WEIGHT GAIN: 25-35 lbs.  - Reviewed healthy diet and foods to avoid; exercise and activity during pregnancy; avoiding exposure to toxoplasmosis; and maintenance of a generally healthy lifestyle.   - Discussed the harms, benefits, side effects and alternative therapies for current prescribed and OTC medications.  - Reviewed high risk for gestational diabetes, early 1 hour completed today.  - All pt's and partner's questions discussed and answered.  Pt verbalized understanding of and agreement to plan of care.   - Continue scheduled prenatal care and prn if questions or concerns  - Come back on  after appt with NATALI Robert CNM

## 2019-09-27 NOTE — PROGRESS NOTES
[facility]  Genetic Counseling Consult    Patient: Evelia Jansen YOB: 1990   Date of Service: 19      Evelia Jansen was seen with her , Paige, at [facility] for genetic consultation to discuss the options for routine screening for fetal chromosome abnormalities.       Impression/Plan:   1.  Evelia had an ultrasound and blood draw for first trimester screening.  Results are expected within 3-5 days, and will be available in [facility].  We will contact her at [phone] to discuss the results, and a copy will be forwarded to the office of the referring OB provider. Evelia provided verbal permission to leave detailed results in a voicemail if she is unavailable.     2. Maternal serum AFP (single marker screen) is recommended after 15 weeks to screen for open neural tube defects. A quad screen should not be performed.    Pregnancy History:   /Parity:    Age at Delivery: 29 year old  JOHNATHAN: 2020, by Ultrasound  Gestational Age: 13w2d    No significant complications or exposures were reported in the current pregnancy.    Evelia s pregnancy history is significant for a 37w5d  in 2018.    Medical History:   Evelia francisco reported medical history is not expected to impact pregnancy management or risks to fetal development.       Family History:   A three-generation pedigree was obtained in a previous pregnancy, was updated today, and is scanned under the  Media  tab. No new significant findings were reported by Evelia or Paige today.     The following significant findings were reported by Evelia and Paige previously:    Paige's father  at 36 yrs of liver cancer.  No other family members were reported with cancer.  Paige did not believe that his father had risk factors.  We reviewed that his doctor should be made aware of this family history.    Otherwise, the reported family history is negative for multiple miscarriages, stillbirths, birth  defects, mental retardation, known genetic conditions, and consanguinity.       Carrier Screening:   The patient reports that she and the father of the pregnancy have  ancestry:      The hemoglobinopathies are a group of genetic blood diseases that occur with increased frequency in individuals of  ancestry and carrier screening for these conditions is available.  Carrier screening for the hemoglobinopathies includes a CBC with red blood cell indices, a ferritin level, and a quantitative hemoglobin electrophoresis or HPLC.  In addition,  screening in the [facility] includes many of the hemoglobinopathies.      Expanded carrier screening for mutations in a large panel of genes associated with autosomal recessive conditions including cystic fibrosis, spinal muscular atrophy, and others, is now available.      The patient has had previous carrier screening for hemoglobinopathies, the results of which were normal.  A copy of the report was available for our review today.       Risk Assessment for Chromosome Conditions:   We explained that the risk for fetal chromosome abnormalities increases with maternal age. We discussed specific features of common chromosome abnormalities, including Down syndrome, trisomy 13, trisomy 18, and sex chromosome trisomies.      - At age 29 at midtrimester, the risk to have a baby with Down syndrome is 1 in 760.     - At age 29 at midtrimester, the risk to have a baby with any chromosome abnormality is 1 in 380.          Testing Options:   We discussed the following options:   First trimester screening    First trimester ultrasound with nuchal translucency and nasal bone assessments, maternal plasma hCG, ITALIA-A, and AFP measurement    Screens for fetal trisomy 21, trisomy 13, and trisomy 18    Cannot screen for open neural tube defects; maternal serum AFP after 15 weeks is recommended       Non-invasive Prenatal Testing (NIPT)    Maternal plasma cell-free DNA  testing; first trimester ultrasound with nuchal translucency and nasal bone assessment is recommended, when appropriate    Screens for fetal trisomy 21, trisomy 13, trisomy 18, and sex chromosome aneuploidy    Cannot screen for open neural tube defects; maternal serum AFP after 15 weeks is recommended       Genetic Amniocentesis    Invasive procedure typically performed in the second trimester by which amniotic fluid is obtained for the purpose of chromosome analysis and/or other prenatal genetic analysis    Diagnostic results; >99% sensitivity for fetal chromosome abnormalities    AFAFP measurement tests for open neural tube defects        We reviewed the benefits and limitations of this testing.  Screening tests provide a risk assessment specific to the pregnancy for certain fetal chromosome abnormalities, but cannot definitively diagnose or exclude a fetal chromosome abnormality.  Follow-up genetic counseling and consideration of diagnostic testing is recommended with any abnormal screening result.      It was a pleasure to be involved with Altamirano s care. Face-to-face time of the meeting was 35 minutes.    Salena Sandoval MS, [facility]  Maternal Fetal Medicine  [facility]  Ph: [phone]  [email or web address]

## 2019-09-28 ASSESSMENT — ANXIETY QUESTIONNAIRES: GAD7 TOTAL SCORE: 0

## 2019-10-02 ENCOUNTER — TELEPHONE (OUTPATIENT)
Dept: MATERNAL FETAL MEDICINE | Facility: CLINIC | Age: 29
End: 2019-10-02

## 2019-10-02 LAB — LAB SCANNED RESULT: NORMAL

## 2019-10-02 NOTE — TELEPHONE ENCOUNTER
Left a message for Altamirano regarding her first trimester screening results.     These results indicated a 1 in >10,000 risk for Down syndrome and a 1 in >10,000 risk for trisomy 18/13.     The NT measurement was 1.8 mm, the nasal bone was present, the ITALIA-A was 1.19 MoM and the free BhCG was 1.45 MoM.    This screening test gives information about the risk of some chromosome conditions in a pregnancy, but does not definitively diagnose or exclude the presence of these chromosome conditions.     A copy of these results are available in her EPIC chart for her primary OB to review.    Maternal serum AFP only is recommended in the second trimester to screen for neural tube defects.    Salena Sandoval MS, [facility]  Maternal Fetal Medicine  [facility]  Ph: [phone]  [email or web address]

## 2019-11-05 ENCOUNTER — HOSPITAL ENCOUNTER (OUTPATIENT)
Dept: ULTRASOUND IMAGING | Facility: CLINIC | Age: 29
Discharge: HOME | End: 2019-11-05
Attending: ADVANCED PRACTICE MIDWIFE | Admitting: ADVANCED PRACTICE MIDWIFE
Payer: COMMERCIAL

## 2019-11-05 ENCOUNTER — OFFICE VISIT (OUTPATIENT)
Dept: MATERNAL FETAL MEDICINE | Facility: CLINIC | Age: 29
End: 2019-11-05
Attending: ADVANCED PRACTICE MIDWIFE
Payer: COMMERCIAL

## 2019-11-05 ENCOUNTER — OFFICE VISIT (OUTPATIENT)
Dept: OBGYN | Facility: CLINIC | Age: 29
End: 2019-11-05
Attending: ADVANCED PRACTICE MIDWIFE
Payer: COMMERCIAL

## 2019-11-05 VITALS
WEIGHT: 118 LBS | HEART RATE: 71 BPM | HEIGHT: 64 IN | SYSTOLIC BLOOD PRESSURE: 99 MMHG | BODY MASS INDEX: 20.14 KG/M2 | DIASTOLIC BLOOD PRESSURE: 64 MMHG

## 2019-11-05 DIAGNOSIS — Z36.89 ENCOUNTER FOR FETAL ANATOMIC SURVEY: Primary | ICD-10-CM

## 2019-11-05 DIAGNOSIS — O26.90 PREGNANCY RELATED CONDITION, ANTEPARTUM: ICD-10-CM

## 2019-11-05 DIAGNOSIS — O09.92 HRP (HIGH RISK PREGNANCY), SECOND TRIMESTER: Primary | ICD-10-CM

## 2019-11-05 DIAGNOSIS — E03.8 SUBCLINICAL HYPOTHYROIDISM: ICD-10-CM

## 2019-11-05 DIAGNOSIS — O09.91 SUPERVISION OF HIGH RISK PREGNANCY IN FIRST TRIMESTER: ICD-10-CM

## 2019-11-05 DIAGNOSIS — Z86.32 HISTORY OF GESTATIONAL DIABETES: ICD-10-CM

## 2019-11-05 DIAGNOSIS — O43.199 MARGINAL INSERTION OF UMBILICAL CORD AFFECTING MANAGEMENT OF MOTHER: ICD-10-CM

## 2019-11-05 LAB
T4 FREE SERPL-MCNC: 0.91 NG/DL (ref 0.76–1.46)
TSH SERPL DL<=0.005 MIU/L-ACNC: 4.65 MU/L (ref 0.4–4)

## 2019-11-05 PROCEDURE — 84439 ASSAY OF FREE THYROXINE: CPT | Performed by: ADVANCED PRACTICE MIDWIFE

## 2019-11-05 PROCEDURE — 76811 OB US DETAILED SNGL FETUS: CPT

## 2019-11-05 PROCEDURE — 36415 COLL VENOUS BLD VENIPUNCTURE: CPT | Performed by: ADVANCED PRACTICE MIDWIFE

## 2019-11-05 PROCEDURE — 84443 ASSAY THYROID STIM HORMONE: CPT | Performed by: ADVANCED PRACTICE MIDWIFE

## 2019-11-05 PROCEDURE — G0463 HOSPITAL OUTPT CLINIC VISIT: HCPCS | Mod: ZF

## 2019-11-05 ASSESSMENT — MIFFLIN-ST. JEOR: SCORE: 1245.24

## 2019-11-05 NOTE — PROGRESS NOTES
"Subjective:      29 year old  at 18w6d presents for a routine prenatal appointment.       Denies vaginal bleeding or leakage of fluid.  Denies contractions or cramping.    Reports feeling regular fetal movement.       No HA, visual changes, RUQ or epigastric pain.   The patient presents with the following concerns: Some abdominal muscle discomfort-reviewed common discomforts of pregnancy.  Level II US: completed today, formal report pending.  Patient reports marginal cord insertion.  Awaiting formal report for follow up recommendations.  Patient consents to TSH today.       Objective:  Vitals:    19 1032   BP: 99/64   BP Location: Left arm   Patient Position: Chair   Pulse: 71   Weight: 53.5 kg (118 lb)   Height: 1.626 m (5' 4\")     See OB flowsheet    Assessment/Plan     Encounter Diagnoses   Name Primary?     HRP (high risk pregnancy), second trimester Yes     Supervision of high risk pregnancy in first trimester      History of gestational diabetes      Subclinical hypothyroidism            - Reviewed total weight gain, encouraged continued healthy diet and exercise.      - Reviewed why/how to contact provider.    Patient education/orders or handouts today:  PTL signs/symptoms and fetal movement   Return to clinic in 4 weeks and prn if questions or concerns.   KELSEY Yeboah CNM              "

## 2019-11-05 NOTE — LETTER
"2019       RE: Evelia Jansen  [address]  [facility] [address]     Dear Colleague,    Thank you for referring your patient, Evelia Jansen, to the [facility] at [facility]. Please see a copy of my visit note below.    Subjective:     29 year old  at 18w6d presents for a routine prenatal appointment.      Denies vaginal bleeding or leakage of fluid.  Denies contractions or cramping.   Reports feeling regular fetal movement.       No HA, visual changes, RUQ or epigastric pain.   The patient presents with the following concerns: Some abdominal muscle discomfort-reviewed common discomforts of pregnancy.  Level II US: completed today, formal report pending.  Patient reports marginal cord insertion.  Awaiting formal report for follow up recommendations.  Patient consents to TSH today.    Objective:  Vitals:    19 1032   BP: 99/64   BP Location: Left arm   Patient Position: Chair   Pulse: 71   Weight: 53.5 kg (118 lb)   Height: 1.626 m (5' 4\")   See OB flowsheet    Assessment/Plan  Encounter Diagnoses   Name Primary?     HRP (high risk pregnancy), second trimester Yes     Supervision of high risk pregnancy in first trimester      History of gestational diabetes      Subclinical hypothyroidism      - Reviewed total weight gain, encouraged continued healthy diet and exercise.      - Reviewed why/how to contact provider.   Patient education/orders or handouts today:  PTL signs/symptoms and fetal movement  Return to clinic in 4 weeks and prn if questions or concerns.     KELSEY Yeboah CNM              "

## 2019-11-18 DIAGNOSIS — E03.8 SUBCLINICAL HYPOTHYROIDISM: Primary | ICD-10-CM

## 2019-11-18 RX ORDER — LEVOTHYROXINE SODIUM 75 UG/1
75 TABLET ORAL DAILY
Qty: 60 TABLET | Refills: 3 | Status: SHIPPED | OUTPATIENT
Start: 2019-11-18 | End: 2020-01-30

## 2019-12-09 ENCOUNTER — OFFICE VISIT (OUTPATIENT)
Dept: OBGYN | Facility: CLINIC | Age: 29
End: 2019-12-09
Attending: ADVANCED PRACTICE MIDWIFE
Payer: COMMERCIAL

## 2019-12-09 VITALS
HEIGHT: 64 IN | SYSTOLIC BLOOD PRESSURE: 101 MMHG | HEART RATE: 92 BPM | BODY MASS INDEX: 21.12 KG/M2 | WEIGHT: 123.7 LBS | DIASTOLIC BLOOD PRESSURE: 70 MMHG

## 2019-12-09 DIAGNOSIS — O43.112 CIRCUMVALLATE PLACENTA IN SECOND TRIMESTER: ICD-10-CM

## 2019-12-09 DIAGNOSIS — O09.92 SUPERVISION OF HIGH RISK PREGNANCY IN SECOND TRIMESTER: Primary | ICD-10-CM

## 2019-12-09 PROCEDURE — G0463 HOSPITAL OUTPT CLINIC VISIT: HCPCS | Mod: ZF

## 2019-12-09 ASSESSMENT — MIFFLIN-ST. JEOR: SCORE: 1271.1

## 2019-12-09 ASSESSMENT — PAIN SCALES - GENERAL: PAINLEVEL: NO PAIN (0)

## 2019-12-09 NOTE — PROGRESS NOTES
"Subjective:      29 year old  at 23w5d presents for a routine prenatal appointment.         Denies cramping/contractions, vaginal bleeding, discharge or leakage of fluid. Reports +fetal movement.  No HA, vision changes, ruq/epigastric pain.       Patient concerns: Feeling well overall. Has some nasal congestion and sore throat. Has not taken any medications.      Increased synthroid to 75mcg on 19.   Finding of circumvallate placenta on level 2 ultrasound. Having a boy!    Objective:  Vitals:    19 0943   BP: 101/70   Pulse: 92   Weight: 56.1 kg (123 lb 11.2 oz)   Height: 1.626 m (5' 4\")         See OB flowsheet    Assessment/Plan     Encounter Diagnoses   Name Primary?     Supervision of high risk pregnancy in second trimester Yes     Circumvallate placenta in second trimester      Orders Placed This Encounter   Procedures     US OB >14 Weeks Follow Up     - Reviewed total weight gain, encouraged continued healthy diet and exercise.      - Reviewed why/how to contact provider.      Patient education/orders or handouts today:  PTL signs/symptoms and fetal movement    - Reviewed medications safe to take in pregnancy.  - Reviewed level 2 ultrasound and recommendations d/t finding of circumvallate placenta.   - Needs growth ultrasound at approx 28 weeks.  Order placed. Will schedule ultrasound for next visit.  - Plan thyroid labs at next visit (approx 4-6 weeks from dose increase).    Continue scheduled prenatal care, RTC in 3 weeks for ultrasound and EOB and prn if questions or concerns.      KELSEY Lui, PADMINI   "

## 2019-12-09 NOTE — LETTER
"2019     RE: Evelia Jansen  [address]  [facility] [address]     Dear Colleague,    Thank you for referring your patient, Evelia Jansen, to the [facility] at [facility]. Please see a copy of my visit note below.    Subjective:      29 year old  at 23w5d presents for a routine prenatal appointment.         Denies cramping/contractions, vaginal bleeding, discharge or leakage of fluid. Reports +fetal movement.  No HA, vision changes, ruq/epigastric pain.       Patient concerns: Feeling well overall. Has some nasal congestion and sore throat. Has not taken any medications.      Increased synthroid to 75mcg on 19.   Finding of circumvallate placenta on level 2 ultrasound. Having a boy!    Objective:  Vitals:    19 0943   BP: 101/70   Pulse: 92   Weight: 56.1 kg (123 lb 11.2 oz)   Height: 1.626 m (5' 4\")         See OB flowsheet    Assessment/Plan     Encounter Diagnoses   Name Primary?     Supervision of high risk pregnancy in second trimester Yes     Circumvallate placenta in second trimester      Orders Placed This Encounter   Procedures     US OB >14 Weeks Follow Up     - Reviewed total weight gain, encouraged continued healthy diet and exercise.      - Reviewed why/how to contact provider.      Patient education/orders or handouts today:  PTL signs/symptoms and fetal movement    - Reviewed medications safe to take in pregnancy.  - Reviewed level 2 ultrasound and recommendations d/t finding of circumvallate placenta.   - Needs growth ultrasound at approx 28 weeks.  Order placed. Will schedule ultrasound for next visit.  - Plan thyroid labs at next visit (approx 4-6 weeks from dose increase).    Continue scheduled prenatal care, RTC in 3 weeks for ultrasound and EOB and prn if questions or concerns.      Gabe Mccoy, APRN, CNM     "

## 2019-12-30 ENCOUNTER — ANCILLARY PROCEDURE (OUTPATIENT)
Dept: ULTRASOUND IMAGING | Facility: CLINIC | Age: 29
End: 2019-12-30
Attending: ADVANCED PRACTICE MIDWIFE
Payer: COMMERCIAL

## 2019-12-30 ENCOUNTER — OFFICE VISIT (OUTPATIENT)
Dept: OBGYN | Facility: CLINIC | Age: 29
End: 2019-12-30
Attending: ADVANCED PRACTICE MIDWIFE
Payer: COMMERCIAL

## 2019-12-30 VITALS
HEART RATE: 70 BPM | WEIGHT: 125.5 LBS | SYSTOLIC BLOOD PRESSURE: 102 MMHG | BODY MASS INDEX: 21.43 KG/M2 | DIASTOLIC BLOOD PRESSURE: 66 MMHG | HEIGHT: 64 IN

## 2019-12-30 DIAGNOSIS — O43.112 CIRCUMVALLATE PLACENTA IN SECOND TRIMESTER: ICD-10-CM

## 2019-12-30 DIAGNOSIS — O09.93 SUPERVISION OF HIGH RISK PREGNANCY IN THIRD TRIMESTER: Primary | ICD-10-CM

## 2019-12-30 DIAGNOSIS — O09.92 SUPERVISION OF HIGH RISK PREGNANCY IN SECOND TRIMESTER: ICD-10-CM

## 2019-12-30 LAB
DEPRECATED CALCIDIOL+CALCIFEROL SERPL-MC: 48 UG/L (ref 20–75)
ERYTHROCYTE [DISTWIDTH] IN BLOOD BY AUTOMATED COUNT: 12.6 % (ref 10–15)
GLUCOSE 1H P 50 G GLC PO SERPL-MCNC: 189 MG/DL (ref 60–129)
HCT VFR BLD AUTO: 37.2 % (ref 35–47)
HGB BLD-MCNC: 12 G/DL (ref 11.7–15.7)
MCH RBC QN AUTO: 31.2 PG (ref 26.5–33)
MCHC RBC AUTO-ENTMCNC: 32.3 G/DL (ref 31.5–36.5)
MCV RBC AUTO: 97 FL (ref 78–100)
PLATELET # BLD AUTO: 158 10E9/L (ref 150–450)
RBC # BLD AUTO: 3.85 10E12/L (ref 3.8–5.2)
T4 FREE SERPL-MCNC: 1.11 NG/DL (ref 0.76–1.46)
TSH SERPL DL<=0.005 MIU/L-ACNC: 0.23 MU/L (ref 0.4–4)
WBC # BLD AUTO: 6.9 10E9/L (ref 4–11)

## 2019-12-30 PROCEDURE — 82306 VITAMIN D 25 HYDROXY: CPT | Performed by: ADVANCED PRACTICE MIDWIFE

## 2019-12-30 PROCEDURE — 84443 ASSAY THYROID STIM HORMONE: CPT | Performed by: ADVANCED PRACTICE MIDWIFE

## 2019-12-30 PROCEDURE — G0463 HOSPITAL OUTPT CLINIC VISIT: HCPCS

## 2019-12-30 PROCEDURE — 36415 COLL VENOUS BLD VENIPUNCTURE: CPT | Performed by: ADVANCED PRACTICE MIDWIFE

## 2019-12-30 PROCEDURE — 84439 ASSAY OF FREE THYROXINE: CPT | Performed by: ADVANCED PRACTICE MIDWIFE

## 2019-12-30 PROCEDURE — 82950 GLUCOSE TEST: CPT | Performed by: ADVANCED PRACTICE MIDWIFE

## 2019-12-30 PROCEDURE — 85027 COMPLETE CBC AUTOMATED: CPT | Performed by: ADVANCED PRACTICE MIDWIFE

## 2019-12-30 PROCEDURE — 76816 OB US FOLLOW-UP PER FETUS: CPT

## 2019-12-30 PROCEDURE — 86780 TREPONEMA PALLIDUM: CPT | Performed by: ADVANCED PRACTICE MIDWIFE

## 2019-12-30 ASSESSMENT — ANXIETY QUESTIONNAIRES
2. NOT BEING ABLE TO STOP OR CONTROL WORRYING: NOT AT ALL
7. FEELING AFRAID AS IF SOMETHING AWFUL MIGHT HAPPEN: SEVERAL DAYS
6. BECOMING EASILY ANNOYED OR IRRITABLE: NOT AT ALL
GAD7 TOTAL SCORE: 2
3. WORRYING TOO MUCH ABOUT DIFFERENT THINGS: NOT AT ALL
5. BEING SO RESTLESS THAT IT IS HARD TO SIT STILL: NOT AT ALL
1. FEELING NERVOUS, ANXIOUS, OR ON EDGE: SEVERAL DAYS

## 2019-12-30 ASSESSMENT — PATIENT HEALTH QUESTIONNAIRE - PHQ9
5. POOR APPETITE OR OVEREATING: NOT AT ALL
SUM OF ALL RESPONSES TO PHQ QUESTIONS 1-9: 1

## 2019-12-30 ASSESSMENT — MIFFLIN-ST. JEOR: SCORE: 1279.26

## 2019-12-30 NOTE — LETTER
2019       RE: Evelia Jansen  [address]  [facility] [place] [facility] [address]     Dear Colleague,    Thank you for referring your patient, Evelia Jansen, to the [facility] at [facility]. Please see a copy of my visit note below.    29 year old, , 26w5d, presents for EOB visit.    Patient concerns: Feeling well overall. Ultrasound done today for growth d/t circumvallate placenta and marginal cord insertion. EFW 56%tile.   Noticing some leg cramps at night.     Denies cramping/contractions, vaginal bleeding, discharge or leakage of fluid. Reports +fetal movement.  No HA, vision changes, ruq/epigastric pain.      Education completed today includes breast feeding, [facility] hand out , contraception, counting movements, signs of pre-term labor, when to present to birthplace, post partum depression, GBS, getting enough iron and labor induction.  Birth preferences reviewed:  Undecided- had epidural last time  Skin to skin, delayed cord clamping, dad to cut cord.   Labor support:     [place] Feeding plans : breastfeeding   Contraception planned: contraception  The following labs were ordered today:       GCT, CBC w platelets, Vitamin d, Anti-treponema, tsh with t4 reflex  Water birth consent form was not given- not interested.    Ultrasound:  Presentation cephalic     USEGA = 27 1/7 weeks.  EFW = 1023 grams, 56 % for 26 weeks.     MVP = 4.1cm.  FHR = 147bpm      Placenta anterior and grade 1     Comments: Appropriate interval growth.      Findings discussed with patient.     Further studies- repeat growth ultrasound in 4 weeks .    Blood type:   ABO   Date Value Ref Range Status   2019 B  Final     RH(D)   Date Value Ref Range Status   2019 Pos  Final     Antibody Screen   Date Value Ref Range Status   2019 Neg  Final   Rhogam  was not given.  TDAP  was not given- pt < 27 weeks.    A/P:  Encounter Diagnoses   Name Primary?     Supervision of  high risk pregnancy in third trimester Yes     Circumvallate placenta in second trimester      Orders Placed This Encounter   Procedures     US OB >14 Weeks Follow Up     Glucose 1 Hour     CBC with Platelets     25- OH-Vitamin D     Treponema Abs w Reflex to RPR and Titer     TSH with free T4 reflex     T4 free     T4 free     - EOB education reviewed.  - EOB labs ordered- 1 hour glucose, cbc with plts, anti-trep, vitamin D.   - Thyroid labs added.  - Offer tdap after 27 weeks.  - Reviewed today's ultrasound. Recommend q4 week growth ultrasounds.     Continue scheduled prenatal care, RTC in 3 weeks and prn if questions or concerns.      Gabe Mccoy, APRN, CNM

## 2019-12-30 NOTE — PROGRESS NOTES
29 year old, , 26w5d, presents for EOB visit.    Patient concerns: Feeling well overall. Ultrasound done today for growth d/t circumvallate placenta and marginal cord insertion. EFW 56%tile.   Noticing some leg cramps at night.     Denies cramping/contractions, vaginal bleeding, discharge or leakage of fluid. Reports +fetal movement.  No HA, vision changes, ruq/epigastric pain.      Education completed today includes breast feeding, [facility] hand out , contraception, counting movements, signs of pre-term labor, when to present to birthplace, post partum depression, GBS, getting enough iron and labor induction.  Birth preferences reviewed:  Undecided- had epidural last time  Skin to skin, delayed cord clamping, dad to cut cord.   Labor support:      Feeding plans : breastfeeding   Contraception planned: contraception  The following labs were ordered today:       GCT, CBC w platelets, Vitamin d, Anti-treponema, tsh with t4 reflex  Water birth consent form was not given- not interested.    Ultrasound:    Presentation cephalic     USEGA = 27 1/7 weeks.  EFW = 1023 grams, 56 % for 26 weeks.     MVP = 4.1cm.  FHR = 147bpm      Placenta anterior and grade 1     Comments: Appropriate interval growth.      Findings discussed with patient.     Further studies- repeat growth ultrasound in 4 weeks .    Blood type:   ABO   Date Value Ref Range Status   2019 B  Final     RH(D)   Date Value Ref Range Status   2019 Pos  Final     Antibody Screen   Date Value Ref Range Status   2019 Neg  Final   Rhogam  was not given.  TDAP  was not given- pt < 27 weeks.    A/P:  Encounter Diagnoses   Name Primary?     Supervision of high risk pregnancy in third trimester Yes     Circumvallate placenta in second trimester      Orders Placed This Encounter   Procedures     US OB >14 Weeks Follow Up     Glucose 1 Hour     CBC with Platelets     25- OH-Vitamin D     Treponema Abs w Reflex to RPR and Titer     TSH  with free T4 reflex     T4 free     T4 free       - EOB education reviewed.  - EOB labs ordered- 1 hour glucose, cbc with plts, anti-trep, vitamin D.   - Thyroid labs added.  - Offer tdap after 27 weeks.  - Reviewed today's ultrasound. Recommend q4 week growth ultrasounds.     Continue scheduled prenatal care, RTC in 3 weeks and prn if questions or concerns.      KELSEY Lui, CNM

## 2019-12-31 LAB — T PALLIDUM AB SER QL: NONREACTIVE

## 2019-12-31 ASSESSMENT — ANXIETY QUESTIONNAIRES: GAD7 TOTAL SCORE: 2

## 2020-01-01 PROBLEM — O43.199 MARGINAL INSERTION OF UMBILICAL CORD AFFECTING MANAGEMENT OF MOTHER: Status: ACTIVE | Noted: 2019-11-05

## 2020-01-01 PROBLEM — R73.03 PRE-DIABETES: Status: ACTIVE | Noted: 2019-02-20

## 2020-01-01 PROBLEM — E03.8 SUBCLINICAL HYPOTHYROIDISM: Status: ACTIVE | Noted: 2019-02-20

## 2020-01-01 PROBLEM — O43.119 CIRCUMVALLATE PLACENTA: Status: ACTIVE | Noted: 2019-11-07

## 2020-01-02 DIAGNOSIS — O09.93 SUPERVISION OF HIGH RISK PREGNANCY IN THIRD TRIMESTER: Primary | ICD-10-CM

## 2020-01-09 DIAGNOSIS — Z86.32 HISTORY OF GESTATIONAL DIABETES: ICD-10-CM

## 2020-01-09 DIAGNOSIS — O09.93 SUPERVISION OF HIGH RISK PREGNANCY IN THIRD TRIMESTER: ICD-10-CM

## 2020-01-09 DIAGNOSIS — O09.91 SUPERVISION OF HIGH RISK PREGNANCY IN FIRST TRIMESTER: Primary | ICD-10-CM

## 2020-01-09 LAB
GLUCOSE 1H P 100 G GLC PO SERPL-MCNC: 154 MG/DL (ref 60–179)
GLUCOSE 2H P 100 G GLC PO SERPL-MCNC: 131 MG/DL (ref 60–154)
GLUCOSE 3H P 100 G GLC PO SERPL-MCNC: 79 MG/DL (ref 60–139)
GLUCOSE BLDC GLUCOMTR-MCNC: 76 MG/DL (ref 70–99)
GLUCOSE P FAST SERPL-MCNC: 75 MG/DL (ref 60–94)

## 2020-01-09 PROCEDURE — 82951 GLUCOSE TOLERANCE TEST (GTT): CPT | Performed by: ADVANCED PRACTICE MIDWIFE

## 2020-01-09 PROCEDURE — 36415 COLL VENOUS BLD VENIPUNCTURE: CPT | Performed by: ADVANCED PRACTICE MIDWIFE

## 2020-01-09 PROCEDURE — 82952 GTT-ADDED SAMPLES: CPT | Performed by: ADVANCED PRACTICE MIDWIFE

## 2020-01-09 PROCEDURE — 82962 GLUCOSE BLOOD TEST: CPT

## 2020-01-10 ENCOUNTER — MYC MEDICAL ADVICE (OUTPATIENT)
Dept: OBGYN | Facility: CLINIC | Age: 30
End: 2020-01-10

## 2020-01-10 ENCOUNTER — TELEPHONE (OUTPATIENT)
Dept: OBGYN | Facility: CLINIC | Age: 30
End: 2020-01-10

## 2020-01-10 DIAGNOSIS — E03.8 SUBCLINICAL HYPOTHYROIDISM: ICD-10-CM

## 2020-01-10 RX ORDER — LEVOTHYROXINE SODIUM 50 UG/1
50 TABLET ORAL DAILY
Qty: 45 TABLET | Refills: 0 | Status: SHIPPED | OUTPATIENT
Start: 2020-01-10 | End: 2020-05-14

## 2020-01-10 RX ORDER — LEVOTHYROXINE SODIUM 25 UG/1
12.5 TABLET ORAL DAILY
Qty: 45 TABLET | Refills: 0 | Status: SHIPPED | OUTPATIENT
Start: 2020-01-10 | End: 2020-05-14

## 2020-01-10 NOTE — TELEPHONE ENCOUNTER
Received call from midwife that patient passed 3hr glucose test but orders were entered in error to [facility] and this needs to be cancelled.  Patient already notified of normal result.    Called [facility] and informed of need to cancel referral order - order was cancelled.   Nurse also discontinued diabetic testing supplies.   Calm

## 2020-01-10 NOTE — TELEPHONE ENCOUNTER
Call to patient. Left message notifying patient that levothyroxine dose will be decreased to 62.5. Rx sent to [facility].     KELSEY Borden, ARENM

## 2020-01-30 ENCOUNTER — OFFICE VISIT (OUTPATIENT)
Dept: OBGYN | Facility: CLINIC | Age: 30
End: 2020-01-30
Attending: MIDWIFE
Payer: COMMERCIAL

## 2020-01-30 ENCOUNTER — ANCILLARY PROCEDURE (OUTPATIENT)
Dept: ULTRASOUND IMAGING | Facility: CLINIC | Age: 30
End: 2020-01-30
Attending: ADVANCED PRACTICE MIDWIFE
Payer: COMMERCIAL

## 2020-01-30 VITALS
BODY MASS INDEX: 22.4 KG/M2 | HEART RATE: 82 BPM | WEIGHT: 131.2 LBS | DIASTOLIC BLOOD PRESSURE: 61 MMHG | HEIGHT: 64 IN | SYSTOLIC BLOOD PRESSURE: 98 MMHG

## 2020-01-30 DIAGNOSIS — O43.112 CIRCUMVALLATE PLACENTA IN SECOND TRIMESTER: ICD-10-CM

## 2020-01-30 DIAGNOSIS — E03.8 SUBCLINICAL HYPOTHYROIDISM: ICD-10-CM

## 2020-01-30 DIAGNOSIS — O09.91 SUPERVISION OF HIGH RISK PREGNANCY IN FIRST TRIMESTER: Primary | ICD-10-CM

## 2020-01-30 DIAGNOSIS — N64.3: ICD-10-CM

## 2020-01-30 DIAGNOSIS — O09.93 SUPERVISION OF HIGH RISK PREGNANCY IN THIRD TRIMESTER: ICD-10-CM

## 2020-01-30 DIAGNOSIS — R73.03 PRE-DIABETES: ICD-10-CM

## 2020-01-30 PROCEDURE — 76816 OB US FOLLOW-UP PER FETUS: CPT

## 2020-01-30 PROCEDURE — 90715 TDAP VACCINE 7 YRS/> IM: CPT | Mod: ZF

## 2020-01-30 PROCEDURE — 90471 IMMUNIZATION ADMIN: CPT | Mod: ZF

## 2020-01-30 PROCEDURE — G0463 HOSPITAL OUTPT CLINIC VISIT: HCPCS | Mod: ZF,25

## 2020-01-30 PROCEDURE — 25000128 H RX IP 250 OP 636: Mod: ZF

## 2020-01-30 RX ORDER — BREAST PUMP
1 EACH MISCELLANEOUS DAILY PRN
Qty: 1 EACH | Refills: 0 | Status: SHIPPED | OUTPATIENT
Start: 2020-01-30 | End: 2021-04-09

## 2020-01-30 ASSESSMENT — MIFFLIN-ST. JEOR: SCORE: 1305.12

## 2020-01-30 ASSESSMENT — PAIN SCALES - GENERAL: PAINLEVEL: NO PAIN (0)

## 2020-01-30 NOTE — PROGRESS NOTES
"Subjective:      29 year old  at 31w1d presentst for a routine prenatal appointment.    no vaginal bleeding or leakage of fluid.  No  contractions. Good  fetal movement.       No HA, visual changes, RUQ or epigastric pain.   Patient concerns: international student elementary Ed. Spouse working on PHD in [employer] science    Feeling well overall.  Reviewed EOB labs with patient.  Reviewed TDAP Previously given  Objective:  Vitals:    20 1030   BP: 98/61   Pulse: 82   Weight: 59.5 kg (131 lb 3.2 oz)   Height: 1.626 m (5' 4\")   , see ob flowsheet  Assessment/Plan     Encounter Diagnoses   Name Primary?     Supervision of high risk pregnancy in first trimester Yes     Subclinical hypothyroidism      Pre-diabetes      Orders Placed This Encounter   Procedures     TSH with free T4 reflex     No orders of the defined types were placed in this encounter.    ABO   Date Value Ref Range Status   2019 B  Final     RH(D)   Date Value Ref Range Status   2019 Pos  Final     Antibody Screen   Date Value Ref Range Status   2019 Neg  Final   , Rhogam  was notgiven.    - Reviewed total weight gain, encouraged continued healthy diet and exercise.  .  Reviewed importance of daily fetal kick count and why/how to contact provider.    - Reviewed why/how to contact provider if headache/visual changes/RUQ or epigastric pain, decreased fetal movement, vaginal bleeding, leakage of fluid or more than 4 contractions in an hour.     Patient education/orders or handouts today:  PTL signs/symptoms and TDAP  Reviewed GBS screening at 35-36 wks.    Return to clinic in 2 weeks and prn if questions or concerns.  Will need repeat US in 4 wks and TSHR next visit      KELSEY Johnson CNM      "

## 2020-01-30 NOTE — LETTER
"2020       RE: Evelia Jansen  [address]  [place] [address]     Dear Colleague,    Thank you for referring your patient, Evelia Jansen, to the [facility] at [facility]. Please see a copy of my visit note below.    Subjective:      29 year old  at 31w1d presentst for a routine prenatal appointment.    no vaginal bleeding or leakage of fluid.  No  contractions. Good  fetal movement.       No HA, visual changes, RUQ or epigastric pain.   Patient concerns: international student elementary Ed. Spouse working on PHD in [employer] science    Feeling well overall.  Reviewed EOB labs with patient.  Reviewed TDAP Previously given  Objective:  Vitals:    20 1030   BP: 98/61   Pulse: 82   Weight: 59.5 kg (131 lb 3.2 oz)   Height: 1.626 m (5' 4\")   see ob flowsheet    Assessment/Plan  Encounter Diagnoses   Name Primary?     Supervision of high risk pregnancy in first trimester Yes     Subclinical hypothyroidism      Pre-diabetes      Orders Placed This Encounter   Procedures     TSH with free T4 reflex     No orders of the defined types were placed in this encounter.    ABO   Date Value Ref Range Status   2019 B  Final     RH(D)   Date Value Ref Range Status   2019 Pos  Final     Antibody Screen   Date Value Ref Range Status   2019 Neg  Final   , Rhogam  was notgiven.    - Reviewed total weight gain, encouraged continued healthy diet and exercise.  .  Reviewed importance of daily fetal kick count and why/how to contact provider.    - Reviewed why/how to contact provider if headache/visual changes/RUQ or epigastric pain, decreased fetal movement, vaginal bleeding, leakage of fluid or more than 4 contractions in an hour.     Patient education/orders or handouts today:  PTL signs/symptoms and TDAP  Reviewed GBS screening at 35-36 wks.    Return to clinic in 2 weeks and prn if questions or concerns.  Will need repeat US in 4 wks and " TSHR next visit      Radha Hurtado, APRN CNM

## 2020-02-13 ENCOUNTER — OFFICE VISIT (OUTPATIENT)
Dept: OBGYN | Facility: CLINIC | Age: 30
End: 2020-02-13
Attending: ADVANCED PRACTICE MIDWIFE
Payer: COMMERCIAL

## 2020-02-13 VITALS
WEIGHT: 132.8 LBS | DIASTOLIC BLOOD PRESSURE: 62 MMHG | BODY MASS INDEX: 22.67 KG/M2 | HEART RATE: 92 BPM | SYSTOLIC BLOOD PRESSURE: 96 MMHG | HEIGHT: 64 IN

## 2020-02-13 DIAGNOSIS — O09.93 SUPERVISION OF HIGH RISK PREGNANCY IN THIRD TRIMESTER: Primary | ICD-10-CM

## 2020-02-13 DIAGNOSIS — O09.91 SUPERVISION OF HIGH RISK PREGNANCY IN FIRST TRIMESTER: ICD-10-CM

## 2020-02-13 DIAGNOSIS — E03.8 SUBCLINICAL HYPOTHYROIDISM: ICD-10-CM

## 2020-02-13 LAB — TSH SERPL DL<=0.005 MIU/L-ACNC: 1.16 MU/L (ref 0.4–4)

## 2020-02-13 PROCEDURE — 36415 COLL VENOUS BLD VENIPUNCTURE: CPT | Performed by: MIDWIFE

## 2020-02-13 PROCEDURE — 90686 IIV4 VACC NO PRSV 0.5 ML IM: CPT | Mod: ZF

## 2020-02-13 PROCEDURE — 84443 ASSAY THYROID STIM HORMONE: CPT | Performed by: MIDWIFE

## 2020-02-13 PROCEDURE — G0463 HOSPITAL OUTPT CLINIC VISIT: HCPCS | Mod: 25,ZF

## 2020-02-13 PROCEDURE — 25000128 H RX IP 250 OP 636: Mod: ZF

## 2020-02-13 PROCEDURE — G0008 ADMIN INFLUENZA VIRUS VAC: HCPCS | Mod: ZF

## 2020-02-13 ASSESSMENT — MIFFLIN-ST. JEOR: SCORE: 1312.38

## 2020-02-13 ASSESSMENT — PAIN SCALES - GENERAL: PAINLEVEL: NO PAIN (0)

## 2020-02-13 NOTE — NURSING NOTE
Clinic Administered Medication Documentation    MEDICATION LIST:   Injectable Medication Documentation    Patient was given Influenza Vaccine. Prior to medication administration, verified patients identity using patient s name and date of birth. Please see MAR and medication order for additional information. Patient instructed to report any adverse reaction to staff immediately .      Was entire vial of medication used? Yes  Vial/Syringe: Syringe  Expiration Date:  6/30/2020  Was this medication supplied by the patient? No

## 2020-02-13 NOTE — PROGRESS NOTES
"Subjective:      29 year old  at 33w1d presentst for a routine prenatal appointment.   Denies vaginal bleeding or leakage of fluid.  Denies contractions. + fetal movement.      No HA, visual changes, RUQ or epigastric pain.   Patient concerns: Feeling well overall.   - Questions re: flu shot, flu restrictions in hospital. Was planning for her mother to be here at time of delivery, but d/t corona virus will no longer be present. Does have back-up person available.      Objective:  Vitals:    20 0955   BP: 96/62   Pulse: 92   Weight: 60.2 kg (132 lb 12.8 oz)   Height: 1.626 m (5' 4\")   See ob flowsheet    Assessment/Plan:  Encounter Diagnoses   Name Primary?     Supervision of high risk pregnancy in third trimester Yes     Subclinical hypothyroidism      No orders of the defined types were placed in this encounter.    No orders of the defined types were placed in this encounter.    - Reviewed total weight gain, encouraged continued healthy diet and exercise.  - Reviewed importance of daily fetal kick count and why/how to contact provider.  - Reviewed why/how to contact provider if headache/visual changes/RUQ or epigastric pain, decreased fetal movement, vaginal bleeding, leakage of fluid or more than 4 contractions in an hour.  - Patient education/orders or handouts today: PTL signs/symptoms and Flu shot, visitor restrictions in hospital for < 5 years old  - Ultrasound in 2 weeks  - Return to clinic in 2 weeks and prn if questions or concerns.     KELSEY Zhou CNM  "

## 2020-02-13 NOTE — LETTER
"2020       RE: Evelia Jansen  [address]  [place] [facility] [address]     Dear Colleague,    Thank you for referring your patient, Evelia Jansen, to the [facility] at [facility]. Please see a copy of my visit note below.    Subjective:      29 year old  at 33w1d presentst for a routine prenatal appointment.   Denies vaginal bleeding or leakage of fluid.  Denies contractions. + fetal movement.      No HA, visual changes, RUQ or epigastric pain.   Patient concerns: Feeling well overall.   - Questions re: flu shot, flu restrictions in hospital. Was planning for her mother to be here at time of delivery, but d/t corona virus will no longer be present. Does have back-up person available.      Objective:  Vitals:    20 0955   BP: 96/62   Pulse: 92   Weight: 60.2 kg (132 lb 12.8 oz)   Height: 1.626 m (5' 4\")   See ob flowsheet    Assessment/Plan:  Encounter Diagnoses   Name Primary?     Supervision of high risk pregnancy in third trimester Yes     Subclinical hypothyroidism      No orders of the defined types were placed in this encounter.    No orders of the defined types were placed in this encounter.    - Reviewed total weight gain, encouraged continued healthy diet and exercise.  - Reviewed importance of daily fetal kick count and why/how to contact provider.  - Reviewed why/how to contact provider if headache/visual changes/RUQ or epigastric pain, decreased fetal movement, vaginal bleeding, leakage of fluid or more than 4 contractions in an hour.  - Patient education/orders or handouts today: PTL signs/symptoms and Flu shot, visitor restrictions in hospital for < 5 years old  - Ultrasound in 2 weeks  - Return to clinic in 2 weeks and prn if questions or concerns.     KELSEY Zhou CNM        "

## 2020-02-27 ENCOUNTER — ANCILLARY PROCEDURE (OUTPATIENT)
Dept: ULTRASOUND IMAGING | Facility: CLINIC | Age: 30
End: 2020-02-27
Attending: ADVANCED PRACTICE MIDWIFE
Payer: COMMERCIAL

## 2020-02-27 ENCOUNTER — OFFICE VISIT (OUTPATIENT)
Dept: OBGYN | Facility: CLINIC | Age: 30
End: 2020-02-27
Attending: ADVANCED PRACTICE MIDWIFE
Payer: COMMERCIAL

## 2020-02-27 VITALS
DIASTOLIC BLOOD PRESSURE: 72 MMHG | WEIGHT: 134 LBS | HEIGHT: 64 IN | BODY MASS INDEX: 22.88 KG/M2 | HEART RATE: 81 BPM | SYSTOLIC BLOOD PRESSURE: 108 MMHG

## 2020-02-27 DIAGNOSIS — O09.93 SUPERVISION OF HIGH RISK PREGNANCY IN THIRD TRIMESTER: Primary | ICD-10-CM

## 2020-02-27 PROCEDURE — G0463 HOSPITAL OUTPT CLINIC VISIT: HCPCS | Mod: 25

## 2020-02-27 PROCEDURE — 76816 OB US FOLLOW-UP PER FETUS: CPT

## 2020-02-27 ASSESSMENT — MIFFLIN-ST. JEOR: SCORE: 1318.07

## 2020-02-27 ASSESSMENT — PAIN SCALES - GENERAL: PAINLEVEL: NO PAIN (0)

## 2020-02-27 NOTE — LETTER
"2020       RE: Evelia Jansen  [address]  [facility] [address]     Dear Colleague,    Thank you for referring your patient, Evelia Jansen, to the [facility] at [facility]. Please see a copy of my visit note below.    Subjective:      29 year old  at 35w1d presents for a routine prenatal appointment.    Denies cramping/contractions, vaginal bleeding, discharge or leakage of fluid. Reports +fetal movement.  No HA, vision changes, ruq/epigastric pain.      Patient concerns: Feeling well overall. Growth ultrasound done today d/t marginal cord insertion and circumvallate placenta- EFW 38%tile- 5lb 9oz. Had repeat thyroid labs  after dose change on 1/10- QNL.     Objective:  Vitals:    20 1026   BP: 108/72   Pulse: 81   Weight: 60.8 kg (134 lb)   Height: 1.626 m (5' 4.02\")        See OB flowsheet    Assessment/Plan     Encounter Diagnosis   Name Primary?     Supervision of high risk pregnancy in third trimester Yes     Labor signs discussed. Reinforced daily fetal movement counts.  Reviewed why/how to contact provider if headache/visual changes/RUQ or epigastric pain, decreased fetal movement, vaginal bleeding, leakage of fluid.    - Reviewed normal ultrasound.  - Plan GBS and CBC with plts at 36 weeks.    Continue scheduled prenatal care, RTC in 1 week and prn if questions or concerns.      Gabe Mccoy, KELSEY, ARENM         "

## 2020-02-27 NOTE — PROGRESS NOTES
"Subjective:      29 year old  at 35w1d presents for a routine prenatal appointment.    Denies cramping/contractions, vaginal bleeding, discharge or leakage of fluid. Reports +fetal movement.  No HA, vision changes, ruq/epigastric pain.      Patient concerns: Feeling well overall. Growth ultrasound done today d/t marginal cord insertion and circumvallate placenta- EFW 38%tile- 5lb 9oz. Had repeat thyroid labs  after dose change on 1/10- WNL.     Objective:  Vitals:    20 1026   BP: 108/72   Pulse: 81   Weight: 60.8 kg (134 lb)   Height: 1.626 m (5' 4.02\")        See OB flowsheet    Assessment/Plan     Encounter Diagnosis   Name Primary?     Supervision of high risk pregnancy in third trimester Yes     Labor signs discussed. Reinforced daily fetal movement counts.  Reviewed why/how to contact provider if headache/visual changes/RUQ or epigastric pain, decreased fetal movement, vaginal bleeding, leakage of fluid.    - Reviewed normal ultrasound.  - Plan GBS and CBC with plts at 36 weeks.    Continue scheduled prenatal care, RTC in 1 week and prn if questions or concerns.      KELSEY Lui, CNM   "

## 2020-03-05 ENCOUNTER — OFFICE VISIT (OUTPATIENT)
Dept: OBGYN | Facility: CLINIC | Age: 30
End: 2020-03-05
Attending: ADVANCED PRACTICE MIDWIFE
Payer: COMMERCIAL

## 2020-03-05 VITALS
HEART RATE: 75 BPM | HEIGHT: 64 IN | DIASTOLIC BLOOD PRESSURE: 66 MMHG | SYSTOLIC BLOOD PRESSURE: 100 MMHG | WEIGHT: 139.3 LBS | BODY MASS INDEX: 23.78 KG/M2

## 2020-03-05 DIAGNOSIS — O09.93 HRP (HIGH RISK PREGNANCY), THIRD TRIMESTER: Primary | ICD-10-CM

## 2020-03-05 DIAGNOSIS — E03.8 SUBCLINICAL HYPOTHYROIDISM: ICD-10-CM

## 2020-03-05 LAB
ERYTHROCYTE [DISTWIDTH] IN BLOOD BY AUTOMATED COUNT: 13 % (ref 10–15)
HCT VFR BLD AUTO: 37.8 % (ref 35–47)
HGB BLD-MCNC: 12.6 G/DL (ref 11.7–15.7)
MCH RBC QN AUTO: 31.2 PG (ref 26.5–33)
MCHC RBC AUTO-ENTMCNC: 33.3 G/DL (ref 31.5–36.5)
MCV RBC AUTO: 94 FL (ref 78–100)
PLATELET # BLD AUTO: 139 10E9/L (ref 150–450)
RBC # BLD AUTO: 4.04 10E12/L (ref 3.8–5.2)
TSH SERPL DL<=0.005 MIU/L-ACNC: 1.34 MU/L (ref 0.4–4)
WBC # BLD AUTO: 6.7 10E9/L (ref 4–11)

## 2020-03-05 PROCEDURE — 85027 COMPLETE CBC AUTOMATED: CPT | Performed by: ADVANCED PRACTICE MIDWIFE

## 2020-03-05 PROCEDURE — 87653 STREP B DNA AMP PROBE: CPT | Performed by: ADVANCED PRACTICE MIDWIFE

## 2020-03-05 PROCEDURE — G0463 HOSPITAL OUTPT CLINIC VISIT: HCPCS | Mod: ZF

## 2020-03-05 PROCEDURE — 84443 ASSAY THYROID STIM HORMONE: CPT | Performed by: ADVANCED PRACTICE MIDWIFE

## 2020-03-05 PROCEDURE — 36415 COLL VENOUS BLD VENIPUNCTURE: CPT | Performed by: ADVANCED PRACTICE MIDWIFE

## 2020-03-05 ASSESSMENT — ANXIETY QUESTIONNAIRES
5. BEING SO RESTLESS THAT IT IS HARD TO SIT STILL: NOT AT ALL
1. FEELING NERVOUS, ANXIOUS, OR ON EDGE: NOT AT ALL
7. FEELING AFRAID AS IF SOMETHING AWFUL MIGHT HAPPEN: NOT AT ALL
GAD7 TOTAL SCORE: 0
6. BECOMING EASILY ANNOYED OR IRRITABLE: NOT AT ALL
3. WORRYING TOO MUCH ABOUT DIFFERENT THINGS: NOT AT ALL
2. NOT BEING ABLE TO STOP OR CONTROL WORRYING: NOT AT ALL

## 2020-03-05 ASSESSMENT — MIFFLIN-ST. JEOR: SCORE: 1341.86

## 2020-03-05 ASSESSMENT — PATIENT HEALTH QUESTIONNAIRE - PHQ9: 5. POOR APPETITE OR OVEREATING: NOT AT ALL

## 2020-03-05 NOTE — PROGRESS NOTES
"Subjective:   3   29 year old  at 36w1d presents for a routine prenatal appointment.         Denies vaginal bleeding,  leakage of fluid, or change in vaginal discharge.  Reports irregular contractions.  Reports regular fetal movement.     No HA, visual changes, RUQ or epigastric pain.   Patient concerns: More pelvic pressure  Feeling well overall.   Objective:  Vitals:    20 1605   BP: 100/66   BP Location: Left arm   Patient Position: Chair   Pulse: 75   Weight: 63.2 kg (139 lb 4.8 oz)   Height: 1.626 m (5' 4\")    See OB flowsheet    Assessment/Plan     Encounter Diagnoses   Name Primary?     HRP (high risk pregnancy), third trimester Yes     Subclinical hypothyroidism      Orders Placed This Encounter   Procedures     CBC with Platelets         PHQ-9 SCORE 11/3/2017 2018 2019   PHQ-9 Total Score 0 0 1     PHQ 11/3/2017 2018 2019   PHQ-9 Total Score 0 0 1   Q9: Thoughts of better off dead/self-harm past 2 weeks Not at all Not at all Not at all     GBS screening: Obtained. CBC and TSH collected.  Birth preferences reviewed: Medicated  Labor signs discussed. Reinforced daily fetal movement counts.  Reviewed why/how to contact provider if headache/visual changes/RUQ or epigastric pain, decreased fetal movement, vaginal bleeding, leakage of fluid.   Return to clinic in 1 week and prn if questions or concerns.     KELSEY Yeboah CNM  "

## 2020-03-05 NOTE — LETTER
"3/5/2020       RE: Evelia Jansen  [address]  [place] [facility] [address]     Dear Colleague,    Thank you for referring your patient, Evelia Jansen, to the [facility] at [facility]. Please see a copy of my visit note below.    Subjective:   3   29 year old  at 36w1d presents for a routine prenatal appointment.         Denies vaginal bleeding,  leakage of fluid, or change in vaginal discharge.  Reports irregular contractions.  Reports regular fetal movement.     No HA, visual changes, RUQ or epigastric pain.   Patient concerns: More pelvic pressure  Feeling well overall.   Objective:  Vitals:    20 1605   BP: 100/66   BP Location: Left arm   Patient Position: Chair   Pulse: 75   Weight: 63.2 kg (139 lb 4.8 oz)   Height: 1.626 m (5' 4\")    See OB flowsheet    Assessment/Plan     Encounter Diagnoses   Name Primary?     HRP (high risk pregnancy), third trimester Yes     Subclinical hypothyroidism      Orders Placed This Encounter   Procedures     CBC with Platelets         PHQ-9 SCORE 11/3/2017 2018 2019   PHQ-9 Total Score 0 0 1     PHQ 11/3/2017 2018 2019   PHQ-9 Total Score 0 0 1   Q9: Thoughts of better off dead/self-harm past 2 weeks Not at all Not at all Not at all     GBS screening: Obtained. CBC and TSH collected.  Birth preferences reviewed: Medicated  Labor signs discussed. Reinforced daily fetal movement counts.  Reviewed why/how to contact provider if headache/visual changes/RUQ or epigastric pain, decreased fetal movement, vaginal bleeding, leakage of fluid.   Return to clinic in 1 week and prn if questions or concerns.     KELSEY Yeboah CNM      "

## 2020-03-06 LAB
GP B STREP DNA SPEC QL NAA+PROBE: NEGATIVE
SPECIMEN SOURCE: NORMAL

## 2020-03-06 ASSESSMENT — ANXIETY QUESTIONNAIRES: GAD7 TOTAL SCORE: 0

## 2020-03-09 PROBLEM — D69.6 THROMBOCYTOPENIA (H): Status: ACTIVE | Noted: 2020-03-09

## 2020-03-10 ENCOUNTER — HEALTH MAINTENANCE LETTER (OUTPATIENT)
Age: 30
End: 2020-03-10

## 2020-03-12 ENCOUNTER — APPOINTMENT (OUTPATIENT)
Dept: LAB | Facility: CLINIC | Age: 30
End: 2020-03-12
Attending: ADVANCED PRACTICE MIDWIFE
Payer: COMMERCIAL

## 2020-03-12 ENCOUNTER — OFFICE VISIT (OUTPATIENT)
Dept: OBGYN | Facility: CLINIC | Age: 30
End: 2020-03-12
Attending: ADVANCED PRACTICE MIDWIFE
Payer: COMMERCIAL

## 2020-03-12 VITALS
DIASTOLIC BLOOD PRESSURE: 73 MMHG | BODY MASS INDEX: 23.9 KG/M2 | WEIGHT: 140 LBS | HEART RATE: 78 BPM | HEIGHT: 64 IN | SYSTOLIC BLOOD PRESSURE: 110 MMHG

## 2020-03-12 DIAGNOSIS — O09.93 SUPERVISION OF HIGH RISK PREGNANCY IN THIRD TRIMESTER: Primary | ICD-10-CM

## 2020-03-12 DIAGNOSIS — Z86.32 HISTORY OF GESTATIONAL DIABETES: ICD-10-CM

## 2020-03-12 DIAGNOSIS — R73.03 PRE-DIABETES: ICD-10-CM

## 2020-03-12 DIAGNOSIS — E03.8 SUBCLINICAL HYPOTHYROIDISM: ICD-10-CM

## 2020-03-12 DIAGNOSIS — O43.199 MARGINAL INSERTION OF UMBILICAL CORD AFFECTING MANAGEMENT OF MOTHER: ICD-10-CM

## 2020-03-12 DIAGNOSIS — D69.6 THROMBOCYTOPENIA (H): ICD-10-CM

## 2020-03-12 DIAGNOSIS — O43.113 CIRCUMVALLATE PLACENTA IN THIRD TRIMESTER: ICD-10-CM

## 2020-03-12 LAB
ERYTHROCYTE [DISTWIDTH] IN BLOOD BY AUTOMATED COUNT: 13 % (ref 10–15)
HCT VFR BLD AUTO: 39.3 % (ref 35–47)
HGB BLD-MCNC: 12.9 G/DL (ref 11.7–15.7)
MCH RBC QN AUTO: 30.8 PG (ref 26.5–33)
MCHC RBC AUTO-ENTMCNC: 32.8 G/DL (ref 31.5–36.5)
MCV RBC AUTO: 94 FL (ref 78–100)
PLATELET # BLD AUTO: 149 10E9/L (ref 150–450)
RBC # BLD AUTO: 4.19 10E12/L (ref 3.8–5.2)
WBC # BLD AUTO: 6.6 10E9/L (ref 4–11)

## 2020-03-12 PROCEDURE — 85027 COMPLETE CBC AUTOMATED: CPT | Performed by: ADVANCED PRACTICE MIDWIFE

## 2020-03-12 PROCEDURE — G0463 HOSPITAL OUTPT CLINIC VISIT: HCPCS | Mod: ZF

## 2020-03-12 PROCEDURE — 36415 COLL VENOUS BLD VENIPUNCTURE: CPT | Performed by: ADVANCED PRACTICE MIDWIFE

## 2020-03-12 ASSESSMENT — MIFFLIN-ST. JEOR: SCORE: 1345.04

## 2020-03-12 NOTE — LETTER
"3/12/2020       RE: Evelia Jansen  [address]  [facility] [address]     Dear Colleague,    Thank you for referring your patient, Evelia Jansen, to the [facility] at [facility]. Please see a copy of my visit note below.    Subjective:     29 year old  at 37w1d presents for routine prenatal visit.            no vaginal bleeding or leakage of fluid.  some contractions.  pos fetal movement.        No HA, visual changes, RUQ or epigastric pain.   Patient concerns:   Feeling well overall. Worried about virus and fast labor, some pressure when standing in the morning after short patterns of contx  Objective:  Vitals:    20 1610   BP: 110/73   BP Location: Left arm   Patient Position: Chair   Pulse: 78   Weight: 63.5 kg (140 lb)   Height: 1.626 m (5' 4\")    See OB flowsheet    Assessment/Plan  Encounter Diagnoses   Name Primary?     Supervision of high risk pregnancy in third trimester Yes     Circumvallate placenta in third trimester      History of gestational diabetes      Subclinical hypothyroidism      Pre-diabetes      Marginal insertion of umbilical cord affecting management of mother      Thrombocytopenia (H)      Orders Placed This Encounter   Procedures     CBC with Platelets     No orders of the defined types were placed in this encounter.    - Reviewed why/how to contact provider if headache/visual changes/RUQ or epigastric pain, decreased fetal movement, vaginal bleeding, leakage of fluid or strong/regular contractions.   Patient education/orders or handouts today:  Sign/symptoms of labor and When to call for labor or other concerns  Return to clinic in 1 week and prn if questions or concerns.     Bernie Castro, APRN CNM              "

## 2020-03-12 NOTE — PROGRESS NOTES
"Subjective:     29 year old  at 37w1d presents for routine prenatal visit.            no vaginal bleeding or leakage of fluid.  some contractions.  pos fetal movement.        No HA, visual changes, RUQ or epigastric pain.   Patient concerns:   Feeling well overall. Worried about virus and fast labor, some pressure when standing in the morning after short patterns of contx  Objective:  Vitals:    20 1610   BP: 110/73   BP Location: Left arm   Patient Position: Chair   Pulse: 78   Weight: 63.5 kg (140 lb)   Height: 1.626 m (5' 4\")    See OB flowsheet  Assessment/Plan     Encounter Diagnoses   Name Primary?     Supervision of high risk pregnancy in third trimester Yes     Circumvallate placenta in third trimester      History of gestational diabetes      Subclinical hypothyroidism      Pre-diabetes      Marginal insertion of umbilical cord affecting management of mother      Thrombocytopenia (H)      Orders Placed This Encounter   Procedures     CBC with Platelets     No orders of the defined types were placed in this encounter.      - Reviewed why/how to contact provider if headache/visual changes/RUQ or epigastric pain, decreased fetal movement, vaginal bleeding, leakage of fluid or strong/regular contractions.   Patient education/orders or handouts today:  Sign/symptoms of labor and When to call for labor or other concerns  Return to clinic in 1 week and prn if questions or concerns.   Bernie Castro, APRN CNM      "

## 2020-03-19 ENCOUNTER — HOSPITAL ENCOUNTER (INPATIENT)
Facility: CLINIC | Age: 30
LOS: 1 days | Discharge: HOME | End: 2020-03-20
Attending: ADVANCED PRACTICE MIDWIFE | Admitting: MIDWIFE
Payer: COMMERCIAL

## 2020-03-19 DIAGNOSIS — D62 ANEMIA DUE TO BLOOD LOSS, ACUTE: Primary | ICD-10-CM

## 2020-03-19 LAB
AMPHETAMINES UR QL SCN: NEGATIVE
CANNABINOIDS UR QL: NEGATIVE
COCAINE UR QL: NEGATIVE
HGB BLD-MCNC: 10.2 G/DL (ref 11.7–15.7)
OPIATES UR QL SCN: NEGATIVE
PCP UR QL SCN: NEGATIVE

## 2020-03-19 PROCEDURE — 25000125 ZZHC RX 250

## 2020-03-19 PROCEDURE — 25000132 ZZH RX MED GY IP 250 OP 250 PS 637: Performed by: MIDWIFE

## 2020-03-19 PROCEDURE — 0HQ9XZZ REPAIR PERINEUM SKIN, EXTERNAL APPROACH: ICD-10-PCS | Performed by: MIDWIFE

## 2020-03-19 PROCEDURE — 85018 HEMOGLOBIN: CPT | Performed by: MIDWIFE

## 2020-03-19 PROCEDURE — 25000125 ZZHC RX 250: Performed by: MIDWIFE

## 2020-03-19 PROCEDURE — 36415 COLL VENOUS BLD VENIPUNCTURE: CPT | Performed by: MIDWIFE

## 2020-03-19 PROCEDURE — 80307 DRUG TEST PRSMV CHEM ANLYZR: CPT | Performed by: MIDWIFE

## 2020-03-19 PROCEDURE — 25800030 ZZH RX IP 258 OP 636: Performed by: MIDWIFE

## 2020-03-19 PROCEDURE — 25000128 H RX IP 250 OP 636

## 2020-03-19 PROCEDURE — 72200001 ZZH LABOR CARE VAGINAL DELIVERY SINGLE

## 2020-03-19 PROCEDURE — 12000001 ZZH R&B MED SURG/OB UMMC

## 2020-03-19 RX ORDER — LIDOCAINE HYDROCHLORIDE 10 MG/ML
INJECTION, SOLUTION EPIDURAL; INFILTRATION; INTRACAUDAL; PERINEURAL
Status: COMPLETED
Start: 2020-03-19 | End: 2020-03-19

## 2020-03-19 RX ORDER — OXYTOCIN 10 [USP'U]/ML
INJECTION, SOLUTION INTRAMUSCULAR; INTRAVENOUS
Status: COMPLETED
Start: 2020-03-19 | End: 2020-03-19

## 2020-03-19 RX ORDER — IBUPROFEN 800 MG/1
800 TABLET, FILM COATED ORAL EVERY 6 HOURS PRN
Status: DISCONTINUED | OUTPATIENT
Start: 2020-03-19 | End: 2020-03-20 | Stop reason: HOSPADM

## 2020-03-19 RX ORDER — LEVOTHYROXINE SODIUM 50 UG/1
50 TABLET ORAL DAILY
Status: DISCONTINUED | OUTPATIENT
Start: 2020-03-19 | End: 2020-03-19

## 2020-03-19 RX ORDER — OXYTOCIN/0.9 % SODIUM CHLORIDE 30/500 ML
340 PLASTIC BAG, INJECTION (ML) INTRAVENOUS CONTINUOUS PRN
Status: DISCONTINUED | OUTPATIENT
Start: 2020-03-19 | End: 2020-03-20 | Stop reason: HOSPADM

## 2020-03-19 RX ORDER — NALOXONE HYDROCHLORIDE 0.4 MG/ML
.1-.4 INJECTION, SOLUTION INTRAMUSCULAR; INTRAVENOUS; SUBCUTANEOUS
Status: DISCONTINUED | OUTPATIENT
Start: 2020-03-19 | End: 2020-03-20 | Stop reason: HOSPADM

## 2020-03-19 RX ORDER — MISOPROSTOL 200 UG/1
400 TABLET ORAL
Status: DISCONTINUED | OUTPATIENT
Start: 2020-03-19 | End: 2020-03-20 | Stop reason: HOSPADM

## 2020-03-19 RX ORDER — ACETAMINOPHEN 325 MG/1
650 TABLET ORAL EVERY 4 HOURS PRN
Status: DISCONTINUED | OUTPATIENT
Start: 2020-03-19 | End: 2020-03-20 | Stop reason: HOSPADM

## 2020-03-19 RX ORDER — BISACODYL 10 MG
10 SUPPOSITORY, RECTAL RECTAL DAILY PRN
Status: DISCONTINUED | OUTPATIENT
Start: 2020-03-21 | End: 2020-03-20 | Stop reason: HOSPADM

## 2020-03-19 RX ORDER — HYDROCORTISONE 2.5 %
CREAM (GRAM) TOPICAL 3 TIMES DAILY PRN
Status: DISCONTINUED | OUTPATIENT
Start: 2020-03-19 | End: 2020-03-20 | Stop reason: HOSPADM

## 2020-03-19 RX ORDER — LANOLIN 100 %
OINTMENT (GRAM) TOPICAL
Status: DISCONTINUED | OUTPATIENT
Start: 2020-03-19 | End: 2020-03-20 | Stop reason: HOSPADM

## 2020-03-19 RX ORDER — AMOXICILLIN 250 MG
2 CAPSULE ORAL 2 TIMES DAILY
Status: DISCONTINUED | OUTPATIENT
Start: 2020-03-19 | End: 2020-03-20 | Stop reason: HOSPADM

## 2020-03-19 RX ORDER — OXYTOCIN/0.9 % SODIUM CHLORIDE 30/500 ML
PLASTIC BAG, INJECTION (ML) INTRAVENOUS
Status: DISCONTINUED
Start: 2020-03-19 | End: 2020-03-19 | Stop reason: WASHOUT

## 2020-03-19 RX ORDER — OXYTOCIN 10 [USP'U]/ML
10 INJECTION, SOLUTION INTRAMUSCULAR; INTRAVENOUS
Status: DISCONTINUED | OUTPATIENT
Start: 2020-03-19 | End: 2020-03-20 | Stop reason: HOSPADM

## 2020-03-19 RX ORDER — AMOXICILLIN 250 MG
1 CAPSULE ORAL 2 TIMES DAILY
Status: DISCONTINUED | OUTPATIENT
Start: 2020-03-19 | End: 2020-03-20 | Stop reason: HOSPADM

## 2020-03-19 RX ORDER — MISOPROSTOL 200 UG/1
TABLET ORAL
Status: DISCONTINUED
Start: 2020-03-19 | End: 2020-03-19 | Stop reason: WASHOUT

## 2020-03-19 RX ORDER — OXYTOCIN/0.9 % SODIUM CHLORIDE 30/500 ML
100 PLASTIC BAG, INJECTION (ML) INTRAVENOUS CONTINUOUS
Status: DISCONTINUED | OUTPATIENT
Start: 2020-03-19 | End: 2020-03-20 | Stop reason: HOSPADM

## 2020-03-19 RX ADMIN — Medication 340 ML/HR: at 05:18

## 2020-03-19 RX ADMIN — OXYTOCIN 10 UNITS: 10 INJECTION, SOLUTION INTRAMUSCULAR; INTRAVENOUS at 02:25

## 2020-03-19 RX ADMIN — SODIUM CHLORIDE, POTASSIUM CHLORIDE, SODIUM LACTATE AND CALCIUM CHLORIDE 1000 ML: 600; 310; 30; 20 INJECTION, SOLUTION INTRAVENOUS at 05:18

## 2020-03-19 RX ADMIN — LIDOCAINE HYDROCHLORIDE 300 MG: 10 INJECTION, SOLUTION EPIDURAL; INFILTRATION; INTRACAUDAL; PERINEURAL at 02:30

## 2020-03-19 RX ADMIN — IBUPROFEN 800 MG: 800 TABLET, FILM COATED ORAL at 03:22

## 2020-03-19 ASSESSMENT — MIFFLIN-ST. JEOR: SCORE: 1345.29

## 2020-03-19 NOTE — PLAN OF CARE
Patient arrived to [facility] unit via wheelchair at 0430,with belongings, accompanied by spouse/ significant other, with infant in arms. Received report from Felix WHEATLEY RN via phone call at 0330 and checked bands. Unit and room orientation started. Call light given and within arms reach; no concerns present at this time. Continue with plan of care.

## 2020-03-19 NOTE — L&D DELIVERY NOTE
Delivery Summary  Delivery Note  Pt arrived transition labor after rapid onset < 1 hr  Pt was screaming and  when team entered room   IUP at 38 weeks gestation delivered on 2020.     delivery of a viable Male infant.  Weight : pending  Apgars of 8 at 1 minute and 9 at 5 minutes.  Labor was spontaneous.  Medications administered  in labor:  Pain Rx none; Antibiotics No;   Perineum: 1st degree  Placenta-mechanism: spontaneous, intact,  with a 3 vessel cord. IV oxytocin was given.  Estimated Blood Loss was 450.  Complications of pregnancy, labor and delivery:precipitous birth   Birth attendants:KELSEY Johnson CNM MRN# [id number]   Age: 29 year old YOB: 1990     ASSESSMENT & PLAN: routine PP care plan discharge 3/20/20       Labor Event Times    Labor onset date:  3/19/20 Onset time:   1:02 AM   Dilation complete date:  3/19/20 Complete time:   2:20 AM   Start pushing date/time:  3/19/2020 0224      Labor Length    1st Stage (hrs):  1 (min):  18   2nd Stage (hrs):  0 (min):  0   3rd Stage (hrs):  0 (min):  5      Labor Events     labor?:  No   steroids:  None  Labor Type:  Spontaneous  Predominate monitoring during 1st stage:  continuous electronic fetal monitoring     Antibiotics received during labor?:  No     Rupture identifier:  Sac 1  Rupture date/time: 3/19/20 0220   Rupture type:  Spontaneous rupture of membranes occuring during spontaneous labor or augmentation  Fluid color:  Clear  Fluid odor:  Normal     1:1 continuous labor support provided by?:  RN Labor partogram used?:  no      Delivery/Placenta Date and Time    Delivery Date:  3/19/20 Delivery Time:   2:20 AM   Placenta Date/Time:  3/19/2020  2:25 AM  Oxytocin given at the time of delivery:  after delivery of placenta     Vaginal Counts     Initial count performed by 2 team members:   Two Team Members   Devora COLE       [employer] S[employer]   Initial  counts 2 1 5    Added to count       Final counts 2 1 5    Placed during labor Accounted for at the end of labor   No    No    No     Final count performed by 2 team members:   Two Team Members   Devora COLE       Final count correct?:  Yes     Apgars    Living status:  Living   1 Minute 5 Minute 10 Minute 15 Minute 20 Minute   Skin color: 1  1       Heart rate: 2  2       Reflex irritability: 1  2       Muscle tone: 2  2       Respiratory effort: 2  2       Total: 8  9          Cord    Vessels:  3 Vessels Complications:  None   Cord Blood Disposition:  Lab Gases Sent?:  No      P[place]ham Resuscitation    Methods:  None   Care at Delivery:  Data: Male infant born at 0220. Devora Hurtado CNM at delivery.   Action: No interventions needed. APGAR 8/9. Spontaneous cry, stimulated, placed on mothers abdomen. Delayed cord clamping. Cord cut. Placed on mothers chest, warm blankets and hat applied.  Response: Stable . Positive bonding behaviors observed.         Skin to Skin and Feeding Plan    Skin to skin initiation date/time: 1/3/1841    Skin to skin with:  Mother  Skin to skin end date/time:     How do you plan to feed your baby:  Breastfeeding     Labor Events and Shoulder Dystocia    Fetal Tracing Prior to Delivery:  Category 1  Shoulder dystocia present?:  Neg     Delivery (Maternal) (Provider to Complete) ([id number])    Episiotomy:  None  Perineal lacerations:  1st    Est. blood loss (mL):  450     Blood Loss  Mother: InderjitEvelia #[id number]   Start of Mother's Information    IO Blood Loss  20 0102 - 20 0309    EBL (mL) Hospital Encounter 450 mL    Total  450 mL         End of Mother's Information  Mother: Inderjit Altamirano #[id number]         Delivery - Provider to Complete ([id number])    Delivering clinician:  Radha Hurtado APRN CNCORRY  CNM Care:  Exclusive CNM care in labor  Attempted Delivery Types (Choose all that apply):  Spontaneous Vaginal Delivery  Delivery Type (Choose the 1 that will go to the  Birth History):  Vaginal, Spontaneous          Placenta    Delayed Cord Clamping:  Done  Date/Time:  3/19/2020  2:25 AM  Removal:  Spontaneous  Disposition:  Hospital disposal     Anesthesia    Method:  None          Presentation and Position    Presentation:  Vertex   Occiput Anterior           KELSEY Johnson CNM

## 2020-03-19 NOTE — PROVIDER NOTIFICATION
03/19/20 1751   Provider Notification   Provider Name/Title PADMINI Becerril   Method of Notification Electronic Page   Request Evaluate-Remote   Notification Reason Other   Patient reporting that her PIV is itching and wants it removed. Denies any dizziness with ambulation, BP WNL. Is it okay if her PIV is d/c'd? Thanks.

## 2020-03-19 NOTE — PLAN OF CARE
VSS ex BP's low - see flowsheet. Pt asymptomatic. Postpartum assessment WNL. Ambulated to bathroom with stand by assist. Declining pain medication at this time. Tolerating regular diet. Breastfeeding infant with minimal assist. Spouse at bedside and supportive. Continue plan of care.

## 2020-03-19 NOTE — H&P
"ADMIT NOTE  =================  38w1d    Evelia Jansen is a 29 year old female with an No LMP recorded. Patient is pregnant. and Estimated Date of Delivery: 2020 [facility] at 2:52 AM with in transition labor at 0153.  CNM called to room pt screaming and full crown     HPI  ================  Pt reports strong labor began around 0105 with some vaginal bleeding and they came to hospital   Contractions- strong  Fetal movement- active  ROM- yes, small, clear  With delivery   Vaginal bleeding- small  GBS- negative  FOB- is involved,     Weight gain- 140 - 108 lbs, Total weight gain- 32 lbs  Height- 5'4\"  BMI- 18  First prenatal visit at 9 weeks, Total visits- 11    PROBLEM LIST  =================  Patient Active Problem List    Diagnosis Date Noted     Vaginal delivery 2020     Priority: Medium     Thrombocytopenia (H) 2020     Priority: Medium     Plt's 3/6/2020 139, weekly CBC       Circumvallate placenta- needs repeat us at 28 weeks  2019     Priority: Medium     19: Placenta anterior, no previa. Circumvallate placenta.  Recommend a growth US in your office at 28 weeks to reevaluate the placental cord insertion and fetal growth  19- US- 56% growth.    35wks:   Presentation - cephalic USEGA = 35 3/7 weeks.  EFW = 2,514 grams, 38 % for 35 weeks          Marginal insertion of umbilical cord affecting management of mother 2019     Priority: Medium     19: Level II US-patient reports that she was told marginal insertion-formal report pending.        Supervision of high risk pregnancy in third trimester 09/15/2019     Priority: Medium       - Last pap 2017 - NIL    [facility] CNM pt         Subclinical hypothyroidism 2019     Priority: Medium     2019 - Pt considering pregnancy. Recommend starting Synthroid 25mcg daily for sub clinical hypothyroidism and recheck levels in 4-6 weeks.   Mychart sent and voicemail left  9/3/19- on 25mcg, " refill sent, thyroid labs ordered   2019 - TSH normal  19- TSH low 0.23, T4 1.11.   1/10/20: Decreased to 62.5mcg on 1/10-RECHECK in 4 weeks        Pre-diabetes 2019     Priority: Medium     2019 - Encourage diet and exercise changes for better glucose control. Mychart sent. Voicemail left to call back regarding labs.    19- failed . Needs 3 hour GTT_passed ___         History of gestational diabetes 2018     Priority: Medium     9/3/19: hx of gestational dm- did postpartum 2 hour glucose test - failed 1/3 values          HISTORIES  ============  No Known Allergies  Past Medical History:   Diagnosis Date     History of gestational diabetes      Hypothyroidism 2017    25mcg levothyroxine now     Past Surgical History:   Procedure Laterality Date     tooth extraction  2019   .  History reviewed. No pertinent family history.  Social History     Tobacco Use     Smoking status: Never Smoker     Smokeless tobacco: Never Used   Substance Use Topics     Alcohol use: No     OB History    Para Term  AB Living   2 1 1 0 0 1   SAB TAB Ectopic Multiple Live Births   0 0 0 0 1      # Outcome Date GA Lbr Nicho/2nd Weight Sex Delivery Anes PTL Lv   2 Current            1 Term 18 37w5d 01:20  00:51 2.92 kg (6 lb 7 oz) F Vag-Spont EPI N MARTHA      Complications: GBS      Name: John      Apgar1: 9  Apgar5: 9        LABS:   ===========  Prenatal Labs:  Rhogam not indicated   Lab Results   Component Value Date    ABO B 2019    RH Pos 2019    AS Neg 2019    HEPBANG Nonreactive 2019    TREPAB Negative 2018    RUQIGG 86 2019    HGB 12.9 2020     Rubella immune  Lab Results   Component Value Date    GBS Negative 2020     Other labs:  No results found for this or any previous visit (from the past 24 hour(s)).    ROS  =========  Pt denies significant respiratory, cardiovacular, GI, or muscular/skeletalcomplaints.    See RN data  "base ROS.       PHYSICAL EXAM:deferred pt delivering see delivery note ===============  BP 95/60   Temp 97.7  F (36.5  C) (Oral)   Resp 16   Ht 1.626 m (5' 4.02\")   Wt 63.5 kg (140 lb)   BMI 24.02 kg/m    General appearance: uncomfortable with contractions  between contractions.     # Pain Assessment:  Current Pain Score 3/19/2020   Patient currently in pain? yes   Pain location -   Pain descriptors -   Altamirano s pain level was assessed and she currently denies pain.        ASSESSMENT:  ==============  IUP @ 38w1d admitted in transition labor   NST NOT DONE  Fetal Heart Rate - category one  GBS- negative    Patient Active Problem List   Diagnosis     History of gestational diabetes     Subclinical hypothyroidism     Pre-diabetes     Supervision of high risk pregnancy in third trimester     Marginal insertion of umbilical cord affecting management of mother     Circumvallate placenta- needs repeat us at 28 weeks      Thrombocytopenia (H)     Vaginal delivery       PLAN:  ===========  Admit - see IP orders  Anticipate  pt delivered in  Triage shortly after arriving   KELSEY Johnson CNM  "

## 2020-03-19 NOTE — PLAN OF CARE
Vaginal Delivery Note   of viable male with Devora Hurtado CNM in attendance.  NICU/Nursery RN Mohamud MOLINA present.  Infant with spontaneous cry, to mother's abdomen, dried and stimulated.  APGAR at 1 minute:  8 and APGAR at 5 minutes:  9.  Placenta delivered with out complication, IM placenta given to control bleeding, first degree laceration, with repair, mirella cares provided.  Mother and baby in stable condition.

## 2020-03-19 NOTE — PLAN OF CARE
Data: Evelia Jansen transferred to [facility] via wheelchair at 0427. Baby transferred via parent's arms.  Action: Receiving unit notified of transfer: Yes. Patient and family notified of room change. Report given to NESTOR Murray at 0351. Belongings sent to receiving unit. Accompanied by Registered Nurse. Oriented patient to surroundings. Call light within reach. ID bands double-checked with receiving RN.  Response: Patient tolerated transfer and is currently stable. While in [facility], patient began to feel hot. Receiving RN and writer were able to get patient to floor and administer blow by oxygen and apple juice. Provider called to room, order for lactated ringers IV bolus of 500mL and IV oxytocin. Patient moved to bed, medications administered. Pt currently stable.

## 2020-03-19 NOTE — PROVIDER NOTIFICATION
03/19/20 0210   Provider Notification   Provider Name/Title KRISTIE Hurtado CNM   Method of Notification Electronic Page   Request Evaluate in Person   Notification Reason Patient Arrived   Pt here for ctx, q5 minutes per patient. No leaking of fluids, some bleeding. Pretty uncomfortable.

## 2020-03-19 NOTE — PLAN OF CARE
Data: Upon transfer to bed on [facility], pt was found to not have underwear or a pad on. During first fundal check, chucks became bloody so nurse advised that we try to get up to go to the bathroom and void/clean perineum and put on underwear and pad. At 0440 patient ambulated to bathroom w/ stand-by assistance and voided w/o difficulty. After nurse helped pt put on pad and underwear, pt was washing hands and began to feel faint. At 0445 pt then fainted while resting forearms and head on sink counter, nurse was supporting patient in that position and placed an ice pack on patient's neck to which pt woke up. Nurse called for Felix RN (L&D RN who transferred pt) who was outside the bathroom for help. Both RNs supported pt on either side, pt stated she felt okay to walk to bed but fainted again after a couple steps. Both RNs were able to gently assist pt's descent to sitting on ground. Called for more help from charge RN and NST. Placed oxygen mask on pt at 0448 and pt started to become conscious. Pt drank apple juice and said she already felt better. Sat on ground in doorway of bathroom with pt for ~8 minutes. Paged provider PADMINI Hurtado to come assess. Called for IV to be placed. Bed was moved to be closer and patient was able to stand with assistance and pivot to get into bed by 0500. PADMINI Hurtado ordered 500mL LR bolus, followed by 125mL/hr infusion to complete the 1000mL bag and for IV pitocin to run 340mL/hr for 30min, followed by 100mL/hr infusion to complete the 500mL bag.   BP tends to run soft, and was not significantly low after fainting. See flowsheet. Postpartum checks within normal limits - fundus firm U/1. Patient eating and drinking normally. No apparent signs of infection.  Patient breastfeeding baby with a little assist in latching.   Action: Patient denied need for pain medication, but did use a hot pack for uterine cramping. Provided education to patient on call light use, not getting out of bed w/o  help, expected bleeding and safe sleep.   Response: Positive attachment behaviors observed with infant. Support persons Paige present.   Plan: continue plan of care.

## 2020-03-20 VITALS
BODY MASS INDEX: 23.9 KG/M2 | OXYGEN SATURATION: 97 % | DIASTOLIC BLOOD PRESSURE: 62 MMHG | WEIGHT: 140 LBS | RESPIRATION RATE: 16 BRPM | TEMPERATURE: 98 F | HEART RATE: 76 BPM | SYSTOLIC BLOOD PRESSURE: 98 MMHG | HEIGHT: 64 IN

## 2020-03-20 LAB — HGB BLD-MCNC: 9 G/DL (ref 11.7–15.7)

## 2020-03-20 PROCEDURE — 25000132 ZZH RX MED GY IP 250 OP 250 PS 637: Performed by: MIDWIFE

## 2020-03-20 PROCEDURE — 25000132 ZZH RX MED GY IP 250 OP 250 PS 637: Performed by: ADVANCED PRACTICE MIDWIFE

## 2020-03-20 PROCEDURE — 85018 HEMOGLOBIN: CPT | Performed by: MIDWIFE

## 2020-03-20 PROCEDURE — 36415 COLL VENOUS BLD VENIPUNCTURE: CPT | Performed by: MIDWIFE

## 2020-03-20 RX ORDER — MULTIVIT WITH MINERALS/LUTEIN
250 TABLET ORAL DAILY
Status: DISCONTINUED | OUTPATIENT
Start: 2020-03-20 | End: 2020-03-20 | Stop reason: HOSPADM

## 2020-03-20 RX ORDER — FERROUS SULFATE 325(65) MG
325 TABLET ORAL DAILY
Qty: 90 TABLET | Refills: 1 | Status: SHIPPED | OUTPATIENT
Start: 2020-03-20 | End: 2021-04-09

## 2020-03-20 RX ORDER — AMOXICILLIN 250 MG
1 CAPSULE ORAL DAILY
Qty: 100 TABLET | Refills: 0 | Status: SHIPPED | OUTPATIENT
Start: 2020-03-20 | End: 2021-04-09

## 2020-03-20 RX ORDER — IBUPROFEN 600 MG/1
600 TABLET, FILM COATED ORAL EVERY 6 HOURS PRN
Qty: 60 TABLET | Refills: 0 | Status: SHIPPED | OUTPATIENT
Start: 2020-03-20 | End: 2020-05-14

## 2020-03-20 RX ORDER — ACETAMINOPHEN 325 MG/1
650 TABLET ORAL EVERY 6 HOURS PRN
Qty: 100 TABLET | Refills: 0 | Status: SHIPPED | OUTPATIENT
Start: 2020-03-20 | End: 2021-04-09

## 2020-03-20 RX ORDER — LANOLIN ALCOHOL/MO/W.PET/CERES
1000 CREAM (GRAM) TOPICAL DAILY
Status: DISCONTINUED | OUTPATIENT
Start: 2020-03-20 | End: 2020-03-20 | Stop reason: HOSPADM

## 2020-03-20 RX ORDER — FERROUS SULFATE 325(65) MG
325 TABLET ORAL DAILY
Status: DISCONTINUED | OUTPATIENT
Start: 2020-03-20 | End: 2020-03-20 | Stop reason: HOSPADM

## 2020-03-20 RX ADMIN — Medication 62.5 MCG: at 07:59

## 2020-03-20 RX ADMIN — CYANOCOBALAMIN TAB 1000 MCG 1000 MCG: 1000 TAB at 08:07

## 2020-03-20 RX ADMIN — FERROUS SULFATE TAB 325 MG (65 MG ELEMENTAL FE) 325 MG: 325 (65 FE) TAB at 08:07

## 2020-03-20 NOTE — PLAN OF CARE
Pt is stable. Vital sign stable and FF at U/2 with scant lochia. Pt is up in the room ambulating slowly due to mirella discomfort. Complains of cramping with breastfeeding, but do not want any pain medications. Pt is breastfeeding well and nipples are in tact per pt. Denies pain. Assessed pt for pain and offer pain medication. Checked latch and flange baby's lip. Encouraged pt to soak in the tub twice a day and to use tucks for comfort. Reviewed teaching and discharge instructions with pt. Checked ID band. Pt was given discharge medications with instructions, breast pump and copies of the discharge papers. Pt is discharge home today with baby in a car seat.

## 2020-03-20 NOTE — TELEPHONE ENCOUNTER
Altamirano who is 11 6/7 wks GA calling with concerns since she continues to have brown vaginal discharge now enough that she needs to wear a pad. She reports having this discharge even before US done 9/3/19. Denies any pain or cramping. US done 9/3/19 WNL,viable IUP . She denies any sexual intercourse during this time period. She has MFM appt and  US next week. Instructed her to schedule appt with midwife this week but keep future appt as scheduled.Pt indicated understanding and agreed with plan.  Transferred to  to schedule.  
20-Mar-2020 19:21

## 2020-03-20 NOTE — DISCHARGE SUMMARY
[facility] Discharge Summary    Evelia Jansen MRN# [id number]   Age: 29 year old YOB: 1990     Date of Admission:  3/19/2020  Date of Discharge::  3/20/2020  Admitting Physician:  KELSEY Johnson CNM  Discharge Physician:  KELSEY Yeboah CNM      Home clinic: [facility]          Admission Diagnoses:   Bytosyxst67/19/2020CTX  Vaginal delivery          Discharge Diagnosis:   Normal spontaneous vaginal delivery  Intrauterine pregnancy at 38 weeks gestation   Anemia due to acute blood loss       Procedures:   Procedure(s): No additional procedures performed       No other significant procedures performed during this admission           Medications Prior to Admission:     Medications Prior to Admission   Medication Sig Dispense Refill Last Dose     levothyroxine (SYNTHROID/LEVOTHROID) 25 MCG tablet Take 0.5 tablets (12.5 mcg) by mouth daily 45 tablet 0      levothyroxine (SYNTHROID/LEVOTHROID) 50 MCG tablet Take 1 tablet (50 mcg) by mouth daily 45 tablet 0      Misc. Devices (BREAST PUMP) MISC 1 each daily as needed 1 each 0      Prenatal Vit-Fe Fumarate-FA (PRENATAL VITAMIN) 27-0.8 MG TABS                 Discharge Medications:     Current Discharge Medication List      START taking these medications    Details   acetaminophen (TYLENOL) 325 MG tablet Take 2 tablets (650 mg) by mouth every 6 hours as needed for mild pain Start after Delivery.  Qty: 100 tablet, Refills: 0    Associated Diagnoses: Vaginal delivery      cyanocobalamin (CYANOCOBALAMIN) 1000 MCG tablet Take 1 tablet (1,000 mcg) by mouth daily  Qty: 90 tablet, Refills: 1    Associated Diagnoses: Anemia due to blood loss, acute      ferrous sulfate (FEROSUL) 325 (65 Fe) MG tablet Take 1 tablet (325 mg) by mouth daily  Qty: 90 tablet, Refills: 1    Associated Diagnoses: Anemia due to blood loss, acute      ibuprofen (ADVIL/MOTRIN) 600 MG tablet Take 1 tablet (600 mg) by mouth every 6 hours as needed for  moderate pain Start after delivery  Qty: 60 tablet, Refills: 0    Associated Diagnoses: Vaginal delivery      senna-docusate (SENOKOT-S/PERICOLACE) 8.6-50 MG tablet Take 1 tablet by mouth daily Start after delivery.  Qty: 100 tablet, Refills: 0    Associated Diagnoses: Vaginal delivery      vitamin C (ASCORBIC ACID) 250 MG TABS tablet Take 1 tablet (250 mg) by mouth daily  Qty: 90 tablet, Refills: 1    Associated Diagnoses: Anemia due to blood loss, acute         CONTINUE these medications which have NOT CHANGED    Details   !! levothyroxine (SYNTHROID/LEVOTHROID) 25 MCG tablet Take 0.5 tablets (12.5 mcg) by mouth daily  Qty: 45 tablet, Refills: 0    Associated Diagnoses: Subclinical hypothyroidism      !! levothyroxine (SYNTHROID/LEVOTHROID) 50 MCG tablet Take 1 tablet (50 mcg) by mouth daily  Qty: 45 tablet, Refills: 0    Associated Diagnoses: Subclinical hypothyroidism      Misc. Devices (BREAST PUMP) MISC 1 each daily as needed  Qty: 1 each, Refills: 0    Associated Diagnoses: Supervision of high risk pregnancy in first trimester      Prenatal Vit-Fe Fumarate-FA (PRENATAL VITAMIN) 27-0.8 MG TABS        !! - Potential duplicate medications found. Please discuss with provider.                Consultations:   No consultations were requested during this admission          Brief History of Labor:   Delivery Note  Pt arrived transition labor after rapid onset < 1 hr  Pt was screaming and  when team entered room   IUP at 38 weeks gestation delivered on 2020.     delivery of a viable Male infant.  Weight : pending  Apgars of 8 at 1 minute and 9 at 5 minutes.  Labor was spontaneous.  Medications administered  in labor:  Pain Rx none; Antibiotics No;   Perineum: 1st degree  Placenta-mechanism: spontaneous, intact,  with a 3 vessel cord. IV oxytocin was given.  Estimated Blood Loss was 450.  Complications of pregnancy, labor and delivery:precipitous birth   Birth attendants:KELSEY Johnson CNM      Assessment Day of Discharge      Breasts: soft, filling  Nipples: intact  Fundus: firm, -1/u  Abdomen: soft  Lochia: minimal   Perineum:intact  Legs:negative edema           Hospital Course:   The patient's hospital course was unremarkable.  On discharge, her pain was well controlled. Vaginal bleeding is similar to peak menstrual flow.  Voiding without difficulty.  Ambulating well and tolerating a normal diet.  No fever.  Breastfeeding well.  Infant is stable.  No bowel movement yet. She was discharged on post-partum day #1.    Post-partum hemoglobin:   Hemoglobin   Date Value Ref Range Status   03/20/2020 9.0 (L) 11.7 - 15.7 g/dL Final             Discharge Instructions and Follow-Up:   Discharge diet: Regular   Discharge activity: Pelvic rest: abstain from intercourse and do not use tampons for 6 week(s)   Discharge follow-up: Follow up with [facility] in 6 weeks   Wound care: Drink plenty of fluids   Iron supplements as prescribed         Discharge Disposition:   Discharged to home        KELSEY Yeboah CNM

## 2020-03-20 NOTE — PLAN OF CARE
Data: Vital signs within normal limits. Postpartum checks within normal limits - see flow record. Patient eating and drinking normally. Patient able to empty bladder independently and is up ambulating, patient denies any dizziness ambulating. PIV removed as patient was reporting a lot of itchiness at IV site, provider (PADMINI Becerril) aware. No apparent signs of infection. Patient performing self cares and is able to care for infant. Breastfeeding baby on demand with minimal assistance in latching baby, unable to express additional colostrum from breast.  Action: Patient reports occasional cramping with breastfeeding but has declined pain interventions (including pain medications and/or hot packs).   Response: Positive attachment behaviors observed with infant. Support person, : Paige, present.   Plan: Continue with the plan of care.

## 2020-03-20 NOTE — PLAN OF CARE
VSS. Fundus firm, midline at U/3. Lochia rubra and scant. Up ad jg and tolerating regular diet. Denies dizziness and very steady on feet. Denies pain and declining pain meds throughout shift. Breastfeeding with minimal assist. Bonding well with baby. Continue current plan of care.

## 2020-05-14 ENCOUNTER — OFFICE VISIT (OUTPATIENT)
Dept: OBGYN | Facility: CLINIC | Age: 30
End: 2020-05-14
Attending: ADVANCED PRACTICE MIDWIFE
Payer: COMMERCIAL

## 2020-05-14 VITALS
HEIGHT: 64 IN | SYSTOLIC BLOOD PRESSURE: 102 MMHG | DIASTOLIC BLOOD PRESSURE: 68 MMHG | WEIGHT: 119.3 LBS | HEART RATE: 65 BPM | BODY MASS INDEX: 20.37 KG/M2

## 2020-05-14 DIAGNOSIS — Z12.4 SCREENING FOR MALIGNANT NEOPLASM OF CERVIX: ICD-10-CM

## 2020-05-14 DIAGNOSIS — E03.8 SUBCLINICAL HYPOTHYROIDISM: ICD-10-CM

## 2020-05-14 PROBLEM — R73.03 PRE-DIABETES: Status: RESOLVED | Noted: 2019-02-20 | Resolved: 2020-05-14

## 2020-05-14 PROBLEM — D69.6 THROMBOCYTOPENIA (H): Status: RESOLVED | Noted: 2020-03-09 | Resolved: 2020-05-14

## 2020-05-14 PROBLEM — O43.119 CIRCUMVALLATE PLACENTA: Status: RESOLVED | Noted: 2019-11-07 | Resolved: 2020-05-14

## 2020-05-14 PROBLEM — O43.199 MARGINAL INSERTION OF UMBILICAL CORD AFFECTING MANAGEMENT OF MOTHER: Status: RESOLVED | Noted: 2019-11-05 | Resolved: 2020-05-14

## 2020-05-14 PROBLEM — O09.93 SUPERVISION OF HIGH RISK PREGNANCY IN THIRD TRIMESTER: Status: RESOLVED | Noted: 2019-09-15 | Resolved: 2020-05-14

## 2020-05-14 PROBLEM — Z86.32 HISTORY OF GESTATIONAL DIABETES: Status: RESOLVED | Noted: 2018-09-14 | Resolved: 2020-05-14

## 2020-05-14 LAB — TSH SERPL DL<=0.005 MIU/L-ACNC: 3.77 MU/L (ref 0.4–4)

## 2020-05-14 PROCEDURE — G0123 SCREEN CERV/VAG THIN LAYER: HCPCS | Performed by: ADVANCED PRACTICE MIDWIFE

## 2020-05-14 PROCEDURE — G0463 HOSPITAL OUTPT CLINIC VISIT: HCPCS | Mod: ZF

## 2020-05-14 PROCEDURE — G0145 SCR C/V CYTO,THINLAYER,RESCR: HCPCS | Performed by: ADVANCED PRACTICE MIDWIFE

## 2020-05-14 PROCEDURE — 36415 COLL VENOUS BLD VENIPUNCTURE: CPT | Performed by: ADVANCED PRACTICE MIDWIFE

## 2020-05-14 PROCEDURE — 84443 ASSAY THYROID STIM HORMONE: CPT | Performed by: ADVANCED PRACTICE MIDWIFE

## 2020-05-14 ASSESSMENT — MIFFLIN-ST. JEOR: SCORE: 1251.14

## 2020-05-14 ASSESSMENT — PAIN SCALES - GENERAL: PAINLEVEL: NO PAIN (0)

## 2020-05-14 NOTE — PROGRESS NOTES
"Nursing Notes:   Sav Omalley LPN  2020 11:22 AM  Addendum  Chief Complaint   Patient presents with     Postpartum Care     SUBJECTIVE:   Evelia Jansen is here for her 6-week postpartum checkup.     PHQ-9 score:    Hx of Abuse:       Delivery Date: 3/19/20.    Delivering provider:  Devora Hurtado CNM.    Type of delivery:  .  Perineum:  tear, with repair and concerned with healing.     Delivery complications: None  Infant gender:  boy, weight 3.5 kg   Feeding Method:  .  Complications reported with feeding:  none, infant thriving .    Bleeding:  Light.  Duration:  Comes and goes.  Menses resumed:  No  Bowel/Urinary problems:  Yes hard bowels    Contraception Planned:  Condoms -- is she planning pregnancy? no  She  has not had intercourse since delivery..         ================================================================  ROS: 10 point ROS neg other than the symptoms noted above in the HPI.   Reports feeling a \"bump\" in the posterior portion of her vagina.    EXAM:  /68   Pulse 65   Ht 1.626 m (5' 4\")   Wt 54.1 kg (119 lb 4.8 oz)   BMI 20.48 kg/m      General: healthy, alert and no distress  Psych: NEGATIVE  Last PHQ-9 score on record= No Value exists for the : HP#PHQ9  Breasts:  Lactating, Nipples intact with no lesions, Non-tender and No S/S of yeast or mastitis  Abdomen: Benign, Soft, flat, non-tender, No masses, organomegaly and Diastasis less than 1-2 FB  Incision:  None   Vulva:  Normal genitalia and Bartholin's, Urethra, [place]'s normal  Vagina:  small rectocele and epis/repair well healed  Cervix:  Multiparous, and no lesions.    Uterus:  fully involuted and non-tender    Adnexa:  Within normal limits and No masses, nodularity, tenderness  Recto-vaginal:   anus normal        ASSESSMENT:      Normal postpartum exam after   Pregnancy was complicated by:  none.      PLAN:  Orders Placed This Encounter   Procedures     Obtaining, preparing and conveyance of cervical or vaginal " smear to laboratory.     TSH with free T4 reflex     Pap imaged thin layer screen reflex to HPV if ASCUS - recommended age 25 - 29 years         Risks and benefits of prescribed medications discussed.  Medication instructions reviewed.  Discussed calcium intake, vitamins and supplements including Vitamin D  Exercise encouraged  Follow up in 1 year  KELSEY Yeboah CNM

## 2020-05-14 NOTE — LETTER
"2020       RE: Evelia Jansen  [address]  [facility] [address]     Dear Colleague,    Thank you for referring your patient, Evelia Jansen, to the [facility] at [facility]. Please see a copy of my visit note below.    Nursing Notes:   Sav Omalley LPN  2020 11:22 AM  Addendum  Chief Complaint   Patient presents with     Postpartum Care     SUBJECTIVE:   Evelia Jansen is here for her 6-week postpartum checkup.     PHQ-9 score:    Hx of Abuse:       Delivery Date: 3/19/20.    Delivering provider:  Devora Hurtado CNM.    Type of delivery:  .  Perineum:  tear, with repair and concerned with healing.     Delivery complications: None  Infant gender:  boy, weight 3.5 kg   Feeding Method:  .  Complications reported with feeding:  none, infant thriving .    Bleeding:  Light.  Duration:  Comes and goes.  Menses resumed:  No  Bowel/Urinary problems:  Yes hard bowels    Contraception Planned:  Condoms -- is she planning pregnancy? no  She  has not had intercourse since delivery..         ================================================================  ROS: 10 point ROS neg other than the symptoms noted above in the HPI.   Reports feeling a \"bump\" in the posterior portion of her vagina.    EXAM:  /68   Pulse 65   Ht 1.626 m (5' 4\")   Wt 54.1 kg (119 lb 4.8 oz)   BMI 20.48 kg/m      General: healthy, alert and no distress  Psych: NEGATIVE  Last PHQ-9 score on record= No Value exists for the : HP#PHQ9  Breasts:  Lactating, Nipples intact with no lesions, Non-tender and No S/S of yeast or mastitis  Abdomen: Benign, Soft, flat, non-tender, No masses, organomegaly and Diastasis less than 1-2 FB  Incision:  None   Vulva:  Normal genitalia and Bartholin's, Urethra, [place]'s normal  Vagina:  small rectocele and epis/repair well healed  Cervix:  Multiparous, and no lesions.    Uterus:  fully involuted and non-tender    Adnexa:  Within normal " limits and No masses, nodularity, tenderness  Recto-vaginal:   anus normal        ASSESSMENT:      Normal postpartum exam after   Pregnancy was complicated by:  none.      PLAN:  Orders Placed This Encounter   Procedures     Obtaining, preparing and conveyance of cervical or vaginal smear to laboratory.     TSH with free T4 reflex     Pap imaged thin layer screen reflex to HPV if ASCUS - recommended age 25 - 29 years         Risks and benefits of prescribed medications discussed.  Medication instructions reviewed.  Discussed calcium intake, vitamins and supplements including Vitamin D  Exercise encouraged  Follow up in 1 year  KELSEY Yeboah CNM      Again, thank you for allowing me to participate in the care of your patient.      Sincerely,    KELSEY Yeboah CNM

## 2020-05-14 NOTE — NURSING NOTE
Chief Complaint   Patient presents with     Postpartum Care     SUBJECTIVE:   Evelia Jansen is here for her 6-week postpartum checkup.     PHQ-9 score:    Hx of Abuse:       Delivery Date: 3/19/20.    Delivering provider:  Devora Hurtado CNM.    Type of delivery:  .  Perineum:  tear, with repair and concerned with healing.     Delivery complications: None  Infant gender:  boy, weight 3.5 kg   Feeding Method:  .  Complications reported with feeding:  none, infant thriving .    Bleeding:  Light.  Duration:  Comes and goes.  Menses resumed:  No  Bowel/Urinary problems:  Yes hard bowels    Contraception Planned:  None -- is she planning pregnancy? no  She  has not had intercourse since delivery..

## 2020-05-18 LAB
COPATH REPORT: NORMAL
PAP: NORMAL

## 2020-09-21 ENCOUNTER — MEDICAL CORRESPONDENCE (OUTPATIENT)
Dept: HEALTH INFORMATION MANAGEMENT | Facility: CLINIC | Age: 30
End: 2020-09-21

## 2020-10-19 ENCOUNTER — IMMUNIZATION (OUTPATIENT)
Dept: NURSING | Facility: CLINIC | Age: 30
End: 2020-10-19
Payer: COMMERCIAL

## 2020-10-19 DIAGNOSIS — Z23 NEED FOR PROPHYLACTIC VACCINATION AND INOCULATION AGAINST INFLUENZA: Primary | ICD-10-CM

## 2020-10-19 PROCEDURE — 90471 IMMUNIZATION ADMIN: CPT

## 2020-10-19 PROCEDURE — 90686 IIV4 VACC NO PRSV 0.5 ML IM: CPT

## 2020-10-19 PROCEDURE — 99207 PR NO CHARGE NURSE ONLY: CPT

## 2021-04-03 ASSESSMENT — ANXIETY QUESTIONNAIRES
1. FEELING NERVOUS, ANXIOUS, OR ON EDGE: SEVERAL DAYS
6. BECOMING EASILY ANNOYED OR IRRITABLE: SEVERAL DAYS
3. WORRYING TOO MUCH ABOUT DIFFERENT THINGS: SEVERAL DAYS
2. NOT BEING ABLE TO STOP OR CONTROL WORRYING: NOT AT ALL
GAD7 TOTAL SCORE: 4
7. FEELING AFRAID AS IF SOMETHING AWFUL MIGHT HAPPEN: SEVERAL DAYS
4. TROUBLE RELAXING: NOT AT ALL
5. BEING SO RESTLESS THAT IT IS HARD TO SIT STILL: NOT AT ALL
GAD7 TOTAL SCORE: 4
7. FEELING AFRAID AS IF SOMETHING AWFUL MIGHT HAPPEN: SEVERAL DAYS

## 2021-04-04 ASSESSMENT — ANXIETY QUESTIONNAIRES: GAD7 TOTAL SCORE: 4

## 2021-04-08 NOTE — PROGRESS NOTES
"Evelia Jansen is a 30 year old  female with PMH of subclinical hypothyroidism, prediabetes and anemia s/p normal vaginal delivery (2020) who presents to the clinic with concerns of her thyroid status, pre gestational diabetes and pelvic \"pressure\" symptoms post uterine prolapse.    Subjective  Upon further investigation, Evelia revealed that she came into the clinic to get her thyroid level and glucose levels checked because she was concerned that she would not be able to get the COVID 19 vaccine otherwise.     Review Of Systems  Constitutional: Endorses fatigue, denies chills, fever, weight loss or gain  Respiratory: No shortness of breath, dyspnea on exertion, cough, or hemoptysis   Cardiovascular: Denies chest pain, syncope, dizziness,   Gastrointestinal: Denies abdominal pain, constipation, diarrrhea  Genitourinary: Denies urinary incontinence, dysuria, burning, itching  Musculoskeletal: Endorses back pain, denies other muscle aches/pains  Psychiatric: Denies depression and anxiety but endorses increased stress with both parents working from home  Endocrine: Denies hot/cold intolerance, sweating, polyuria.    Objective  /83   Pulse 66   Wt 49 kg (108 lb)   LMP 2021   BMI 18.54 kg/m      Assessment  Evelia Jansen is a 30 year old  female who presented to the clinic asymptomatic with concerns regarding her thyroid status and gestational diabetes found to have a mild cystocele. No evidence of uterine prolapse.     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 She would still like " "her thyroid status evaluated which is reasonable.  - TSH, Free T4 to evaluate thyroid status  Back Pain  Most likely secondary to \"always carrying the baby around\"     Answers for HPI/ROS submitted by the patient on 4/3/2021   ALEX 7 TOTAL SCORE: 4    Lily Robert CNM    "

## 2021-04-09 ENCOUNTER — OFFICE VISIT (OUTPATIENT)
Dept: OBGYN | Facility: CLINIC | Age: 31
End: 2021-04-09
Attending: ADVANCED PRACTICE MIDWIFE
Payer: COMMERCIAL

## 2021-04-09 VITALS
WEIGHT: 108 LBS | HEART RATE: 66 BPM | SYSTOLIC BLOOD PRESSURE: 120 MMHG | DIASTOLIC BLOOD PRESSURE: 83 MMHG | BODY MASS INDEX: 18.54 KG/M2

## 2021-04-09 DIAGNOSIS — N81.10 FEMALE CYSTOCELE: ICD-10-CM

## 2021-04-09 DIAGNOSIS — N81.0 FEMALE URETHROCELE: ICD-10-CM

## 2021-04-09 DIAGNOSIS — E03.8 SUBCLINICAL HYPOTHYROIDISM: Primary | ICD-10-CM

## 2021-04-09 DIAGNOSIS — Z87.898 HISTORY OF PREDIABETES: ICD-10-CM

## 2021-04-09 PROCEDURE — 99203 OFFICE O/P NEW LOW 30 MIN: CPT | Performed by: ADVANCED PRACTICE MIDWIFE

## 2021-04-09 PROCEDURE — G0463 HOSPITAL OUTPT CLINIC VISIT: HCPCS

## 2021-04-09 ASSESSMENT — PAIN SCALES - GENERAL: PAINLEVEL: MILD PAIN (2)

## 2021-04-09 NOTE — NURSING NOTE
Wants to have tsh checked also glucose checked hx  gdm  Also has pelvic pain with walking  Or carrying baby for a long whole causes pelvic uncomfort   Pressure feeling

## 2021-04-09 NOTE — LETTER
"2021       RE: Evelia Janesn  [address]  [place] [facility] [address]     Dear Colleague,    Thank you for referring your patient, Evelia Jansen, to the [facility] [place] at [facility]. Please see a copy of my visit note below.    Evelia Jansen is a 30 year old  female with PMH of subclinical hypothyroidism, prediabetes and anemia s/p normal vaginal delivery (2020) who presents to the clinic with concerns of her thyroid status, pre gestational diabetes and pelvic \"pressure\" symptoms post uterine prolapse.    Subjective  Upon further investigation, Evelia revealed that she came into the clinic to get her thyroid level and glucose levels checked because she was concerned that she would not be able to get the COVID 19 vaccine otherwise.     Review Of Systems  Constitutional: Endorses fatigue, denies chills, fever, weight loss or gain  Respiratory: No shortness of breath, dyspnea on exertion, cough, or hemoptysis   Cardiovascular: Denies chest pain, syncope, dizziness,   Gastrointestinal: Denies abdominal pain, constipation, diarrrhea  Genitourinary: Denies urinary incontinence, dysuria, burning, itching  Musculoskeletal: Endorses back pain, denies other muscle aches/pains  Psychiatric: Denies depression and anxiety but endorses increased stress with both parents working from home  Endocrine: Denies hot/cold intolerance, sweating, polyuria.    Objective  /83   Pulse 66   Wt 49 kg (108 lb)   LMP 2021   BMI 18.54 kg/m      Assessment  Evelia Jansen is a 30 year old  female who presented to the clinic asymptomatic with concerns regarding her thyroid status and gestational diabetes found to have a mild cystocele. No evidence of uterine prolapse.     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 She would still like her thyroid status evaluated which is reasonable.  - TSH, Free T4 to evaluate thyroid status  Back Pain  Most likely secondary to \"always carrying the baby around\"     Answers for HPI/ROS submitted by the patient on 4/3/2021   ALEX 7 TOTAL SCORE: 4    Lily Robert CNM    "

## 2021-04-24 ENCOUNTER — HEALTH MAINTENANCE LETTER (OUTPATIENT)
Age: 31
End: 2021-04-24

## 2021-09-17 ENCOUNTER — IMMUNIZATION (OUTPATIENT)
Dept: PEDIATRICS | Facility: CLINIC | Age: 31
End: 2021-09-17

## 2021-09-17 PROCEDURE — 90471 IMMUNIZATION ADMIN: CPT

## 2021-09-17 PROCEDURE — 90686 IIV4 VACC NO PRSV 0.5 ML IM: CPT

## 2021-09-24 ENCOUNTER — LAB (OUTPATIENT)
Dept: LAB | Facility: CLINIC | Age: 31
End: 2021-09-24
Payer: COMMERCIAL

## 2021-09-24 DIAGNOSIS — Z87.898 HISTORY OF PREDIABETES: ICD-10-CM

## 2021-09-24 DIAGNOSIS — E03.8 SUBCLINICAL HYPOTHYROIDISM: ICD-10-CM

## 2021-09-24 LAB
HBA1C MFR BLD: 5.2 % (ref 0–5.6)
NON GESTATIONAL GTT 2 HR POST DOSE: 123 MG/DL (ref 60–199)
NON GESTATIONAL GTT FASTING: 89 MG/DL (ref 60–125)
TSH SERPL DL<=0.005 MIU/L-ACNC: 1.83 MU/L (ref 0.4–4)

## 2021-09-24 PROCEDURE — 83036 HEMOGLOBIN GLYCOSYLATED A1C: CPT

## 2021-09-24 PROCEDURE — 36415 COLL VENOUS BLD VENIPUNCTURE: CPT

## 2021-09-24 PROCEDURE — 82950 GLUCOSE TEST: CPT

## 2021-09-24 PROCEDURE — 82947 ASSAY GLUCOSE BLOOD QUANT: CPT

## 2021-09-24 PROCEDURE — 84443 ASSAY THYROID STIM HORMONE: CPT

## 2021-12-14 ENCOUNTER — IMMUNIZATION (OUTPATIENT)
Dept: NURSING | Facility: CLINIC | Age: 31
End: 2021-12-14
Payer: COMMERCIAL

## 2021-12-14 PROCEDURE — 0004A PR COVID VAC PFIZER DIL RECON 30 MCG/0.3 ML IM: CPT

## 2021-12-14 PROCEDURE — 91300 PR COVID VAC PFIZER DIL RECON 30 MCG/0.3 ML IM: CPT

## 2022-02-01 ENCOUNTER — OFFICE VISIT (OUTPATIENT)
Dept: DERMATOLOGY | Facility: CLINIC | Age: 32
End: 2022-02-01
Payer: COMMERCIAL

## 2022-02-01 DIAGNOSIS — L70.0 ACNE VULGARIS: Primary | ICD-10-CM

## 2022-02-01 DIAGNOSIS — D49.2 NEOPLASM OF UNSPECIFIED BEHAVIOR OF BONE, SOFT TISSUE, AND SKIN: ICD-10-CM

## 2022-02-01 PROCEDURE — 67810 INCAL BX EYELID SKN LID MRGN: CPT | Mod: E1 | Performed by: DERMATOLOGY

## 2022-02-01 PROCEDURE — 99203 OFFICE O/P NEW LOW 30 MIN: CPT | Mod: 25 | Performed by: DERMATOLOGY

## 2022-02-01 PROCEDURE — 88305 TISSUE EXAM BY PATHOLOGIST: CPT | Mod: TC | Performed by: DERMATOLOGY

## 2022-02-01 RX ORDER — CLINDAMYCIN PHOSPHATE 10 UG/ML
LOTION TOPICAL 2 TIMES DAILY
Qty: 60 ML | Refills: 3 | Status: SHIPPED | OUTPATIENT
Start: 2022-02-01

## 2022-02-01 RX ORDER — TRETINOIN 0.5 MG/G
CREAM TOPICAL AT BEDTIME
Qty: 45 G | Refills: 3 | Status: SHIPPED | OUTPATIENT
Start: 2022-02-01

## 2022-02-01 ASSESSMENT — PAIN SCALES - GENERAL: PAINLEVEL: NO PAIN (0)

## 2022-02-01 NOTE — NURSING NOTE
"Dermatology Rooming Note    Evelia Jansen's goals for this visit include:   Chief Complaint   Patient presents with     Derm Problem     Altamirano is here today for a couple of moles on her face. She states \" The moles are growing bigger\"     Felicity Panchal, RMA  "

## 2022-02-01 NOTE — Clinical Note
2/1/2022       RE: Evelia Jansen  [address]  [place] [facility] [address]     Dear Colleague,    Thank you for referring your patient, Evelia Jansen, to the [facility]. Please see a copy of my visit note below.    [facility] Dermatology Note  Encounter Date: Feb 1, 2022  Office Visit     Dermatology Problem List:  1. ***    ____________________________________________    Assessment & Plan:    # {Diagnosesderm:831945}.   {kkplans:248356}   - ***     # {Diagnosesderm:474405}.   {kkplans:257124}   - ***     Procedures Performed:   {kkprocedurenotes:278711}  {kkprocedurenotes:373546}    Follow-up: {kkfollowup:735637}    Staff and Scribe:     Scribe Disclosure:  I, Jose Silver, am serving as a scribe to document services personally performed by Rambo Singh MD based on data collection and the provider's statements to me.     ***  ____________________________________________    CC: No chief complaint on file.    HPI:  Ms. Evelia Jansen is a(n) 31 year old female who presents today as a new patient for ***    Patient is otherwise feeling well, without additional skin concerns.    Labs Reviewed:  ***N/A    Physical Exam:  Vitals: There were no vitals taken for this visit.  SKIN: {kkSkinExam:618791}  - ***  - {Skin Exam Derm:141955}.   - {Skin Exam Derm:353718}.   - {Skin Exam Derm:643713}.   - No other lesions of concern on areas examined.     Medications:  Current Outpatient Medications   Medication     Prenatal Vit-Fe Fumarate-FA (PRENATAL VITAMIN) 27-0.8 MG TABS     No current facility-administered medications for this visit.      Past Medical History:   Patient Active Problem List   Diagnosis     Subclinical hypothyroidism     Vaginal delivery     Past Medical History:   Diagnosis Date     Diabetes (H) gestational diabetes     History of gestational diabetes      [place]13[place]    [facility]  [place]        CC Referred Self, MD  No address on file on close of this encounter.      Again, thank you for allowing me to participate in the care of your patient.      Sincerely,    Rambo Singh MD

## 2022-02-01 NOTE — NURSING NOTE
Lidocaine-epinephrine 1-1:289030 % injection   0.2mL once for one use, starting 2/1/2022 ending 2/1/2022,  2mL disp, R-0, injection  Injected by Johana Negro, CMA

## 2022-02-01 NOTE — PROGRESS NOTES
[facility] Dermatology Note  Encounter Date: Feb 1, 2022  Office Visit     Dermatology Problem List:  1. Nevi on left upper eyelid, irritated and growing s/p biopsy 02/01/2022  2. 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 It has become elevated and has extended in the horizontal axis. It is also now visible on her lateral field of vision.    - s/p shave biopsy 02/01/2022    # Acne vulgaris, inflammatory   Bilateral border of the jaw and neck.   Discussed risk and benefits of treatment options.   -Started on clindamycin BID and topical tretinoin at bedtime.    Procedures Performed:   - Shave biopsy procedure note, location(s): left upper eyelid. After discussion of benefits and risks including but not limited to bleeding, infection, scar, incomplete removal, recurrence, and non-diagnostic biopsy, written consent and photographs were obtained. The area was cleaned with isopropyl alcohol. 0.5mL of 1% lidocaine with epinephrine was injected to obtain adequate anesthesia of lesion(s). Shave biopsy at site(s) performed. Hemostasis was achieved with aluminium chloride. Petrolatum ointment and a sterile dressing were applied. The patient tolerated the procedure and no complications were noted. The patient was provided with verbal and written post care instructions.     Follow-up: 3 month(s) in-person, or earlier for new or changing lesions    Staff, resident and Scribe:   Rambo Singh (Staff)  Kelsi Hernandez (IM resident)     Scribe Disclosure:  I, Jose Silver, am serving as a scribe to document services personally performed by Rambo Singh MD based on data collection and the provider's statements to me.     Staff Physician Comments:   I saw and evaluated the patient with the resident and I agree with the  "assessment and plan.  I was present for the key portions of the above major procedure and examination. I have made edits if needed.    Rambo Singh MD  Staff Dermatologist and Dermatopathologist  , Department of Dermatology    Staff attestation:  The documentation recorded by the scribe accurately reflects the services I personally performed and the decisions I personally made. I have made edits where needed.    Rambo Singh MD  Staff Dermatologist and Dermatopathologist  , Department of Dermatology  ____________________________________________    CC: mole with changes and acne    HPI:  Ms. Evelia Jansen is a(n) 31 year old female who presents today as a new patient for changes in mole and acne.     Patient reports changes in the horizontal and vertical axis of the upper eyelid mole. She has noticed some irritation (tender, inflamed) and new blockage of her lateral vision for the last couple of months.  Denies tenderness to palpation.     She also endorses ongoing papules and irritated \"acne\" on her chin and neck. Patient has not tried any medication for this lesions yet.     Patient is otherwise feeling well, without additional skin concerns.    Labs Reviewed:  N/A    Physical Exam:  Vitals: There were no vitals taken for this visit.  SKIN: Focused examination of face/neck was performed.  - There are superifical acneiform papules with intermixed open and closed comedones on the border or the jaw, chin and neck.   - Multiple regular brown pigmented macules and papules are identified on the left upper eyelid, upper lip.   - No other lesions of concern on areas examined.     Medications:  Current Outpatient Medications   Medication     Prenatal Vit-Fe Fumarate-FA (PRENATAL VITAMIN) 27-0.8 MG TABS     No current facility-administered medications for this visit.      Past Medical History:   Patient Active Problem List   Diagnosis     Subclinical hypothyroidism     Vaginal " delivery     Past Medical History:   Diagnosis Date     Diabetes (H) gestational diabetes     History of gestational diabetes      Hypothyroidism 06/13/2017    25mcg levothyroxine now        CC Referred Self, MD  No address on file on close of this encounter.

## 2022-02-01 NOTE — PATIENT INSTRUCTIONS
For the first 2 weeks, use tretinoin cream every other night  Wound Care After a Biopsy    What is a skin biopsy?  A skin biopsy allows the doctor to examine a very small piece of tissue under the microscope to determine the diagnosis and the best treatment for the skin condition. A local anesthetic (numbing medicine)  is injected with a very small needle into the skin area to be tested. A small piece of skin is taken from the area. Sometimes a suture (stitch) is used.     What are the risks of a skin biopsy?  I will experience scar, bleeding, swelling, pain, crusting and redness. I may experience incomplete removal or recurrence. Risks of this procedure are excessive bleeding, bruising, infection, nerve damage, numbness, thick (hypertrophic or keloidal) scar and non-diagnostic biopsy.    How should I care for my wound for the first 24 hours?    Keep the wound dry and covered for 24 hours    If it bleeds, hold direct pressure on the area for 15 minutes. If bleeding does not stop then go to the emergency room    Avoid strenuous exercise the first 1-2 days or as your doctor instructs you    How should I care for the wound after 24 hours?    After 24 hours, remove the bandage    You may bathe or shower as normal    If you had a scalp biopsy, you can shampoo as usual and can use shower water to clean the biopsy site daily    Clean the wound twice a day with gentle soap and water    Do not scrub, be gentle    Apply white petroleum/Vaseline after cleaning the wound with a cotton swab or a clean finger, and keep the site covered with a Bandaid /bandage. Bandages are not necessary with a scalp biopsy    If you are unable to cover the site with a Bandaid /bandage, re-apply ointment 2-3 times a day to keep the site moist. Moisture will help with healing    Avoid strenuous activity for first 1-2 days    Avoid lakes, rivers, pools, and oceans until the stitches are removed or the site is healed    How do I clean my  wound?    Wash hands thoroughly with soap or use hand  before all wound care    Clean the wound with gentle soap and water    Apply white petroleum/Vaseline  to wound after it is clean    Replace the Bandaid /bandage to keep the wound covered for the first few days or as instructed by your doctor    If you had a scalp biopsy, warm shower water to the area on a daily basis should suffice    What should I use to clean my wound?     Cotton-tipped applicators (Qtips )    White petroleum jelly (Vaseline ). Use a clean new container and use Q-tips to apply.    Bandaids   as needed    Gentle soap     How should I care for my wound long term?    Do not get your wound dirty    Keep up with wound care for one week or until the area is healed.    A small scab will form and fall off by itself when the area is completely healed. The area will be red and will become pink in color as it heals. Sun protection is very important for how your scar will turn out. Sunscreen with an SPF 30 or greater is recommended once the area is healed.    If you have stitches, stitches need to be removed in 14 days. You may return to our clinic for this or you may have it done locally at your doctor s office.    You should have some soreness but it should be mild and slowly go away over several days. Talk to your doctor about using tylenol for pain,    When should I call my doctor?  If you have increased:     Pain or swelling    Pus or drainage (clear or slightly yellow drainage is ok)    Temperature over 100F    Spreading redness or warmth around wound    When will I hear about my results?  The biopsy results can take 2 weeks to come back.  Your results will automatically release to [facility] before your provider has even reviewed them.  The clinic will call you with the results, send you a [employer] message, or have you schedule a follow-up clinic or phone time to discuss the results.  Contact our clinics if you do not hear from us in 2  weeks.    Who should I call with questions?    [facility] [place]: [phone]    [facility]: [phone]    For urgent needs outside of business hours call the [facility] at [phone] and ask for the dermatology resident on call

## 2022-02-03 LAB
PATH REPORT.COMMENTS IMP SPEC: NORMAL
PATH REPORT.COMMENTS IMP SPEC: NORMAL
PATH REPORT.FINAL DX SPEC: NORMAL
PATH REPORT.GROSS SPEC: NORMAL
PATH REPORT.MICROSCOPIC SPEC OTHER STN: NORMAL
PATH REPORT.RELEVANT HX SPEC: NORMAL

## 2022-02-17 PROBLEM — Z34.03 NORMAL FIRST PREGNANCY IN THIRD TRIMESTER: Status: RESOLVED | Noted: 2017-06-29 | Resolved: 2019-02-20

## 2022-05-21 ENCOUNTER — HEALTH MAINTENANCE LETTER (OUTPATIENT)
Age: 32
End: 2022-05-21

## 2022-09-17 ENCOUNTER — HEALTH MAINTENANCE LETTER (OUTPATIENT)
Age: 32
End: 2022-09-17

## 2023-01-18 NOTE — PLAN OF CARE
"Problem: Patient Care Overview  Goal: Plan of Care/Patient Progress Review  Outcome: Improving  PP assessment WNL. Vital signs stable. Pt up ad jg, steady gait. Pt. Was straight cath'd for 1000ml at 0400 has yet to void post cath. Encouraged fluid intake. Pain managed with Ibuprofen -see MAR. PT \"fasting BG\" was 115 but patient had 2 toast, milk and yogurt btwn 12-3am sticky note to provider. No further concerns, will continue with pt plan of care.         " Diagnosis: Metabolic encephalopathy [348.31.ICD-9-CM]   Future Attending Provider: MURTAZA BREWER III [[id number]]   Admitting Provider:: MURTAZA BREWER III [[id number]]   Special Needs:: Fall Risk [15]

## 2023-06-03 ENCOUNTER — HEALTH MAINTENANCE LETTER (OUTPATIENT)
Age: 33
End: 2023-06-03

## 2024-07-13 ENCOUNTER — HEALTH MAINTENANCE LETTER (OUTPATIENT)
Age: 34
End: 2024-07-13

## 2024-10-14 NOTE — TELEPHONE ENCOUNTER
Per result note, patient had abnormal 2 hour glucose. Referring to endocrine. Patient will call to schedule. Referral input.    This document is complete and the patient is ready for discharge.

## 2024-12-13 NOTE — MR AVS SNAPSHOT
After Visit Summary   8/22/2017    Evelia Jansen    MRN: [id number]           Patient Information     Date Of Birth          1990        Visit Information        Provider Department      8/22/2017 8:45 AM Davina Zhao APRN CNM [facility]        Today's Diagnoses     Normal first pregnancy in first trimester    -  1       Follow-ups after your visit        Follow-up notes from your care team     Return in about 6 weeks (around 10/3/2017) for Return OB.      Your next 10 appointments already scheduled     Aug 30, 2017  9:30 AM CDT   NATALI US COMP with [id number]   [facility])    [address]  [facility] [address] [phone]           Wear comfortable clothes and leave your valuables at home.            Aug 30, 2017 10:00 AM CDT   Radiology MD with JAAMRI HURST MD   [facility])    [address]  [facility] [id number] [phone]           Please arrive at the time given for your first appointment. This visit is used internally to schedule the physician's time during your ultrasound.              Who to contact     Please call your clinic at [phone] to:    Ask questions about your health    Make or cancel appointments    Discuss your medicines    Learn about your test results    Speak to your doctor   If you have compliments or concerns about an experience at your clinic, or if you wish to file a complaint, please contact [facility] Patient Relations at [phone] or email us at [email or web address]         Additional Information About Your Visit        MyChart Information     [facility] gives you secure access to your electronic health record. If you see a primary care provider, you can also send messages to your care team and make appointments. If  Refill Routing Note   Medication(s) are not appropriate for processing by [facility] for the following reason(s):        Required labs outdated  Required vitals outdated  No active prescription written by provider    [facility] action(s):  Defer               Appointments  past 12m or future 3m with PCP    Date Provider   Last Visit   11/30/2023 Nicolasa Peterson MD   Next Visit   Visit date not found Nicolasa Peterson MD   ED visits in past 90 days: 0        Note composed:12:47 PM 12/13/2024            "you have questions, please call your primary care clinic.  If you do not have a primary care provider, please call [phone] and they will assist you.      [employer] is an electronic gateway that provides easy, online access to your medical records. With [employer], you can request a clinic appointment, read your test results, renew a prescription or communicate with your care team.     To access your existing account, please contact your [facility] or call [phone] for assistance.        Care EveryWhere ID     This is your Care EveryWhere ID. This could be used by other organizations to access your [facility] medical records  [id number]        Your Vitals Were     Pulse Height Last Period BMI (Body Mass Index)          85 1.615 m (5' 3.58\") 04/24/2017 (Approximate) 21.04 kg/m2         Blood Pressure from Last 3 Encounters:   08/22/17 108/62   07/12/17 102/64   06/29/17 100/70    Weight from Last 3 Encounters:   08/22/17 54.9 kg (121 lb)   07/12/17 51.9 kg (114 lb 6.4 oz)   06/29/17 51.8 kg (114 lb 1.6 oz)              We Performed the Following     AFP - Alpha Fetoprotein Maternal Screen        Primary Care Provider    None Specified       No primary provider on file.        Equal Access to Services     MICHELLE MARTÍNEZ : Hadii sarah keitao Soan, waaxda luqadaha, qaybta kaalmada adeegyada, alistair winter. So [facility] [phone].    ATENCIÓN: Si habla español, tiene a tillman disposición servicios gratuitos de asistencia lingüística. Llame al [phone].    We comply with applicable federal civil rights laws and [place] laws. We do not discriminate on the basis of race, color, national origin, age, disability sex, sexual orientation or gender identity.            Thank you!     Thank you for choosing [facility]  for your care. Our goal is always to provide you with excellent care. Hearing back from our patients is one way we can " continue to improve our services. Please take a few minutes to complete the written survey that you may receive in the mail after your visit with us. Thank you!             Your Updated Medication List - Protect others around you: Learn how to safely use, store and throw away your medicines at www.disposemymeds.org.          This list is accurate as of: 8/22/17  9:12 AM.  Always use your most recent med list.                   Brand Name Dispense Instructions for use Diagnosis    levothyroxine 50 MCG tablet    SYNTHROID/LEVOTHROID     Take 50 mcg by mouth daily        prenatal multivitamin plus iron 27-0.8 MG Tabs per tablet      Take 1 tablet by mouth daily